# Patient Record
Sex: FEMALE | Race: WHITE | Employment: UNEMPLOYED | URBAN - METROPOLITAN AREA
[De-identification: names, ages, dates, MRNs, and addresses within clinical notes are randomized per-mention and may not be internally consistent; named-entity substitution may affect disease eponyms.]

---

## 2017-06-16 ENCOUNTER — LAB REQUISITION (OUTPATIENT)
Dept: LAB | Facility: HOSPITAL | Age: 22
End: 2017-06-16
Payer: COMMERCIAL

## 2017-06-16 DIAGNOSIS — N92.6 IRREGULAR MENSTRUATION: ICD-10-CM

## 2017-06-16 PROCEDURE — 84702 CHORIONIC GONADOTROPIN TEST: CPT | Performed by: OBSTETRICS & GYNECOLOGY

## 2017-07-22 ENCOUNTER — HOSPITAL ENCOUNTER (OUTPATIENT)
Age: 22
Discharge: HOME | End: 2017-07-22
Attending: FAMILY MEDICINE
Payer: COMMERCIAL

## 2017-07-22 VITALS
RESPIRATION RATE: 20 BRPM | HEART RATE: 95 BPM | SYSTOLIC BLOOD PRESSURE: 113 MMHG | TEMPERATURE: 98 F | OXYGEN SATURATION: 99 % | DIASTOLIC BLOOD PRESSURE: 67 MMHG

## 2017-07-22 DIAGNOSIS — H10.12 ALLERGIC CONJUNCTIVITIS, LEFT: ICD-10-CM

## 2017-07-22 DIAGNOSIS — N39.0 UTI (URINARY TRACT INFECTION), UNCOMPLICATED: Primary | ICD-10-CM

## 2017-07-22 LAB
POCT BILIRUBIN URINE: NEGATIVE
POCT GLUCOSE URINE: NEGATIVE MG/DL
POCT KETONE URINE: 15 MG/DL
POCT NITRITE URINE: NEGATIVE
POCT PH URINE: 6.5 (ref 5–8)
POCT PROTEIN URINE: 30 MG/DL
POCT SPECIFIC GRAVITY URINE: 1.02
POCT URINE COLOR: YELLOW
POCT URINE PREGNANCY: NEGATIVE
POCT UROBILINOGEN URINE: 0.2 MG/DL

## 2017-07-22 PROCEDURE — 87086 URINE CULTURE/COLONY COUNT: CPT | Performed by: FAMILY MEDICINE

## 2017-07-22 PROCEDURE — 81002 URINALYSIS NONAUTO W/O SCOPE: CPT | Performed by: FAMILY MEDICINE

## 2017-07-22 PROCEDURE — 99214 OFFICE O/P EST MOD 30 MIN: CPT

## 2017-07-22 PROCEDURE — 81025 URINE PREGNANCY TEST: CPT | Performed by: FAMILY MEDICINE

## 2017-07-22 RX ORDER — NITROFURANTOIN 25; 75 MG/1; MG/1
100 CAPSULE ORAL 2 TIMES DAILY
Qty: 10 CAPSULE | Refills: 0 | Status: SHIPPED | OUTPATIENT
Start: 2017-07-22 | End: 2017-07-27

## 2017-07-22 RX ORDER — OLOPATADINE HYDROCHLORIDE 2 MG/ML
SOLUTION/ DROPS OPHTHALMIC
Qty: 1 BOTTLE | Refills: 0 | Status: SHIPPED | OUTPATIENT
Start: 2017-07-22 | End: 2017-08-29

## 2017-07-22 NOTE — ED PROVIDER NOTES
Patient Seen in: [facility]    History   Patient presents with:  Conjunctivitis  Urinary Symptoms (urologic)    Stated Complaint: urinary issue 1 wk    HPI    This 53-year-old female presents the office with urinary frequency fo today and agreed except as otherwise stated in HPI.     Physical Exam   ED Triage Vitals [07/22/17 1015]  BP: 113/67  Pulse: 95  Resp: 20  Temp: 98.4 °F (36.9 °C)  Temp src: Temporal  SpO2: 99 %  O2 Device: None (Room air)    Current:/67   Pulse 95 allergic conjunctivitis. I do not see evidence for infectious pinkeye. Prescription for Pataday eyedrops are given. She is to restart her Claritin 10 mg once daily for allergy symptoms.   She is to follow-up with her primary doctor in 2-3 days if not imp

## 2017-07-31 ENCOUNTER — HOSPITAL ENCOUNTER (OUTPATIENT)
Age: 22
Discharge: HOME | End: 2017-07-31
Attending: FAMILY MEDICINE
Payer: COMMERCIAL

## 2017-07-31 VITALS
SYSTOLIC BLOOD PRESSURE: 130 MMHG | RESPIRATION RATE: 18 BRPM | HEIGHT: 61 IN | TEMPERATURE: 99 F | DIASTOLIC BLOOD PRESSURE: 68 MMHG | WEIGHT: 125 LBS | BODY MASS INDEX: 23.6 KG/M2 | OXYGEN SATURATION: 99 % | HEART RATE: 100 BPM

## 2017-07-31 DIAGNOSIS — N30.01 ACUTE CYSTITIS WITH HEMATURIA: Primary | ICD-10-CM

## 2017-07-31 LAB
POCT BILIRUBIN URINE: NEGATIVE
POCT GLUCOSE URINE: 250 MG/DL
POCT KETONE URINE: NEGATIVE MG/DL
POCT LEUKOCYTE ESTERASE URINE: NEGATIVE
POCT NITRITE URINE: NEGATIVE
POCT PH URINE: 7 (ref 5–8)
POCT SPECIFIC GRAVITY URINE: 1.02
POCT URINE PREGNANCY: NEGATIVE
POCT UROBILINOGEN URINE: 0.2 MG/DL

## 2017-07-31 PROCEDURE — 99214 OFFICE O/P EST MOD 30 MIN: CPT

## 2017-07-31 PROCEDURE — 99213 OFFICE O/P EST LOW 20 MIN: CPT

## 2017-07-31 PROCEDURE — 81025 URINE PREGNANCY TEST: CPT | Performed by: FAMILY MEDICINE

## 2017-07-31 PROCEDURE — 87086 URINE CULTURE/COLONY COUNT: CPT | Performed by: FAMILY MEDICINE

## 2017-07-31 PROCEDURE — 81002 URINALYSIS NONAUTO W/O SCOPE: CPT | Performed by: FAMILY MEDICINE

## 2017-07-31 RX ORDER — NITROFURANTOIN 25; 75 MG/1; MG/1
100 CAPSULE ORAL 2 TIMES DAILY
Qty: 14 CAPSULE | Refills: 0 | Status: SHIPPED | OUTPATIENT
Start: 2017-07-31 | End: 2017-08-07

## 2017-08-01 NOTE — ED INITIAL ASSESSMENT (HPI)
Pain with urination- x 10 days  Pain at the end of urine, with frequency, denies fever or blood in urine. Pt was diagnosed with UTI  10 days ago  Ws prescribed with antibiotic x 5 days. Completed Wednesday.

## 2017-08-01 NOTE — ED PROVIDER NOTES
Patient Seen in: [facility]    History   Patient presents with:  Urinary Symptoms (urologic)    Stated Complaint: frequency,burning urinating x10 days    HPI  22-year-old female coming in with complaints of painful urination ass air)    Current:/68   Pulse 100   Temp 98.6 °F (37 °C) (Temporal)   Resp 18   Ht 154.9 cm (5' 1\")   Wt 56.7 kg   LMP 07/14/2017   SpO2 99%   BMI 23.62 kg/m²         Physical Exam    GEN: Not in any acute distress, making good conversation, answering in antibiotics. Otherwise, take all your medications until completed. See your doctor in 2-3 days if not better.   If symptoms of pyelonephritis occur - nausea, vomiting, flank pain, fever and chills please return immediately / go to the ER    Avoid holdi

## 2017-08-29 ENCOUNTER — OFFICE VISIT (OUTPATIENT)
Dept: OBGYN CLINIC | Facility: CLINIC | Age: 22
End: 2017-08-29

## 2017-08-29 VITALS
HEART RATE: 81 BPM | DIASTOLIC BLOOD PRESSURE: 72 MMHG | WEIGHT: 118 LBS | SYSTOLIC BLOOD PRESSURE: 116 MMHG | BODY MASS INDEX: 22 KG/M2

## 2017-08-29 DIAGNOSIS — Z30.011 BCP (BIRTH CONTROL PILLS) INITIATION: ICD-10-CM

## 2017-08-29 DIAGNOSIS — R39.89 SENSATION OF PRESSURE IN BLADDER AREA: ICD-10-CM

## 2017-08-29 DIAGNOSIS — N89.8 VAGINAL DISCHARGE: Primary | ICD-10-CM

## 2017-08-29 DIAGNOSIS — Z11.3 ROUTINE SCREENING FOR STI (SEXUALLY TRANSMITTED INFECTION): ICD-10-CM

## 2017-08-29 LAB
MULTISTIX LOT#: NORMAL NUMERIC
PH, URINE: 7 (ref 4.5–8)
PROTEIN (URINE DIPSTICK): 3 MG/DL
SPECIFIC GRAVITY: 1.01 (ref 1–1.03)
UROBILINOGEN,SEMI-QN: 0.2 MG/DL (ref 0–1.9)

## 2017-08-29 PROCEDURE — 99385 PREV VISIT NEW AGE 18-39: CPT | Performed by: ADVANCED PRACTICE MIDWIFE

## 2017-08-29 PROCEDURE — 81002 URINALYSIS NONAUTO W/O SCOPE: CPT | Performed by: ADVANCED PRACTICE MIDWIFE

## 2017-08-29 NOTE — PROGRESS NOTES
HPI:    Patient ID: Missy Rodriguez is a 25year old female. Was treated twice for UTI. Culture showed 10,000 mixed bacteria. Has pressure after completing urination. No burning. Denies vaginal discharge. Is sexually active.   Uses condoms for prot She exhibits no distension and no mass. There is no tenderness. There is no rebound and no guarding. Genitourinary: Rectum normal, vagina normal and uterus normal. There is no rash, tenderness or lesion on the right labia.  There is no rash, tenderness or once a week.            Imaging & Referrals:  None       VV#[id number]

## 2017-08-30 DIAGNOSIS — N76.0 BV (BACTERIAL VAGINOSIS): Primary | ICD-10-CM

## 2017-08-30 DIAGNOSIS — B96.89 BV (BACTERIAL VAGINOSIS): Primary | ICD-10-CM

## 2017-08-30 RX ORDER — METRONIDAZOLE 500 MG/1
500 TABLET ORAL 2 TIMES DAILY
Qty: 14 TABLET | Refills: 0 | Status: SHIPPED | OUTPATIENT
Start: 2017-08-30 | End: 2019-11-05

## 2017-08-31 ENCOUNTER — TELEPHONE (OUTPATIENT)
Dept: OBGYN CLINIC | Facility: CLINIC | Age: 22
End: 2017-08-31

## 2017-08-31 DIAGNOSIS — Z11.3 SCREENING EXAMINATION FOR STD (SEXUALLY TRANSMITTED DISEASE): Primary | ICD-10-CM

## 2017-08-31 LAB
C TRACH DNA SPEC QL NAA+PROBE: POSITIVE
GENITAL VAGINOSIS SCREEN: POSITIVE
N GONORRHOEA DNA SPEC QL NAA+PROBE: NEGATIVE
TRICHOMONAS SCREEN: NEGATIVE

## 2017-08-31 RX ORDER — AZITHROMYCIN 500 MG/1
TABLET, FILM COATED ORAL
Qty: 2 TABLET | Refills: 0 | Status: SHIPPED | OUTPATIENT
Start: 2017-08-31 | End: 2019-11-05

## 2017-08-31 NOTE — TELEPHONE ENCOUNTER
Please inform patient of positive chlamydia test.  Recommend Azithromycin, 1000mg PO once. Also recommend partner be treated. Prescription sent to pharmacy. To have test of cure culture done in approx 4 weeks.

## 2017-08-31 NOTE — TELEPHONE ENCOUNTER
08/31/2017 Dayna Rivera from the lab called for a positive chlamydia result for pt. Pls advise. Routed to Kristi MAY as well as caridad.

## 2017-09-08 ENCOUNTER — TELEPHONE (OUTPATIENT)
Dept: OBGYN CLINIC | Facility: CLINIC | Age: 22
End: 2017-09-08

## 2017-09-20 ENCOUNTER — TELEPHONE (OUTPATIENT)
Dept: PEDIATRICS CLINIC | Facility: CLINIC | Age: 22
End: 2017-09-20

## 2017-09-20 NOTE — TELEPHONE ENCOUNTER
Pt states she was prescribed medication and it is still not helping. Pt is not in town pls adv     Current Outpatient Prescriptions:  azithromycin 500 MG Oral Tab Take two tablets by mouth at once.  Disp: 2 tablet Rfl: 0   metRONIDAZOLE (FLAGYL) 500 MG Oral

## 2017-09-20 NOTE — TELEPHONE ENCOUNTER
Patient seen 8/29 with [facility] positive for chlamydia was tx at that time with azitrthromycin but still having burning symptoms. Just moved to [facility] can not return to be seen anytime soon.  Per SJM patient needs to be retested again to make sure clear of in

## 2017-10-11 ENCOUNTER — TELEPHONE (OUTPATIENT)
Dept: OBGYN CLINIC | Facility: CLINIC | Age: 22
End: 2017-10-11

## 2017-10-11 NOTE — TELEPHONE ENCOUNTER
Corbin Trotter from [address] [place] [address] calling to see what treatment was given to patient for chlamydia.

## 2017-10-11 NOTE — TELEPHONE ENCOUNTER
Felipe Bullock from [facility] department informed pt was treated with 1g of azithromycin. Verbalized understanding. No further question.

## 2018-04-02 RX ORDER — AZITHROMYCIN 500 MG/1
TABLET, FILM COATED ORAL
Qty: 2 TABLET | Refills: 0 | OUTPATIENT
Start: 2018-04-02

## 2019-11-05 ENCOUNTER — HOSPITAL ENCOUNTER (OUTPATIENT)
Age: 24
Discharge: HOME | End: 2019-11-05
Payer: COMMERCIAL

## 2019-11-05 VITALS
SYSTOLIC BLOOD PRESSURE: 124 MMHG | RESPIRATION RATE: 18 BRPM | DIASTOLIC BLOOD PRESSURE: 69 MMHG | HEART RATE: 95 BPM | BODY MASS INDEX: 23.79 KG/M2 | TEMPERATURE: 99 F | HEIGHT: 61 IN | WEIGHT: 126 LBS | OXYGEN SATURATION: 99 %

## 2019-11-05 DIAGNOSIS — J01.00 ACUTE NON-RECURRENT MAXILLARY SINUSITIS: Primary | ICD-10-CM

## 2019-11-05 PROCEDURE — 81025 URINE PREGNANCY TEST: CPT | Performed by: NURSE PRACTITIONER

## 2019-11-05 PROCEDURE — 99213 OFFICE O/P EST LOW 20 MIN: CPT

## 2019-11-05 PROCEDURE — 99214 OFFICE O/P EST MOD 30 MIN: CPT

## 2019-11-05 RX ORDER — DOXYCYCLINE HYCLATE 100 MG/1
100 CAPSULE ORAL 2 TIMES DAILY
Qty: 20 CAPSULE | Refills: 0 | Status: SHIPPED | OUTPATIENT
Start: 2019-11-05 | End: 2019-11-15

## 2019-11-06 NOTE — ED PROVIDER NOTES
Patient Seen in: [facility]      History   Patient presents with:  Cough/URI    Stated Complaint: throat pain itching sinus press sinus congestion x 2 weeks    HPI  27year old female presents with nasal congestion, sore throat, present. Right Sinus: Maxillary sinus tenderness present. Left Sinus: Maxillary sinus tenderness present. Eyes:      General:         Right eye: No discharge. Left eye: No discharge.       Conjunctiva/sclera: Conjunctivae normal.      Pu capsule (100 mg total) by mouth 2 (two) times daily for 10 days. , Normal, Disp-20 capsule, R-0

## 2019-11-06 NOTE — ED INITIAL ASSESSMENT (HPI)
Pt here w/ sinus congestion and sore throat for 2 weeks. +cough, dry, hacking cough.  Post nasal drip

## 2019-11-08 ENCOUNTER — HOSPITAL ENCOUNTER (OUTPATIENT)
Age: 24
Discharge: HOME | End: 2019-11-08
Attending: EMERGENCY MEDICINE
Payer: COMMERCIAL

## 2019-11-08 VITALS
SYSTOLIC BLOOD PRESSURE: 105 MMHG | RESPIRATION RATE: 16 BRPM | HEART RATE: 89 BPM | OXYGEN SATURATION: 100 % | DIASTOLIC BLOOD PRESSURE: 65 MMHG | TEMPERATURE: 98 F

## 2019-11-08 DIAGNOSIS — R30.0 DYSURIA: Primary | ICD-10-CM

## 2019-11-08 PROCEDURE — 87086 URINE CULTURE/COLONY COUNT: CPT | Performed by: EMERGENCY MEDICINE

## 2019-11-08 PROCEDURE — 81025 URINE PREGNANCY TEST: CPT

## 2019-11-08 PROCEDURE — 99214 OFFICE O/P EST MOD 30 MIN: CPT

## 2019-11-08 PROCEDURE — 81002 URINALYSIS NONAUTO W/O SCOPE: CPT | Performed by: EMERGENCY MEDICINE

## 2019-11-08 PROCEDURE — 81025 URINE PREGNANCY TEST: CPT | Performed by: EMERGENCY MEDICINE

## 2019-11-08 PROCEDURE — 81002 URINALYSIS NONAUTO W/O SCOPE: CPT

## 2019-11-08 RX ORDER — PHENAZOPYRIDINE HYDROCHLORIDE 100 MG/1
100 TABLET, FILM COATED ORAL 3 TIMES DAILY PRN
Qty: 6 TABLET | Refills: 0 | Status: SHIPPED | OUTPATIENT
Start: 2019-11-08 | End: 2019-11-11

## 2019-11-08 RX ORDER — SULFAMETHOXAZOLE AND TRIMETHOPRIM 800; 160 MG/1; MG/1
1 TABLET ORAL 2 TIMES DAILY
Qty: 6 TABLET | Refills: 0 | Status: SHIPPED | OUTPATIENT
Start: 2019-11-08 | End: 2019-11-11

## 2019-11-09 NOTE — ED PROVIDER NOTES
Patient Seen in: [facility]      History   Patient presents with:  Urinary Symptoms (urologic)    Stated Complaint: DYSURIA/URINARY FREQUENCY     HPI    Patient is a 26-year-old female presents to urgent care for evaluation of u Blood, Urine Moderate (*)     All other components within normal limits   POCT PREGNANCY, URINE - Normal   URINE CULTURE, ROUTINE                  MDM     Patient is a 32-year-old female comes to ED for evaluation of dysuria.   Patient urinalysis appears ne

## 2019-11-10 NOTE — ED NOTES
Patient called and states pyridium not helping with dysuria. Told final urine no growth and to f/u with her pcp,

## 2020-11-01 ENCOUNTER — WALK IN (OUTPATIENT)
Dept: URGENT CARE | Age: 25
End: 2020-11-01
Attending: EMERGENCY MEDICINE

## 2020-11-01 DIAGNOSIS — Z20.822 SUSPECTED COVID-19 VIRUS INFECTION: Primary | ICD-10-CM

## 2020-11-01 PROCEDURE — 99202 OFFICE O/P NEW SF 15 MIN: CPT

## 2020-11-01 PROCEDURE — C9803 HOPD COVID-19 SPEC COLLECT: HCPCS

## 2020-11-01 PROCEDURE — U0003 INFECTIOUS AGENT DETECTION BY NUCLEIC ACID (DNA OR RNA); SEVERE ACUTE RESPIRATORY SYNDROME CORONAVIRUS 2 (SARS-COV-2) (CORONAVIRUS DISEASE [COVID-19]), AMPLIFIED PROBE TECHNIQUE, MAKING USE OF HIGH THROUGHPUT TECHNOLOGIES AS DESCRIBED BY CMS-2020-01-R: HCPCS

## 2020-11-01 ASSESSMENT — ENCOUNTER SYMPTOMS
BRUISES/BLEEDS EASILY: 0
NAUSEA: 0
ABDOMINAL PAIN: 0
EYE DISCHARGE: 0
DIZZINESS: 0
POLYPHAGIA: 0
FEVER: 0
CONFUSION: 0
EYE PAIN: 0
CHILLS: 0
NUMBNESS: 0
POLYDIPSIA: 0
COLOR CHANGE: 0
HEADACHES: 0
SHORTNESS OF BREATH: 0
DIARRHEA: 0
ACTIVITY CHANGE: 0
BACK PAIN: 0
SORE THROAT: 1
WOUND: 0
COUGH: 1
WHEEZING: 0
VOMITING: 0
EYE REDNESS: 0
FACIAL SWELLING: 0

## 2020-11-01 ASSESSMENT — PAIN SCALES - GENERAL: PAINLEVEL: 1-2

## 2020-11-04 ENCOUNTER — TELEPHONE (OUTPATIENT)
Dept: SCHEDULING | Age: 25
End: 2020-11-04

## 2020-11-04 LAB
SARS-COV-2 RNA RESP QL NAA+PROBE: NOT DETECTED
SPECIMEN SOURCE: NORMAL

## 2020-11-06 ENCOUNTER — TELEPHONE (OUTPATIENT)
Dept: SCHEDULING | Age: 25
End: 2020-11-06

## 2020-11-07 ENCOUNTER — TELEPHONE (OUTPATIENT)
Dept: SCHEDULING | Age: 25
End: 2020-11-07

## 2021-05-17 ENCOUNTER — OFFICE VISIT (OUTPATIENT)
Dept: OBGYN CLINIC | Facility: CLINIC | Age: 26
End: 2021-05-17
Payer: MEDICAID

## 2021-05-17 VITALS
HEIGHT: 61 IN | SYSTOLIC BLOOD PRESSURE: 100 MMHG | WEIGHT: 135 LBS | BODY MASS INDEX: 25.49 KG/M2 | DIASTOLIC BLOOD PRESSURE: 60 MMHG

## 2021-05-17 DIAGNOSIS — N92.6 MISSED MENSES: Primary | ICD-10-CM

## 2021-05-17 PROCEDURE — 99203 OFFICE O/P NEW LOW 30 MIN: CPT | Performed by: ADVANCED PRACTICE MIDWIFE

## 2021-05-17 PROCEDURE — 3008F BODY MASS INDEX DOCD: CPT | Performed by: ADVANCED PRACTICE MIDWIFE

## 2021-05-17 PROCEDURE — 3074F SYST BP LT 130 MM HG: CPT | Performed by: ADVANCED PRACTICE MIDWIFE

## 2021-05-17 PROCEDURE — 81025 URINE PREGNANCY TEST: CPT | Performed by: ADVANCED PRACTICE MIDWIFE

## 2021-05-17 PROCEDURE — 3078F DIAST BP <80 MM HG: CPT | Performed by: ADVANCED PRACTICE MIDWIFE

## 2021-05-17 RX ORDER — PRENATAL VIT/IRON FUM/FOLIC AC 27MG-0.8MG
1 TABLET ORAL DAILY
COMMUNITY

## 2021-05-17 NOTE — PROGRESS NOTES
HPI/Subjective:   Patient ID: Dianne Lauren is a 23year old female. LMP 3/27/2021 ALONSO 1/1/2022 7 w 2 d. Unplanned pregnancy. Has supportive mother and FOB. Has had some nausea, no bleeding.   Has not treated nausea with anything but it suddenly st of Onset   • Other (Other[other]) Father         overdose   • Hypertension Mother    • Other (Other[other]) Brother         asthma      Social History:   Social History    Tobacco Use      Smoking status: Never Smoker      Smokeless tobacco: Never Used

## 2021-05-19 ENCOUNTER — TELEPHONE (OUTPATIENT)
Dept: OBGYN CLINIC | Facility: CLINIC | Age: 26
End: 2021-05-19

## 2021-05-19 NOTE — TELEPHONE ENCOUNTER
Offered pt meet and greet appt and pt accepted. Appt scheduled tomorrow with BRIE. Pt agreed and voiced understanding.

## 2021-05-19 NOTE — TELEPHONE ENCOUNTER
Primary Diagnosis Code: N92.6 Description: Irregular menstruation, unspecified  Secondary Diagnosis Code:  Description:   Date of Service: Not provided    CPT Code: 34753 Description: OB US < 14 WKS SINGLE FETUS  Case Number: [id number]  Review Date: 5/19/

## 2021-05-20 ENCOUNTER — HOSPITAL ENCOUNTER (OUTPATIENT)
Dept: ULTRASOUND IMAGING | Facility: HOSPITAL | Age: 26
Discharge: HOME | End: 2021-05-20
Attending: ADVANCED PRACTICE MIDWIFE
Payer: MEDICAID

## 2021-05-20 ENCOUNTER — OFFICE VISIT (OUTPATIENT)
Dept: OBGYN CLINIC | Facility: CLINIC | Age: 26
End: 2021-05-20
Payer: MEDICAID

## 2021-05-20 DIAGNOSIS — N92.6 MISSED MENSES: ICD-10-CM

## 2021-05-20 DIAGNOSIS — Z34.91 PREGNANT AND NOT YET DELIVERED IN FIRST TRIMESTER: Primary | ICD-10-CM

## 2021-05-20 PROCEDURE — 76801 OB US < 14 WKS SINGLE FETUS: CPT | Performed by: ADVANCED PRACTICE MIDWIFE

## 2021-05-20 NOTE — TELEPHONE ENCOUNTER
Request ID: [id number]  Physician: DR. Sara Du  Facility/Provider: [facility]  Treatment Setting: Outpatient  Service Procedure Code/Description:  Procedure Description Units  Requested  Units  Approved  [id number] Ultrasound, a special kin

## 2021-05-21 NOTE — PROGRESS NOTES
Melvin Webster presents for meet and greet with mother participating virtually. She is 7.5wks by LMP. Pt. denies any medical conditions. Desires CNM care. Midwifery care & philosophy discussed. Practice guidelines discussed.   Patient had missed menses visit on

## 2021-05-24 ENCOUNTER — LAB ENCOUNTER (OUTPATIENT)
Dept: LAB | Facility: HOSPITAL | Age: 26
End: 2021-05-24
Attending: ADVANCED PRACTICE MIDWIFE
Payer: MEDICAID

## 2021-05-24 ENCOUNTER — NURSE ONLY (OUTPATIENT)
Dept: OBGYN CLINIC | Facility: CLINIC | Age: 26
End: 2021-05-24
Payer: MEDICAID

## 2021-05-24 ENCOUNTER — TELEPHONE (OUTPATIENT)
Dept: OBGYN CLINIC | Facility: CLINIC | Age: 26
End: 2021-05-24

## 2021-05-24 VITALS — WEIGHT: 132.25 LBS | BODY MASS INDEX: 24.34 KG/M2 | HEIGHT: 62 IN

## 2021-05-24 DIAGNOSIS — Z34.01 ENCOUNTER FOR SUPERVISION OF NORMAL FIRST PREGNANCY IN FIRST TRIMESTER: Primary | ICD-10-CM

## 2021-05-24 DIAGNOSIS — Z34.01 ENCOUNTER FOR SUPERVISION OF NORMAL FIRST PREGNANCY IN FIRST TRIMESTER: ICD-10-CM

## 2021-05-24 PROCEDURE — 86762 RUBELLA ANTIBODY: CPT

## 2021-05-24 PROCEDURE — 83021 HEMOGLOBIN CHROMOTOGRAPHY: CPT

## 2021-05-24 PROCEDURE — 0502F SUBSEQUENT PRENATAL CARE: CPT | Performed by: ADVANCED PRACTICE MIDWIFE

## 2021-05-24 PROCEDURE — 85025 COMPLETE CBC W/AUTO DIFF WBC: CPT

## 2021-05-24 PROCEDURE — 86901 BLOOD TYPING SEROLOGIC RH(D): CPT

## 2021-05-24 PROCEDURE — 83020 HEMOGLOBIN ELECTROPHORESIS: CPT

## 2021-05-24 PROCEDURE — 3008F BODY MASS INDEX DOCD: CPT | Performed by: ADVANCED PRACTICE MIDWIFE

## 2021-05-24 PROCEDURE — 87086 URINE CULTURE/COLONY COUNT: CPT

## 2021-05-24 PROCEDURE — 87389 HIV-1 AG W/HIV-1&-2 AB AG IA: CPT

## 2021-05-24 PROCEDURE — 86850 RBC ANTIBODY SCREEN: CPT

## 2021-05-24 PROCEDURE — 87340 HEPATITIS B SURFACE AG IA: CPT

## 2021-05-24 PROCEDURE — 86780 TREPONEMA PALLIDUM: CPT

## 2021-05-24 PROCEDURE — 36415 COLL VENOUS BLD VENIPUNCTURE: CPT

## 2021-05-24 PROCEDURE — 86900 BLOOD TYPING SEROLOGIC ABO: CPT

## 2021-05-24 PROCEDURE — 86803 HEPATITIS C AB TEST: CPT

## 2021-05-24 NOTE — TELEPHONE ENCOUNTER
----- Message from Aura Holden CNM sent at 5/24/2021  4:22 PM CDT -----  Can you please call this patient referred by [facility].   She needs an RN ed visit   Early single viable appearing intrauterine gestation with age of 7 weeks 3 days +/-5 days and EDC o

## 2021-05-24 NOTE — PROGRESS NOTES
NOB education complete and NOB Packet given to pt. NOB Labs ordered. Pt declined FTS but she desires Innatal and Preparent with [facility]. Pt is aware they will call pt to schedule appt. Pt states she has an abnormal pap at age 26.   Repeat pap was Norm

## 2021-05-24 NOTE — TELEPHONE ENCOUNTER
Pt advised of ultrasound results per MES. Pt already completed nurse ed visit. Pt agreed and voiced understanding.

## 2021-05-25 VITALS
DIASTOLIC BLOOD PRESSURE: 70 MMHG | TEMPERATURE: 98.1 F | OXYGEN SATURATION: 100 % | RESPIRATION RATE: 16 BRPM | HEART RATE: 84 BPM | WEIGHT: 131 LBS | SYSTOLIC BLOOD PRESSURE: 102 MMHG

## 2021-05-27 ENCOUNTER — TELEPHONE (OUTPATIENT)
Dept: OBGYN CLINIC | Facility: CLINIC | Age: 26
End: 2021-05-27

## 2021-05-27 NOTE — TELEPHONE ENCOUNTER
Pt advised of normal NOB labs and HIV negative per Justino Garber. Pt agreed and voiced understanding.

## 2021-05-27 NOTE — TELEPHONE ENCOUNTER
----- Message from Edda Lawrence CNM sent at 5/27/2021 10:30 AM CDT -----  Please call patient. NOB labs normal. HIV negative. Thanks!

## 2021-06-02 ENCOUNTER — INITIAL PRENATAL (OUTPATIENT)
Dept: OBGYN CLINIC | Facility: CLINIC | Age: 26
End: 2021-06-02
Payer: MEDICAID

## 2021-06-02 ENCOUNTER — TELEPHONE (OUTPATIENT)
Dept: OBGYN CLINIC | Facility: CLINIC | Age: 26
End: 2021-06-02

## 2021-06-02 VITALS
WEIGHT: 128.63 LBS | BODY MASS INDEX: 24 KG/M2 | SYSTOLIC BLOOD PRESSURE: 108 MMHG | DIASTOLIC BLOOD PRESSURE: 76 MMHG | HEART RATE: 118 BPM

## 2021-06-02 DIAGNOSIS — Z34.01 ENCOUNTER FOR SUPERVISION OF NORMAL FIRST PREGNANCY IN FIRST TRIMESTER: Primary | ICD-10-CM

## 2021-06-02 DIAGNOSIS — O21.9 NAUSEA/VOMITING IN PREGNANCY: ICD-10-CM

## 2021-06-02 PROCEDURE — 3078F DIAST BP <80 MM HG: CPT | Performed by: ADVANCED PRACTICE MIDWIFE

## 2021-06-02 PROCEDURE — 81002 URINALYSIS NONAUTO W/O SCOPE: CPT | Performed by: ADVANCED PRACTICE MIDWIFE

## 2021-06-02 PROCEDURE — 3074F SYST BP LT 130 MM HG: CPT | Performed by: ADVANCED PRACTICE MIDWIFE

## 2021-06-02 PROCEDURE — 0502F SUBSEQUENT PRENATAL CARE: CPT | Performed by: ADVANCED PRACTICE MIDWIFE

## 2021-06-02 RX ORDER — FAMOTIDINE 10 MG
10 TABLET ORAL 2 TIMES DAILY
Qty: 60 TABLET | Refills: 1 | Status: SHIPPED | OUTPATIENT
Start: 2021-06-02 | End: 2021-07-30

## 2021-06-02 RX ORDER — PYRIDOXINE HCL (VITAMIN B6) 50 MG
25 TABLET ORAL 3 TIMES DAILY
Qty: 90 TABLET | Refills: 1 | Status: SHIPPED | OUTPATIENT
Start: 2021-06-02 | End: 2021-07-30

## 2021-06-02 NOTE — TELEPHONE ENCOUNTER
Pt states she has been unable to keep anything down for a week due to nausea & vomiting & has been losing weight. Not on any medications. Has been trying crackers, candy & tea w/o relief. appt offered & scheduled today.  Pt verbalized an understanding & agr

## 2021-06-02 NOTE — PATIENT INSTRUCTIONS
Healthy Eating Habits During Pregnancy    It’s important to develop healthy eating habits while you are pregnant, for you as well as for your baby. Here are some ways to stay healthy.   Aim for a healthy weight  A slow, steady rate of weight gain is often tilefish, and albacore tuna  Things to limit  Ask your healthcare provider whether it’s safe to eat or drink:  · Caffeine  · Artificial sweeteners  · Organ meats  · Certain types of fish  · Fish and shellfish that contain mercury in lower amounts, like shr beans  1 tablespoon peanut butter  1/2 ounce nuts Fluids  8 or more 8-ounce glasses  Examples:  Water  Diluted juices: Apple, orange, cranberry  Mineral water  Clear soups, broth     *Note: Choose whole grains whenever possible.   ** Note: Try to choose low Planning Your Exercise Routine    While you’re pregnant, an exercise routine helps both your mind and your body feel good. It tones your muscles and makes them stronger. It also gives you and your baby more oxygen.   The right exercise for you  Overall cond Park the car farther from a store and walk. · If you can, do errands on foot instead of driving. · Walk across the office to talk to someone in person instead of calling. · While waiting for appointments, go up and down stairs or around the block.    Tip a time and place to exercise each day. · Wear loose-fitting clothes and comfortable athletic shoes. · Stretch before and after you exercise.  (Be sure to stretch slowly and to hold stretches for 30 to 40 seconds.)  Be active  Unless your healthcare provid Call your healthcare provider right away if you have:  · Shortness of breath before starting exercise  · Vaginal bleeding  · Dizziness or feeling faint  · Chest pain  · Headache  · Decreased fetal movement  ·  contractions   · Muscle weakness  · Dalton get enough calcium, protein, and carbohydrates. · Don’t skip meals. · Eat healthy snacks. · Pick nutrient-dense, high-calorie healthy food like trail mix or protein shakes. · See a dietitian for help.   · Talk to your healthcare provider if you have had dentist or healthcare provider if you have concerns. Keeping a healthy mouth  · Brush twice daily with fluoride toothpaste. Floss at least once a day.   · If you have morning sickness, rinse your mouth with a teaspoon of baking soda mixed with water after baby has formed all of its major body organs and weighs just over an ounce. Actual size of baby is 1/4\"    Month 1 (weeks 1 to 4)  The placenta (the organ that nourishes your baby) begins to form.  The brain, spinal cord, heart, gastrointestinal tract, talk with your healthcare provider:  · Smoking increases the risk of stillbirth or having a low-birth-weight baby. If you smoke, quit now. · Alcohol and drugs have been linked with miscarriage, birth defects, intellectual disability, and low birth weight. nausea and fatigue. In the second trimester, sex may be very enjoyable. The third trimester can be a challenge comfort-wise. Try different positions and see what’s best for you both.   Alex last reviewed this educational content on 10/1/2017  © 2000-202 [employer] soles. Third trimester tips  Reducing heartburn  · Eat small, light meals throughout the day rather than 3 large ones. · Sleep with your upper body raised 6 inches. Don’t lie down until 2 hours after you eat.   · Don't eat greasy, fried, or spicy Here are some other causes:  · Implantation of the embryo on the uterine wall  · Subchorionic hemorrhage (bleeding between the sac membrane and the uterus)  · Miscarriage  · Ectopic (tubal) pregnancy  If you notice spotting  Spotting (very light bleeding) · Is the blood bright red or brownish? · Have you had sexual intercourse recently? · Have you had pain or cramping? · Have you felt dizzy or faint? Monitoring your pregnancy  Bleeding will often stop as quickly as it began.  Your pregnancy may go on a n bleeding or your healthcare provider tells you to stop. Even minor falls won’t cause a miscarriage. Miscarriages happen because things were not developing as they were supposed to. No medicine can prevent a miscarriage.    Ectopic pregnancy  In a normal pre bleeding.  If this happens, you may have:   · Sudden severe pain in your lower abdomen  · Vaginal bleeding  · Weakness, dizziness, and sometimes fainting  If any of these symptoms occur:  · Call 911or return right away to the hospital.  · Don't drive yourse medical care. Always follow your healthcare professional's instructions. Pregnancy: Body Changes  From conception (fertilization) until after the birth of your child, you and your baby will change every day.  To help you understand what is happening, camila Alex last reviewed this educational content on 1/1/2018  © 9106-1095 [employer] [address]. [address] [facility], [facility] [place]. All rights reserved. This information is not intended as a substitute for professional medical care.  Always follo workout on how you feel, not your heart rate. Heart rates aren’t a good way to measure effort during pregnancy. Can I lift and carry safely? Yes, if your healthcare provider doesn’t tell you otherwise.  Learn to lift and carry safely to avoid injury and r enough of the following:  · Protein. Eat eggs and milk if you’re an ovo-lacto vegetarian. Eat vegetable proteins, like tofu and beans, if you’re a vegan. · Calcium.  If you don’t eat dairy, try soy milk, soy cheese fortified with calcium, and orange juice information is not intended as a substitute for professional medical care. Always follow your healthcare professional's instructions.

## 2021-06-02 NOTE — PROGRESS NOTES
Khanhkris Matalakesha is here for her NOB visit. Actually, she paged the nurse to report that, for the last 5 days, she has been unable to keep any food down. Is taking sips of water successfully. No problems with urination, vaginal bleeding, or cramping.     Vital signs

## 2021-06-17 ENCOUNTER — TELEPHONE (OUTPATIENT)
Dept: OBGYN CLINIC | Facility: CLINIC | Age: 26
End: 2021-06-17

## 2021-06-17 NOTE — TELEPHONE ENCOUNTER
Spoke with pt and advised results of [facility] testing have not been received. Pt states she had lab drawn on 6/15. Pt advised it takes around 2 weeks to receive results. Pt agreed and voiced understanding.

## 2021-06-17 NOTE — TELEPHONE ENCOUNTER
Pt had gender testing thru [facility] and would like gender not to be put on profile.   Pt does not want to know the gender    Please call Mom with gender:  [phone]

## 2021-06-19 ENCOUNTER — TELEPHONE (OUTPATIENT)
Dept: OBGYN CLINIC | Facility: CLINIC | Age: 26
End: 2021-06-19

## 2021-06-19 ENCOUNTER — HOSPITAL ENCOUNTER (EMERGENCY)
Facility: HOSPITAL | Age: 26
Discharge: HOME | End: 2021-06-20
Payer: MEDICAID

## 2021-06-19 DIAGNOSIS — N39.0 URINARY TRACT INFECTION WITHOUT HEMATURIA, SITE UNSPECIFIED: ICD-10-CM

## 2021-06-19 DIAGNOSIS — O21.9 NAUSEA AND VOMITING IN PREGNANCY: Primary | ICD-10-CM

## 2021-06-19 PROCEDURE — 96361 HYDRATE IV INFUSION ADD-ON: CPT

## 2021-06-19 PROCEDURE — 99284 EMERGENCY DEPT VISIT MOD MDM: CPT

## 2021-06-19 PROCEDURE — 96374 THER/PROPH/DIAG INJ IV PUSH: CPT

## 2021-06-19 PROCEDURE — 96375 TX/PRO/DX INJ NEW DRUG ADDON: CPT

## 2021-06-19 PROCEDURE — 87086 URINE CULTURE/COLONY COUNT: CPT

## 2021-06-19 PROCEDURE — 81025 URINE PREGNANCY TEST: CPT

## 2021-06-19 PROCEDURE — 81001 URINALYSIS AUTO W/SCOPE: CPT

## 2021-06-19 PROCEDURE — 80048 BASIC METABOLIC PNL TOTAL CA: CPT

## 2021-06-19 PROCEDURE — 85025 COMPLETE CBC W/AUTO DIFF WBC: CPT

## 2021-06-19 PROCEDURE — 83735 ASSAY OF MAGNESIUM: CPT | Performed by: NURSE PRACTITIONER

## 2021-06-19 RX ORDER — ONDANSETRON 2 MG/ML
4 INJECTION INTRAMUSCULAR; INTRAVENOUS ONCE
Status: DISCONTINUED | OUTPATIENT
Start: 2021-06-19 | End: 2021-06-19

## 2021-06-19 RX ORDER — METOCLOPRAMIDE HYDROCHLORIDE 5 MG/ML
10 INJECTION INTRAMUSCULAR; INTRAVENOUS ONCE
Status: COMPLETED | OUTPATIENT
Start: 2021-06-19 | End: 2021-06-19

## 2021-06-19 RX ORDER — DIPHENHYDRAMINE HYDROCHLORIDE 50 MG/ML
25 INJECTION INTRAMUSCULAR; INTRAVENOUS ONCE
Status: COMPLETED | OUTPATIENT
Start: 2021-06-19 | End: 2021-06-19

## 2021-06-20 VITALS
DIASTOLIC BLOOD PRESSURE: 64 MMHG | WEIGHT: 119 LBS | BODY MASS INDEX: 22 KG/M2 | HEART RATE: 90 BPM | SYSTOLIC BLOOD PRESSURE: 97 MMHG | RESPIRATION RATE: 18 BRPM | OXYGEN SATURATION: 99 % | TEMPERATURE: 99 F

## 2021-06-20 RX ORDER — CEPHALEXIN 500 MG/1
500 CAPSULE ORAL EVERY 12 HOURS
Qty: 14 CAPSULE | Refills: 0 | Status: SHIPPED | OUTPATIENT
Start: 2021-06-20 | End: 2021-06-27

## 2021-06-20 NOTE — ED PROVIDER NOTES
Patient Seen in: [facility] Emergency Department      History   Patient presents with:  Nausea/Vomiting/Diarrhea    Stated Complaint: NV +12 weeks Preg called in by Midwife in [facility] L&D    HPI/Subjective:   HPI    32-year-old female presents the nya Current:/62   Pulse 92   Temp 99 °F (37.2 °C) (Oral)   Resp 17   Wt 54 kg   LMP 03/27/2021 (Exact Date)   SpO2 99%   BMI 21.77 kg/m²         Physical Exam  Vitals reviewed. Constitutional:       Appearance: Normal appearance.  She is well-deve limits   POCT PREGNANCY URINE - Abnormal; Notable for the following components:    POCT Urine Pregnancy Positive (*)     All other components within normal limits   BASIC METABOLIC PANEL (8) - Normal   MAGNESIUM - Normal   CBC WITH DIFFERENTIAL WITH PLATEL prescribed. Will not change the patient's antiemetic at this time as she is not taking the one that was provided for her by her mid wife as prescribed at this time. Patient has an appointment with her midwife on Monday that she was encouraged to keep.  Retu

## 2021-06-20 NOTE — TELEPHONE ENCOUNTER
Phone call from patient. She reports ongoing nausea and vomiting in pregnancy. She was seen in the office on 6/2/21 and was instructed to take B6 and unisom which seemed to have helped. This last week she symptoms returned.  She states she feels weak and is

## 2021-06-20 NOTE — ED INITIAL ASSESSMENT (HPI)
Patient is 12 weeks pregnant  Vomiting and nausea. Denies abdominal pain/cramping   Patient has had intermittent nausea/vomiting since 6 weeks pregnant.

## 2021-06-21 ENCOUNTER — TELEPHONE (OUTPATIENT)
Dept: OBGYN CLINIC | Facility: CLINIC | Age: 26
End: 2021-06-21

## 2021-06-21 ENCOUNTER — INITIAL PRENATAL (OUTPATIENT)
Dept: OBGYN CLINIC | Facility: CLINIC | Age: 26
End: 2021-06-21
Payer: MEDICAID

## 2021-06-21 VITALS
DIASTOLIC BLOOD PRESSURE: 80 MMHG | BODY MASS INDEX: 24 KG/M2 | SYSTOLIC BLOOD PRESSURE: 117 MMHG | WEIGHT: 128.81 LBS | HEART RATE: 139 BPM

## 2021-06-21 DIAGNOSIS — Z34.01 ENCOUNTER FOR SUPERVISION OF NORMAL FIRST PREGNANCY IN FIRST TRIMESTER: Primary | ICD-10-CM

## 2021-06-21 PROCEDURE — 0500F INITIAL PRENATAL CARE VISIT: CPT | Performed by: ADVANCED PRACTICE MIDWIFE

## 2021-06-21 PROCEDURE — 81002 URINALYSIS NONAUTO W/O SCOPE: CPT | Performed by: ADVANCED PRACTICE MIDWIFE

## 2021-06-21 PROCEDURE — 3079F DIAST BP 80-89 MM HG: CPT | Performed by: ADVANCED PRACTICE MIDWIFE

## 2021-06-21 PROCEDURE — 3074F SYST BP LT 130 MM HG: CPT | Performed by: ADVANCED PRACTICE MIDWIFE

## 2021-06-21 RX ORDER — ONDANSETRON 4 MG/1
4 TABLET, FILM COATED ORAL EVERY 8 HOURS PRN
Qty: 30 TABLET | Refills: 1 | Status: SHIPPED | OUTPATIENT
Start: 2021-06-21 | End: 2021-07-30

## 2021-06-21 NOTE — PROGRESS NOTES
Bennett Crowley is here with her mom. She went to the ED Saturday night due to continued nausea and vomiting. States that unisom/B6 seemed to help for two weeks but then stopped working.  Was given Reglan in the ED and felt better when discharged but then vomited ag

## 2021-06-23 ENCOUNTER — TELEPHONE (OUTPATIENT)
Dept: OBGYN CLINIC | Facility: CLINIC | Age: 26
End: 2021-06-23

## 2021-06-23 NOTE — TELEPHONE ENCOUNTER
Spoke to patient, stated she started to take the zofran every 8 hrs as of Monday. Patient stated she was fine on Monday but did have some nausea yesterday afternoon with 2 episodes of vomiting. Patient stated she is feeling fine today.  Denied any lighthead

## 2021-06-24 ENCOUNTER — MOBILE ENCOUNTER (OUTPATIENT)
Dept: OBGYN CLINIC | Facility: CLINIC | Age: 26
End: 2021-06-24

## 2021-06-24 NOTE — PROGRESS NOTES
Pt paged to report she has still been throwing up today. Tried ensure but just threw that up. Pt instructed to stick to clear liquids at this time and consider pedialite or Gatorade for electrolyte replenishment.  To page back if clear liquids not staying d

## 2021-06-28 ENCOUNTER — TELEPHONE (OUTPATIENT)
Dept: OBGYN CLINIC | Facility: CLINIC | Age: 26
End: 2021-06-28

## 2021-06-29 ENCOUNTER — TELEPHONE (OUTPATIENT)
Dept: OBGYN CLINIC | Facility: CLINIC | Age: 26
End: 2021-06-29

## 2021-06-29 ENCOUNTER — HOSPITAL ENCOUNTER (EMERGENCY)
Facility: HOSPITAL | Age: 26
Discharge: HOME | End: 2021-06-29
Admitting: NURSE PRACTITIONER
Payer: MEDICAID

## 2021-06-29 VITALS
WEIGHT: 128 LBS | HEART RATE: 105 BPM | TEMPERATURE: 98 F | RESPIRATION RATE: 18 BRPM | HEIGHT: 61 IN | OXYGEN SATURATION: 99 % | DIASTOLIC BLOOD PRESSURE: 79 MMHG | BODY MASS INDEX: 24.17 KG/M2 | SYSTOLIC BLOOD PRESSURE: 100 MMHG

## 2021-06-29 DIAGNOSIS — O21.0 HYPEREMESIS GRAVIDARUM: Primary | ICD-10-CM

## 2021-06-29 LAB
ALBUMIN SERPL-MCNC: 3.6 G/DL (ref 3.4–5)
ALBUMIN/GLOB SERPL: 0.8 {RATIO} (ref 1–2)
ALP LIVER SERPL-CCNC: 45 U/L
ALT SERPL-CCNC: 19 U/L
ANION GAP SERPL CALC-SCNC: 16 MMOL/L (ref 0–18)
AST SERPL-CCNC: 23 U/L (ref 15–37)
BASOPHILS # BLD AUTO: 0.03 X10(3) UL (ref 0–0.2)
BASOPHILS NFR BLD AUTO: 0.3 %
BILIRUB SERPL-MCNC: 0.5 MG/DL (ref 0.1–2)
BILIRUB UR QL: NEGATIVE
BUN BLD-MCNC: 7 MG/DL (ref 7–18)
BUN/CREAT SERPL: 10.9 (ref 10–20)
CALCIUM BLD-MCNC: 9.5 MG/DL (ref 8.5–10.1)
CHLORIDE SERPL-SCNC: 103 MMOL/L (ref 98–112)
CO2 SERPL-SCNC: 15 MMOL/L (ref 21–32)
COLOR UR: YELLOW
CREAT BLD-MCNC: 0.64 MG/DL
DEPRECATED RDW RBC AUTO: 41.7 FL (ref 35.1–46.3)
EOSINOPHIL # BLD AUTO: 0.06 X10(3) UL (ref 0–0.7)
EOSINOPHIL NFR BLD AUTO: 0.6 %
ERYTHROCYTE [DISTWIDTH] IN BLOOD BY AUTOMATED COUNT: 12.8 % (ref 11–15)
GLOBULIN PLAS-MCNC: 4.3 G/DL (ref 2.8–4.4)
GLUCOSE BLD-MCNC: 70 MG/DL (ref 70–99)
GLUCOSE UR-MCNC: 50 MG/DL
HCT VFR BLD AUTO: 38.3 %
HGB BLD-MCNC: 13.2 G/DL
HGB UR QL STRIP.AUTO: NEGATIVE
HYALINE CASTS #/AREA URNS AUTO: PRESENT /LPF
IMM GRANULOCYTES # BLD AUTO: 0.05 X10(3) UL (ref 0–1)
IMM GRANULOCYTES NFR BLD: 0.5 %
KETONES UR-MCNC: 80 MG/DL
LEUKOCYTE ESTERASE UR QL STRIP.AUTO: NEGATIVE
LIPASE SERPL-CCNC: 109 U/L (ref 73–393)
LYMPHOCYTES # BLD AUTO: 0.95 X10(3) UL (ref 1–4)
LYMPHOCYTES NFR BLD AUTO: 9.7 %
M PROTEIN MFR SERPL ELPH: 7.9 G/DL (ref 6.4–8.2)
MCH RBC QN AUTO: 30.6 PG (ref 26–34)
MCHC RBC AUTO-ENTMCNC: 34.5 G/DL (ref 31–37)
MCV RBC AUTO: 88.9 FL
MONOCYTES # BLD AUTO: 0.53 X10(3) UL (ref 0.1–1)
MONOCYTES NFR BLD AUTO: 5.4 %
NEUTROPHILS # BLD AUTO: 8.21 X10 (3) UL (ref 1.5–7.7)
NEUTROPHILS # BLD AUTO: 8.21 X10(3) UL (ref 1.5–7.7)
NEUTROPHILS NFR BLD AUTO: 83.5 %
NITRITE UR QL STRIP.AUTO: NEGATIVE
OSMOLALITY SERPL CALC.SUM OF ELEC: 274 MOSM/KG (ref 275–295)
PH UR: 5 [PH] (ref 5–8)
PLATELET # BLD AUTO: 290 10(3)UL (ref 150–450)
POTASSIUM SERPL-SCNC: 3.4 MMOL/L (ref 3.5–5.1)
PROT UR-MCNC: 100 MG/DL
RBC # BLD AUTO: 4.31 X10(6)UL
SODIUM SERPL-SCNC: 134 MMOL/L (ref 136–145)
SP GR UR STRIP: >1.03 (ref 1–1.03)
UROBILINOGEN UR STRIP-ACNC: <2
WBC # BLD AUTO: 9.8 X10(3) UL (ref 4–11)

## 2021-06-29 PROCEDURE — 96374 THER/PROPH/DIAG INJ IV PUSH: CPT

## 2021-06-29 PROCEDURE — 83690 ASSAY OF LIPASE: CPT | Performed by: NURSE PRACTITIONER

## 2021-06-29 PROCEDURE — 80053 COMPREHEN METABOLIC PANEL: CPT | Performed by: NURSE PRACTITIONER

## 2021-06-29 PROCEDURE — 85025 COMPLETE CBC W/AUTO DIFF WBC: CPT | Performed by: NURSE PRACTITIONER

## 2021-06-29 PROCEDURE — 81001 URINALYSIS AUTO W/SCOPE: CPT | Performed by: NURSE PRACTITIONER

## 2021-06-29 PROCEDURE — 99284 EMERGENCY DEPT VISIT MOD MDM: CPT

## 2021-06-29 PROCEDURE — 96361 HYDRATE IV INFUSION ADD-ON: CPT

## 2021-06-29 PROCEDURE — 87086 URINE CULTURE/COLONY COUNT: CPT | Performed by: NURSE PRACTITIONER

## 2021-06-29 RX ORDER — METOCLOPRAMIDE HYDROCHLORIDE 5 MG/ML
10 INJECTION INTRAMUSCULAR; INTRAVENOUS ONCE
Status: COMPLETED | OUTPATIENT
Start: 2021-06-29 | End: 2021-06-29

## 2021-06-29 RX ORDER — METOCLOPRAMIDE 10 MG/1
10 TABLET ORAL 3 TIMES DAILY PRN
Qty: 20 TABLET | Refills: 0 | Status: SHIPPED | OUTPATIENT
Start: 2021-06-29 | End: 2021-07-06

## 2021-06-29 NOTE — TELEPHONE ENCOUNTER
Pt states she spoke w/ cnm on call on Weds & last noc. C/o \"throwing everything up. \" was instructed by TM to eat a CLQ diet & go to ER for IVF. Pt states she went to the ER last time & vomited again later that day.  Pt does not want a quick fix but things Spouse

## 2021-06-29 NOTE — ED QUICK NOTES
Pt A&Ox4. Pt denies dizziness/lightheadedness, cp, sob, n/v. Discharge paperwork, prescription, and follow-up discussed with pt, pt verbally understands them. Pt discharged ambulatory with steady gait - no distress noted.

## 2021-06-29 NOTE — TELEPHONE ENCOUNTER
Pt paged to report that she has been trying the clear liquid diet since last Thursday when it was recommended. She is also taking her antiemetics. Nothing is working and she is experiencing emesis immediately after each oral intake.  Pt was advised to to to

## 2021-06-29 NOTE — ED PROVIDER NOTES
Patient Seen in: [facility] Emergency Department      History   Patient presents with:  Hyperemesis Gravidarum    Stated Complaint: 13 weeks preg/ vomiting     HPI/Subjective:   24yo/f 15 weeks pg by US reports with nausea, vomiting x 1 week.  Repo well-developed. HENT:      Head: Normocephalic and atraumatic. Eyes:      Conjunctiva/sclera: Conjunctivae normal.      Pupils: Pupils are equal, round, and reactive to light. Cardiovascular:      Rate and Rhythm: Normal rate and regular rhythm. Narrative: The following orders were created for panel order CBC With Differential With Platelet.   Procedure                               Abnormality         Status                     ---------                               -----------         ------

## 2021-06-29 NOTE — ED INITIAL ASSESSMENT (HPI)
Pt who is 13 weeks pregnant G1 came in for for vomiting and can't keep anything down. Per pt she saw hr OB Monday given Zofran but per pt not helping.

## 2021-06-29 NOTE — ED QUICK NOTES
Pt resting comfortably, no nausea/vomiting noted. Pt reports nausea has improved since medication administration.

## 2021-07-06 ENCOUNTER — TELEPHONE (OUTPATIENT)
Dept: OBGYN CLINIC | Facility: CLINIC | Age: 26
End: 2021-07-06

## 2021-07-06 RX ORDER — METOCLOPRAMIDE 10 MG/1
10 TABLET ORAL 3 TIMES DAILY PRN
Qty: 20 TABLET | Refills: 0 | Status: SHIPPED | OUTPATIENT
Start: 2021-07-06 | End: 2021-07-30

## 2021-07-06 NOTE — TELEPHONE ENCOUNTER
Pt reports she was seen in ER on 6/29 for vomiting and was given Rx for Reglan. Pt states Reglan has been helping with vomiting and would like refill. Pt is able to keep food and liquid down. MBW notified and per MBW refill can be sent to pharmacy.  Pt agre

## 2021-07-07 ENCOUNTER — MOBILE ENCOUNTER (OUTPATIENT)
Dept: OBGYN CLINIC | Facility: CLINIC | Age: 26
End: 2021-07-07

## 2021-07-08 ENCOUNTER — HOSPITAL ENCOUNTER (EMERGENCY)
Facility: HOSPITAL | Age: 26
Discharge: HOME | End: 2021-07-08
Attending: EMERGENCY MEDICINE
Payer: MEDICAID

## 2021-07-08 ENCOUNTER — TELEPHONE (OUTPATIENT)
Dept: OBGYN CLINIC | Facility: CLINIC | Age: 26
End: 2021-07-08

## 2021-07-08 VITALS
RESPIRATION RATE: 13 BRPM | DIASTOLIC BLOOD PRESSURE: 67 MMHG | SYSTOLIC BLOOD PRESSURE: 100 MMHG | HEART RATE: 93 BPM | TEMPERATURE: 99 F | OXYGEN SATURATION: 100 %

## 2021-07-08 DIAGNOSIS — O21.0 HYPEREMESIS GRAVIDARUM: Primary | ICD-10-CM

## 2021-07-08 DIAGNOSIS — K59.00 CONSTIPATION, UNSPECIFIED CONSTIPATION TYPE: ICD-10-CM

## 2021-07-08 DIAGNOSIS — E87.6 HYPOKALEMIA: ICD-10-CM

## 2021-07-08 DIAGNOSIS — N30.00 ACUTE CYSTITIS WITHOUT HEMATURIA: ICD-10-CM

## 2021-07-08 LAB
ALBUMIN SERPL-MCNC: 3.6 G/DL (ref 3.4–5)
ALBUMIN/GLOB SERPL: 0.9 {RATIO} (ref 1–2)
ALP LIVER SERPL-CCNC: 42 U/L
ALT SERPL-CCNC: 42 U/L
ANION GAP SERPL CALC-SCNC: 16 MMOL/L (ref 0–18)
AST SERPL-CCNC: 29 U/L (ref 15–37)
BASOPHILS # BLD AUTO: 0.05 X10(3) UL (ref 0–0.2)
BASOPHILS NFR BLD AUTO: 0.6 %
BILIRUB SERPL-MCNC: 0.6 MG/DL (ref 0.1–2)
BILIRUB UR QL: NEGATIVE
BUN BLD-MCNC: 7 MG/DL (ref 7–18)
BUN/CREAT SERPL: 12.3 (ref 10–20)
CALCIUM BLD-MCNC: 9.6 MG/DL (ref 8.5–10.1)
CHLORIDE SERPL-SCNC: 104 MMOL/L (ref 98–112)
CO2 SERPL-SCNC: 17 MMOL/L (ref 21–32)
COLOR UR: YELLOW
CREAT BLD-MCNC: 0.57 MG/DL
DEPRECATED RDW RBC AUTO: 45.3 FL (ref 35.1–46.3)
EOSINOPHIL # BLD AUTO: 0.13 X10(3) UL (ref 0–0.7)
EOSINOPHIL NFR BLD AUTO: 1.5 %
ERYTHROCYTE [DISTWIDTH] IN BLOOD BY AUTOMATED COUNT: 13.6 % (ref 11–15)
GLOBULIN PLAS-MCNC: 4.1 G/DL (ref 2.8–4.4)
GLUCOSE BLD-MCNC: 83 MG/DL (ref 70–99)
GLUCOSE UR-MCNC: 50 MG/DL
HAV IGM SER QL: 2.2 MG/DL (ref 1.6–2.6)
HCT VFR BLD AUTO: 37.3 %
HGB BLD-MCNC: 12.6 G/DL
HGB UR QL STRIP.AUTO: NEGATIVE
IMM GRANULOCYTES # BLD AUTO: 0.07 X10(3) UL (ref 0–1)
IMM GRANULOCYTES NFR BLD: 0.8 %
KETONES UR-MCNC: 80 MG/DL
LEUKOCYTE ESTERASE UR QL STRIP.AUTO: NEGATIVE
LIPASE SERPL-CCNC: 133 U/L (ref 73–393)
LYMPHOCYTES # BLD AUTO: 1.03 X10(3) UL (ref 1–4)
LYMPHOCYTES NFR BLD AUTO: 12.2 %
M PROTEIN MFR SERPL ELPH: 7.7 G/DL (ref 6.4–8.2)
MCH RBC QN AUTO: 30.8 PG (ref 26–34)
MCHC RBC AUTO-ENTMCNC: 33.8 G/DL (ref 31–37)
MCV RBC AUTO: 91.2 FL
MONOCYTES # BLD AUTO: 0.6 X10(3) UL (ref 0.1–1)
MONOCYTES NFR BLD AUTO: 7.1 %
NEUTROPHILS # BLD AUTO: 6.55 X10 (3) UL (ref 1.5–7.7)
NEUTROPHILS # BLD AUTO: 6.55 X10(3) UL (ref 1.5–7.7)
NEUTROPHILS NFR BLD AUTO: 77.8 %
NITRITE UR QL STRIP.AUTO: NEGATIVE
OSMOLALITY SERPL CALC.SUM OF ELEC: 281 MOSM/KG (ref 275–295)
PH UR: 5 [PH] (ref 5–8)
PLATELET # BLD AUTO: 264 10(3)UL (ref 150–450)
POTASSIUM SERPL-SCNC: 3 MMOL/L (ref 3.5–5.1)
PROT UR-MCNC: 100 MG/DL
RBC # BLD AUTO: 4.09 X10(6)UL
SODIUM SERPL-SCNC: 137 MMOL/L (ref 136–145)
SP GR UR STRIP: 1.03 (ref 1–1.03)
UROBILINOGEN UR STRIP-ACNC: 2
WBC # BLD AUTO: 8.4 X10(3) UL (ref 4–11)

## 2021-07-08 PROCEDURE — 96375 TX/PRO/DX INJ NEW DRUG ADDON: CPT

## 2021-07-08 PROCEDURE — 83690 ASSAY OF LIPASE: CPT | Performed by: EMERGENCY MEDICINE

## 2021-07-08 PROCEDURE — 81001 URINALYSIS AUTO W/SCOPE: CPT | Performed by: EMERGENCY MEDICINE

## 2021-07-08 PROCEDURE — 80053 COMPREHEN METABOLIC PANEL: CPT | Performed by: EMERGENCY MEDICINE

## 2021-07-08 PROCEDURE — 96361 HYDRATE IV INFUSION ADD-ON: CPT

## 2021-07-08 PROCEDURE — 99284 EMERGENCY DEPT VISIT MOD MDM: CPT

## 2021-07-08 PROCEDURE — 96374 THER/PROPH/DIAG INJ IV PUSH: CPT

## 2021-07-08 PROCEDURE — 83735 ASSAY OF MAGNESIUM: CPT | Performed by: EMERGENCY MEDICINE

## 2021-07-08 PROCEDURE — 87086 URINE CULTURE/COLONY COUNT: CPT | Performed by: EMERGENCY MEDICINE

## 2021-07-08 PROCEDURE — 85025 COMPLETE CBC W/AUTO DIFF WBC: CPT | Performed by: EMERGENCY MEDICINE

## 2021-07-08 PROCEDURE — S0028 INJECTION, FAMOTIDINE, 20 MG: HCPCS | Performed by: EMERGENCY MEDICINE

## 2021-07-08 RX ORDER — FAMOTIDINE 10 MG/ML
20 INJECTION, SOLUTION INTRAVENOUS ONCE
Status: COMPLETED | OUTPATIENT
Start: 2021-07-08 | End: 2021-07-08

## 2021-07-08 RX ORDER — POTASSIUM CHLORIDE 1.5 G/1.77G
40 POWDER, FOR SOLUTION ORAL ONCE
Status: DISCONTINUED | OUTPATIENT
Start: 2021-07-08 | End: 2021-07-08

## 2021-07-08 RX ORDER — DOXYLAMINE SUCCINATE AND PYRIDOXINE HYDROCHLORIDE, DELAYED RELEASE TABLETS 10 MG/10 MG 10; 10 MG/1; MG/1
2 TABLET, DELAYED RELEASE ORAL NIGHTLY
Qty: 20 TABLET | Refills: 0 | Status: SHIPPED | OUTPATIENT
Start: 2021-07-08 | End: 2021-07-12

## 2021-07-08 RX ORDER — POTASSIUM CHLORIDE 20 MEQ/1
40 TABLET, EXTENDED RELEASE ORAL ONCE
Status: COMPLETED | OUTPATIENT
Start: 2021-07-08 | End: 2021-07-08

## 2021-07-08 RX ORDER — NITROFURANTOIN 25; 75 MG/1; MG/1
100 CAPSULE ORAL 2 TIMES DAILY
Qty: 14 CAPSULE | Refills: 0 | Status: SHIPPED | OUTPATIENT
Start: 2021-07-08 | End: 2021-07-15

## 2021-07-08 RX ORDER — ONDANSETRON 2 MG/ML
4 INJECTION INTRAMUSCULAR; INTRAVENOUS ONCE
Status: COMPLETED | OUTPATIENT
Start: 2021-07-08 | End: 2021-07-08

## 2021-07-08 RX ORDER — MAGNESIUM CARB/ALUMINUM HYDROX 105-160MG
148 TABLET,CHEWABLE ORAL ONCE
Qty: 148 ML | Refills: 0 | Status: SHIPPED | OUTPATIENT
Start: 2021-07-08 | End: 2021-07-08

## 2021-07-08 NOTE — ED INITIAL ASSESSMENT (HPI)
N/v ongoing for several weeks with increased vomiting over the past 4 days. Denies pain. 14 weeks preg seen at [facility] end of June for hyperemesis.

## 2021-07-08 NOTE — PROGRESS NOTES
Pc from pt to answering service reports constipation has not had BM in 3 weeks. Taking reglan for N & v in pregnancy. Is 14 weeks. But for last 3 days has been vomiting and unable to keep food or fluids down for 3 days.  Has taken stool softener, prune juic

## 2021-07-08 NOTE — ED PROVIDER NOTES
Patient Seen in: [facility] Emergency Department      History   Patient presents with:  Nausea/vomiting  Pregnancy Issues    Stated Complaint: vomitting; 14 wks preg.     HPI/Subjective:   HPI    28-year-old here about 10 days ago with hyperemesis normal. No respiratory distress. Abdominal: Soft. Suspected early gravid uterus in the lower abdomen. Mild epigastric pain. No signs of guarding or peritoneal signs at this time. Musculoskeletal: Normal range of motion. No edema or tenderness.    Dominique Scheuermann addition to her other antiemetics. She knows to follow-up with her midwife, she was given a OB referral will return with any worsening or change.   Urine culture will be sent but given the patient is in her second trimester we will treat this bacteriuria a

## 2021-07-08 NOTE — TELEPHONE ENCOUNTER
Patient states she is getting ready to head over the emergency room, but wants to know when can she get into the maternity fields. Patient states she feels the emergency room is not doing enough.

## 2021-07-09 ENCOUNTER — TELEPHONE (OUTPATIENT)
Dept: OBGYN CLINIC | Facility: CLINIC | Age: 26
End: 2021-07-09

## 2021-07-09 NOTE — TELEPHONE ENCOUNTER
Called pt to f/u after ER visit yesterday per Prakash Rios. Pt states she was given Rx for Diclegis last night in ER. Pt states she just woke up so she has not had anything to eat yet and is not sure if medication is helping.  Pt states she was also told to take magn

## 2021-07-09 NOTE — TELEPHONE ENCOUNTER
14w6d states seeking transfer of care due to “not feeling comfortable with midwives“. Advised of rotating male and female practice. Advised first appt is OBN-PC is with nurse. Pt confirms taking PNV with DHA, FA and iron.  Pt confirms regular cycle every 28

## 2021-07-10 ENCOUNTER — TELEPHONE (OUTPATIENT)
Dept: OBGYN CLINIC | Facility: CLINIC | Age: 26
End: 2021-07-10

## 2021-07-11 NOTE — TELEPHONE ENCOUNTER
PC to patient to f/u from Er visit. She received IV fluids and PO potassium. They gave her magnesium citrate and she was able to keep down and have a BM. They gave her diclegis which has been helping.  Was told side effect is fast heart rate and does feel h

## 2021-07-12 ENCOUNTER — TELEPHONE (OUTPATIENT)
Dept: OBGYN CLINIC | Facility: CLINIC | Age: 26
End: 2021-07-12

## 2021-07-12 ENCOUNTER — ROUTINE PRENATAL (OUTPATIENT)
Dept: OBGYN CLINIC | Facility: CLINIC | Age: 26
End: 2021-07-12
Payer: MEDICAID

## 2021-07-12 VITALS — BODY MASS INDEX: 23 KG/M2 | WEIGHT: 120.81 LBS | SYSTOLIC BLOOD PRESSURE: 118 MMHG | DIASTOLIC BLOOD PRESSURE: 76 MMHG

## 2021-07-12 DIAGNOSIS — O21.9 NAUSEA/VOMITING IN PREGNANCY: Primary | ICD-10-CM

## 2021-07-12 PROCEDURE — 0502F SUBSEQUENT PRENATAL CARE: CPT | Performed by: ADVANCED PRACTICE MIDWIFE

## 2021-07-12 PROCEDURE — 3074F SYST BP LT 130 MM HG: CPT | Performed by: ADVANCED PRACTICE MIDWIFE

## 2021-07-12 PROCEDURE — 3078F DIAST BP <80 MM HG: CPT | Performed by: ADVANCED PRACTICE MIDWIFE

## 2021-07-12 RX ORDER — MAGNESIUM CARB/ALUMINUM HYDROX 105-160MG
TABLET,CHEWABLE ORAL
COMMUNITY
Start: 2021-07-08 | End: 2021-12-03

## 2021-07-12 RX ORDER — DOXYLAMINE SUCCINATE AND PYRIDOXINE HYDROCHLORIDE, DELAYED RELEASE TABLETS 10 MG/10 MG 10; 10 MG/1; MG/1
2 TABLET, DELAYED RELEASE ORAL NIGHTLY
Qty: 60 TABLET | Refills: 0 | Status: SHIPPED | OUTPATIENT
Start: 2021-07-12 | End: 2021-07-30

## 2021-07-12 NOTE — TELEPHONE ENCOUNTER
Tried calling pt and phone rings and then goes to busy signal. Pt was scheduled for OBN PC at 10am this morning but due to mistake , pt was not called. There are openings for OBN PC this Saturday that pt can be rescheduled to.

## 2021-07-12 NOTE — TELEPHONE ENCOUNTER
----- Message from Yadi Farrar CNM sent at 7/12/2021 12:19 PM CDT -----  Regarding: Prior Auth  This patient needs a prior auth completed for diclegis. She has lost 17 lbs this pregnancy so far and it is the only thing working for her.  Thanks Lissett[employer]

## 2021-07-12 NOTE — PROGRESS NOTES
Able to eat well now - had mcdonalds, meatloaf water apple juice in the last 24 hours. Taking Diclegis 2 tabs at nighttime. Refill sent to pharmacy.

## 2021-07-17 ENCOUNTER — NURSE ONLY (OUTPATIENT)
Dept: OBGYN CLINIC | Facility: CLINIC | Age: 26
End: 2021-07-17
Payer: MEDICAID

## 2021-07-17 DIAGNOSIS — Z34.90 ENCOUNTER FOR SUPERVISION OF NORMAL PREGNANCY, ANTEPARTUM, UNSPECIFIED GRAVIDITY: Primary | ICD-10-CM

## 2021-07-29 ENCOUNTER — TELEPHONE (OUTPATIENT)
Dept: OBGYN CLINIC | Facility: CLINIC | Age: 26
End: 2021-07-29

## 2021-07-29 NOTE — TELEPHONE ENCOUNTER
17w5d, pt is transfer from [facility]. She was prescribed Diclegis 10 mg 2 tablets PO at Night for sever N/V. Pt had lost almost 20lbs during pregnancy due to vomiting.  Pt states Diclegis had helped for some time, but starting 2 days ago she again started vo

## 2021-07-29 NOTE — TELEPHONE ENCOUNTER
Called and spoke to pt about Diclegis protocol. Instructed to take addtl pill in the morning along with her 2 pills at [facility]. If by day 3 she is still vomiting, she is to add an addtl pill in the afternoon.  She will then take 1 pill in the morning, 1 in the

## 2021-07-30 ENCOUNTER — INITIAL PRENATAL (OUTPATIENT)
Dept: OBGYN CLINIC | Facility: CLINIC | Age: 26
End: 2021-07-30
Payer: MEDICAID

## 2021-07-30 VITALS
HEART RATE: 116 BPM | WEIGHT: 125 LBS | BODY MASS INDEX: 24 KG/M2 | DIASTOLIC BLOOD PRESSURE: 61 MMHG | SYSTOLIC BLOOD PRESSURE: 93 MMHG

## 2021-07-30 DIAGNOSIS — O21.9 NAUSEA/VOMITING IN PREGNANCY: ICD-10-CM

## 2021-07-30 DIAGNOSIS — Z34.02 ENCOUNTER FOR SUPERVISION OF NORMAL FIRST PREGNANCY IN SECOND TRIMESTER: Primary | ICD-10-CM

## 2021-07-30 PROBLEM — Z33.1 PREGNANT STATE, INCIDENTAL (HCC): Status: ACTIVE | Noted: 2021-07-30

## 2021-07-30 PROBLEM — Z33.1 PREGNANT STATE, INCIDENTAL: Status: ACTIVE | Noted: 2021-07-30

## 2021-07-30 LAB
APPEARANCE: CLEAR
GLUCOSE (URINE DIPSTICK): NEGATIVE MG/DL
KETONES (URINE DIPSTICK): >=160 MG/DL
MULTISTIX LOT#: ABNORMAL NUMERIC
NITRITE, URINE: NEGATIVE
PROTEIN (URINE DIPSTICK): 30 MG/DL
URINE-COLOR: YELLOW

## 2021-07-30 PROCEDURE — 81002 URINALYSIS NONAUTO W/O SCOPE: CPT | Performed by: OBSTETRICS & GYNECOLOGY

## 2021-07-30 PROCEDURE — 3078F DIAST BP <80 MM HG: CPT | Performed by: OBSTETRICS & GYNECOLOGY

## 2021-07-30 PROCEDURE — 0502F SUBSEQUENT PRENATAL CARE: CPT | Performed by: OBSTETRICS & GYNECOLOGY

## 2021-07-30 PROCEDURE — 3074F SYST BP LT 130 MM HG: CPT | Performed by: OBSTETRICS & GYNECOLOGY

## 2021-07-30 RX ORDER — DOXYLAMINE SUCCINATE AND PYRIDOXINE HYDROCHLORIDE, DELAYED RELEASE TABLETS 10 MG/10 MG 10; 10 MG/1; MG/1
2 TABLET, DELAYED RELEASE ORAL AS DIRECTED
Qty: 120 TABLET | Refills: 2 | Status: SHIPPED | OUTPATIENT
Start: 2021-07-30 | End: 2021-08-29

## 2021-07-30 NOTE — PROGRESS NOTES
Transfer of care from Midwives. Vomitting recurred recently. No fever / chills. No sick contacts. Was only using diclegis at night. Tried adding am dose just recently. Reviewed methods for hyperemesis. Wishes for breast pump.

## 2021-08-05 ENCOUNTER — TELEPHONE (OUTPATIENT)
Dept: OBGYN CLINIC | Facility: CLINIC | Age: 26
End: 2021-08-05

## 2021-08-05 RX ORDER — BREAST PUMP
EACH MISCELLANEOUS
Qty: 1 EACH | Refills: 0 | Status: SHIPPED | OUTPATIENT
Start: 2021-08-05

## 2021-08-05 NOTE — TELEPHONE ENCOUNTER
Received order request from [employer] for double electric breast pump. Order placed in the computer, [employer] order filled out and faxed back. Originals sent to scanning.

## 2021-08-13 ENCOUNTER — HOSPITAL ENCOUNTER (OUTPATIENT)
Facility: HOSPITAL | Age: 26
Setting detail: OBSERVATION
Discharge: HOME | End: 2021-08-14
Attending: OBSTETRICS & GYNECOLOGY | Admitting: OBSTETRICS & GYNECOLOGY
Payer: MEDICAID

## 2021-08-13 VITALS
HEART RATE: 112 BPM | DIASTOLIC BLOOD PRESSURE: 64 MMHG | TEMPERATURE: 98 F | SYSTOLIC BLOOD PRESSURE: 103 MMHG | RESPIRATION RATE: 16 BRPM

## 2021-08-13 PROBLEM — Z34.90 PREGNANCY: Status: ACTIVE | Noted: 2021-08-13

## 2021-08-13 PROBLEM — Z34.90 PREGNANCY (HCC): Status: ACTIVE | Noted: 2021-08-13

## 2021-08-13 LAB
BILIRUB UR QL: NEGATIVE
CLARITY UR: CLEAR
COLOR UR: YELLOW
GLUCOSE UR-MCNC: >=500 MG/DL
HGB UR QL STRIP.AUTO: NEGATIVE
KETONES UR-MCNC: NEGATIVE MG/DL
LEUKOCYTE ESTERASE UR QL STRIP.AUTO: NEGATIVE
NITRITE UR QL STRIP.AUTO: NEGATIVE
PH UR: 8 [PH] (ref 5–8)
PROT UR-MCNC: NEGATIVE MG/DL
SP GR UR STRIP: 1.01 (ref 1–1.03)
UROBILINOGEN UR STRIP-ACNC: 2

## 2021-08-13 RX ORDER — ACETAMINOPHEN 500 MG
1000 TABLET ORAL ONCE
Status: COMPLETED | OUTPATIENT
Start: 2021-08-13 | End: 2021-08-13

## 2021-08-13 RX ORDER — ACETAMINOPHEN 500 MG
TABLET ORAL
Status: COMPLETED
Start: 2021-08-13 | End: 2021-08-13

## 2021-08-14 PROCEDURE — 59025 FETAL NON-STRESS TEST: CPT | Performed by: OBSTETRICS & GYNECOLOGY

## 2021-08-14 NOTE — PROGRESS NOTES
Pt is a 23year old female admitted to TR1/TR1-A.    Patient presents with:  Abdominal Pain: pt c/o sharp pain on low right abdomen that increases w/ urination and some lower abdominal pressure; denies VB, LOF and ctx     Pt is  19w6d intra-uterine p

## 2021-08-14 NOTE — TRIAGE
[facility]      Triage Note    Teresita Godoy Patient Status:  Observation    10/4/1995 MRN [id number]   Location [address] [facility] Attending Alexander Maier MD   Hosp Day # 0 PCP MARY Souza Additional Comments       Patient presents with:  Abdominal Pain: pt c/o sharp pain on low right abdomen that increase

## 2021-08-18 ENCOUNTER — TELEPHONE (OUTPATIENT)
Dept: OBGYN CLINIC | Facility: CLINIC | Age: 26
End: 2021-08-18

## 2021-08-18 ENCOUNTER — HOSPITAL ENCOUNTER (OUTPATIENT)
Dept: ULTRASOUND IMAGING | Age: 26
Discharge: HOME | End: 2021-08-18
Attending: OBSTETRICS & GYNECOLOGY
Payer: MEDICAID

## 2021-08-18 DIAGNOSIS — Z34.02 ENCOUNTER FOR SUPERVISION OF NORMAL FIRST PREGNANCY IN SECOND TRIMESTER: ICD-10-CM

## 2021-08-18 PROCEDURE — 76805 OB US >/= 14 WKS SNGL FETUS: CPT | Performed by: OBSTETRICS & GYNECOLOGY

## 2021-08-18 NOTE — TELEPHONE ENCOUNTER
Dillon from insurance verification is requesting prior authorization for today's outpatient ultrasound. Dillon states the authorization needs to be done through [employer].    Procedure code: 71398

## 2021-08-26 ENCOUNTER — ROUTINE PRENATAL (OUTPATIENT)
Dept: OBGYN CLINIC | Facility: CLINIC | Age: 26
End: 2021-08-26
Payer: MEDICAID

## 2021-08-26 VITALS
BODY MASS INDEX: 25 KG/M2 | HEART RATE: 118 BPM | DIASTOLIC BLOOD PRESSURE: 67 MMHG | WEIGHT: 134 LBS | SYSTOLIC BLOOD PRESSURE: 102 MMHG

## 2021-08-26 DIAGNOSIS — Z34.92 ENCOUNTER FOR SUPERVISION OF NORMAL PREGNANCY IN SECOND TRIMESTER, UNSPECIFIED GRAVIDITY: Primary | ICD-10-CM

## 2021-08-26 LAB
APPEARANCE: CLEAR
BILIRUBIN: NEGATIVE
GLUCOSE (URINE DIPSTICK): 500 MG/DL
KETONES (URINE DIPSTICK): NEGATIVE MG/DL
LEUKOCYTES: NEGATIVE
MULTISTIX LOT#: ABNORMAL NUMERIC
NITRITE, URINE: NEGATIVE
OCCULT BLOOD: NEGATIVE
PH, URINE: 6.5 (ref 4.5–8)
SPECIFIC GRAVITY: 1.02 (ref 1–1.03)
URINE-COLOR: YELLOW
UROBILINOGEN,SEMI-QN: 1 MG/DL (ref 0–1.9)

## 2021-08-26 PROCEDURE — 81002 URINALYSIS NONAUTO W/O SCOPE: CPT | Performed by: CLINICAL NURSE SPECIALIST

## 2021-08-26 PROCEDURE — 3074F SYST BP LT 130 MM HG: CPT | Performed by: CLINICAL NURSE SPECIALIST

## 2021-08-26 PROCEDURE — 0502F SUBSEQUENT PRENATAL CARE: CPT | Performed by: CLINICAL NURSE SPECIALIST

## 2021-08-26 PROCEDURE — 3078F DIAST BP <80 MM HG: CPT | Performed by: CLINICAL NURSE SPECIALIST

## 2021-08-27 NOTE — PROGRESS NOTES
Nausea and vomiting has resolved. Still taking Diclegis in the evening. Ate waffles with syrup and OJ before appt (reviewed glucose in urine and trying to make good dietary decisions). 20 wk US normal-having a boy. Denies any questions or concerns.  Aware 2

## 2021-09-15 ENCOUNTER — TELEPHONE (OUTPATIENT)
Dept: OBGYN CLINIC | Facility: CLINIC | Age: 26
End: 2021-09-15

## 2021-09-15 DIAGNOSIS — Z34.92 ENCOUNTER FOR SUPERVISION OF NORMAL PREGNANCY IN SECOND TRIMESTER, UNSPECIFIED GRAVIDITY: Primary | ICD-10-CM

## 2021-09-15 NOTE — TELEPHONE ENCOUNTER
Patient states she needs to take a glucose test, but does not know when is the recommended time to take it.

## 2021-09-15 NOTE — TELEPHONE ENCOUNTER
24w4d. Pt calling stating that she was told by [facility] that she would need to have her glucose check. Pt informed that [facility] notes state pt should will have 2T labs at next appointment.  Pt states that glucose has been noted in her urine on several occasions and

## 2021-09-16 NOTE — TELEPHONE ENCOUNTER
Called and informed pt that order was placed for 1 hr gtt and Cbc. Pt informed she does not have to fast for these labs, but it does take one hour for labs to be done. Pt states understanding.

## 2021-09-17 ENCOUNTER — LAB ENCOUNTER (OUTPATIENT)
Dept: LAB | Facility: HOSPITAL | Age: 26
End: 2021-09-17
Attending: OBSTETRICS & GYNECOLOGY
Payer: MEDICAID

## 2021-09-17 DIAGNOSIS — Z34.92 ENCOUNTER FOR SUPERVISION OF NORMAL PREGNANCY IN SECOND TRIMESTER, UNSPECIFIED GRAVIDITY: ICD-10-CM

## 2021-09-17 LAB
DEPRECATED RDW RBC AUTO: 47.4 FL (ref 35.1–46.3)
ERYTHROCYTE [DISTWIDTH] IN BLOOD BY AUTOMATED COUNT: 13.3 % (ref 11–15)
GLUCOSE 1H P GLC SERPL-MCNC: 120 MG/DL
HCT VFR BLD AUTO: 33.7 %
HGB BLD-MCNC: 10.8 G/DL
MCH RBC QN AUTO: 30.9 PG (ref 26–34)
MCHC RBC AUTO-ENTMCNC: 32 G/DL (ref 31–37)
MCV RBC AUTO: 96.6 FL
PLATELET # BLD AUTO: 241 10(3)UL (ref 150–450)
RBC # BLD AUTO: 3.49 X10(6)UL
WBC # BLD AUTO: 12.7 X10(3) UL (ref 4–11)

## 2021-09-17 PROCEDURE — 36415 COLL VENOUS BLD VENIPUNCTURE: CPT

## 2021-09-17 PROCEDURE — 82950 GLUCOSE TEST: CPT

## 2021-09-17 PROCEDURE — 85027 COMPLETE CBC AUTOMATED: CPT

## 2021-09-20 ENCOUNTER — ROUTINE PRENATAL (OUTPATIENT)
Dept: OBGYN CLINIC | Facility: CLINIC | Age: 26
End: 2021-09-20
Payer: MEDICAID

## 2021-09-20 VITALS
WEIGHT: 141.63 LBS | HEART RATE: 121 BPM | BODY MASS INDEX: 27 KG/M2 | SYSTOLIC BLOOD PRESSURE: 110 MMHG | DIASTOLIC BLOOD PRESSURE: 73 MMHG

## 2021-09-20 DIAGNOSIS — Z34.82 ENCOUNTER FOR SUPERVISION OF OTHER NORMAL PREGNANCY IN SECOND TRIMESTER: Primary | ICD-10-CM

## 2021-09-20 LAB
APPEARANCE: CLEAR
BILIRUBIN: NEGATIVE
GLUCOSE (URINE DIPSTICK): >=1000 MG/DL
LEUKOCYTES: NEGATIVE
MULTISTIX LOT#: ABNORMAL NUMERIC
NITRITE, URINE: NEGATIVE
OCCULT BLOOD: NEGATIVE
PH, URINE: 6.5 (ref 4.5–8)
PROTEIN (URINE DIPSTICK): NEGATIVE MG/DL
SPECIFIC GRAVITY: 1.02 (ref 1–1.03)
URINE-COLOR: YELLOW
UROBILINOGEN,SEMI-QN: 0.2 MG/DL (ref 0–1.9)

## 2021-09-20 PROCEDURE — 0502F SUBSEQUENT PRENATAL CARE: CPT | Performed by: OBSTETRICS & GYNECOLOGY

## 2021-09-20 PROCEDURE — 3074F SYST BP LT 130 MM HG: CPT | Performed by: OBSTETRICS & GYNECOLOGY

## 2021-09-20 PROCEDURE — 81002 URINALYSIS NONAUTO W/O SCOPE: CPT | Performed by: OBSTETRICS & GYNECOLOGY

## 2021-09-20 PROCEDURE — 3078F DIAST BP <80 MM HG: CPT | Performed by: OBSTETRICS & GYNECOLOGY

## 2021-09-23 NOTE — PROGRESS NOTES
Boy. Mom at visit. No S/S of PTL. Significant glucosuria but passed GCT well below range. She is eating sugary breakfast foods.

## 2021-09-28 ENCOUNTER — TELEPHONE (OUTPATIENT)
Dept: OBGYN CLINIC | Facility: CLINIC | Age: 26
End: 2021-09-28

## 2021-10-05 ENCOUNTER — ROUTINE PRENATAL (OUTPATIENT)
Dept: OBGYN CLINIC | Facility: CLINIC | Age: 26
End: 2021-10-05
Payer: MEDICAID

## 2021-10-05 VITALS
WEIGHT: 143 LBS | SYSTOLIC BLOOD PRESSURE: 104 MMHG | HEART RATE: 109 BPM | BODY MASS INDEX: 27 KG/M2 | DIASTOLIC BLOOD PRESSURE: 68 MMHG

## 2021-10-05 DIAGNOSIS — Z34.92 ENCOUNTER FOR SUPERVISION OF NORMAL PREGNANCY IN SECOND TRIMESTER, UNSPECIFIED GRAVIDITY: Primary | ICD-10-CM

## 2021-10-05 PROCEDURE — 3074F SYST BP LT 130 MM HG: CPT | Performed by: OBSTETRICS & GYNECOLOGY

## 2021-10-05 PROCEDURE — 0502F SUBSEQUENT PRENATAL CARE: CPT | Performed by: OBSTETRICS & GYNECOLOGY

## 2021-10-05 PROCEDURE — 81002 URINALYSIS NONAUTO W/O SCOPE: CPT | Performed by: OBSTETRICS & GYNECOLOGY

## 2021-10-05 PROCEDURE — 3078F DIAST BP <80 MM HG: CPT | Performed by: OBSTETRICS & GYNECOLOGY

## 2021-10-05 RX ORDER — BREAST PUMP
EACH MISCELLANEOUS
Qty: 1 EACH | Refills: 0 | Status: SHIPPED | OUTPATIENT
Start: 2021-10-05

## 2021-10-20 ENCOUNTER — TELEPHONE (OUTPATIENT)
Dept: OBGYN CLINIC | Facility: CLINIC | Age: 26
End: 2021-10-20

## 2021-10-20 NOTE — TELEPHONE ENCOUNTER
Pt is 29w4d, reports external vaginal itching. Denies abnormal discharge and odor. States she has had yeast infections before and normally starts with itching. Advised pt she can use Monistat 7 day tx. Pt agrees.

## 2021-10-23 ENCOUNTER — ROUTINE PRENATAL (OUTPATIENT)
Dept: OBGYN CLINIC | Facility: CLINIC | Age: 26
End: 2021-10-23
Payer: MEDICAID

## 2021-10-23 VITALS
SYSTOLIC BLOOD PRESSURE: 100 MMHG | DIASTOLIC BLOOD PRESSURE: 65 MMHG | HEART RATE: 114 BPM | WEIGHT: 145.19 LBS | BODY MASS INDEX: 27 KG/M2

## 2021-10-23 DIAGNOSIS — Z34.83 ENCOUNTER FOR SUPERVISION OF OTHER NORMAL PREGNANCY IN THIRD TRIMESTER: Primary | ICD-10-CM

## 2021-10-23 PROCEDURE — 3074F SYST BP LT 130 MM HG: CPT | Performed by: OBSTETRICS & GYNECOLOGY

## 2021-10-23 PROCEDURE — 81002 URINALYSIS NONAUTO W/O SCOPE: CPT | Performed by: OBSTETRICS & GYNECOLOGY

## 2021-10-23 PROCEDURE — 3078F DIAST BP <80 MM HG: CPT | Performed by: OBSTETRICS & GYNECOLOGY

## 2021-10-23 PROCEDURE — 0502F SUBSEQUENT PRENATAL CARE: CPT | Performed by: OBSTETRICS & GYNECOLOGY

## 2021-10-23 NOTE — PROGRESS NOTES
Declines flu and tdap. Labial bump noticed. C/w folliculitis and decompressed in office today. Taking monistat for vulvovaginal itching. Requesting culture. Culture collected but only medicine in vagina seen. RTC 2 wks.

## 2021-11-01 ENCOUNTER — ROUTINE PRENATAL (OUTPATIENT)
Dept: OBGYN CLINIC | Facility: CLINIC | Age: 26
End: 2021-11-01
Payer: MEDICAID

## 2021-11-01 VITALS
WEIGHT: 148.38 LBS | HEART RATE: 114 BPM | DIASTOLIC BLOOD PRESSURE: 66 MMHG | SYSTOLIC BLOOD PRESSURE: 99 MMHG | BODY MASS INDEX: 28 KG/M2

## 2021-11-01 DIAGNOSIS — Z34.90 ENCOUNTER FOR SUPERVISION OF NORMAL PREGNANCY, ANTEPARTUM, UNSPECIFIED GRAVIDITY: Primary | ICD-10-CM

## 2021-11-01 PROCEDURE — 82947 ASSAY GLUCOSE BLOOD QUANT: CPT | Performed by: OBSTETRICS & GYNECOLOGY

## 2021-11-01 PROCEDURE — 3078F DIAST BP <80 MM HG: CPT | Performed by: OBSTETRICS & GYNECOLOGY

## 2021-11-01 PROCEDURE — 0502F SUBSEQUENT PRENATAL CARE: CPT | Performed by: OBSTETRICS & GYNECOLOGY

## 2021-11-01 PROCEDURE — 3074F SYST BP LT 130 MM HG: CPT | Performed by: OBSTETRICS & GYNECOLOGY

## 2021-11-01 PROCEDURE — 81002 URINALYSIS NONAUTO W/O SCOPE: CPT | Performed by: OBSTETRICS & GYNECOLOGY

## 2021-11-08 ENCOUNTER — HOSPITAL ENCOUNTER (OUTPATIENT)
Facility: HOSPITAL | Age: 26
Setting detail: OBSERVATION
Discharge: HOME | End: 2021-11-09
Attending: OBSTETRICS & GYNECOLOGY | Admitting: OBSTETRICS & GYNECOLOGY
Payer: MEDICAID

## 2021-11-08 PROBLEM — Z34.90 PREGNANCY (HCC): Status: ACTIVE | Noted: 2021-11-08

## 2021-11-08 PROBLEM — Z34.90 PREGNANCY: Status: ACTIVE | Noted: 2021-11-08

## 2021-11-08 RX ORDER — SODIUM CHLORIDE, SODIUM LACTATE, POTASSIUM CHLORIDE, CALCIUM CHLORIDE 600; 310; 30; 20 MG/100ML; MG/100ML; MG/100ML; MG/100ML
INJECTION, SOLUTION INTRAVENOUS ONCE
Status: COMPLETED | OUTPATIENT
Start: 2021-11-08 | End: 2021-11-09

## 2021-11-08 RX ORDER — SODIUM CHLORIDE 0.9 % (FLUSH) 0.9 %
2 SYRINGE (ML) INJECTION EVERY 8 HOURS
Status: DISCONTINUED | OUTPATIENT
Start: 2021-11-08 | End: 2021-11-09

## 2021-11-08 RX ORDER — SODIUM CHLORIDE, SODIUM LACTATE, POTASSIUM CHLORIDE, CALCIUM CHLORIDE 600; 310; 30; 20 MG/100ML; MG/100ML; MG/100ML; MG/100ML
INJECTION, SOLUTION INTRAVENOUS CONTINUOUS
Status: DISCONTINUED | OUTPATIENT
Start: 2021-11-08 | End: 2021-11-08

## 2021-11-08 RX ORDER — SODIUM CHLORIDE 0.9 % (FLUSH) 0.9 %
2 SYRINGE (ML) INJECTION AS NEEDED
Status: DISCONTINUED | OUTPATIENT
Start: 2021-11-08 | End: 2021-11-09

## 2021-11-09 ENCOUNTER — TELEPHONE (OUTPATIENT)
Dept: OBGYN CLINIC | Facility: CLINIC | Age: 26
End: 2021-11-09

## 2021-11-09 ENCOUNTER — HOSPITAL ENCOUNTER (OUTPATIENT)
Facility: HOSPITAL | Age: 26
Setting detail: OBSERVATION
End: 2021-11-09
Attending: OBSTETRICS & GYNECOLOGY | Admitting: OBSTETRICS & GYNECOLOGY
Payer: MEDICAID

## 2021-11-09 VITALS
HEART RATE: 114 BPM | TEMPERATURE: 98 F | OXYGEN SATURATION: 100 % | DIASTOLIC BLOOD PRESSURE: 63 MMHG | SYSTOLIC BLOOD PRESSURE: 109 MMHG

## 2021-11-09 PROCEDURE — 59025 FETAL NON-STRESS TEST: CPT | Performed by: OBSTETRICS & GYNECOLOGY

## 2021-11-09 RX ORDER — TERBUTALINE SULFATE 1 MG/ML
INJECTION, SOLUTION SUBCUTANEOUS
Status: COMPLETED
Start: 2021-11-09 | End: 2021-11-09

## 2021-11-09 RX ORDER — TERBUTALINE SULFATE 1 MG/ML
0.25 INJECTION, SOLUTION SUBCUTANEOUS
Status: ACTIVE | OUTPATIENT
Start: 2021-11-09 | End: 2021-11-09

## 2021-11-09 NOTE — TELEPHONE ENCOUNTER
Spoke with NJG who agrees pt should go back to triage for eval. Pt informed and verbalized understanding. Pt will go to triage now. Sejal Harrell from [facility] notified.

## 2021-11-09 NOTE — TRIAGE
[facility]      Triage Note    Eriberto Moment Patient Status:  Observation    10/4/1995 MRN [id number]   Location [address] [facility] Attending Marck Wells MD   Hosp Day # 0 PCP MARY Olguin: Rolette Poag Temp: 98.4 °F (36.9 °C)     TempSrc: Oral     SpO2: 100% 100% 100%       NST  Variability: Moderate           Accelerations: Yes           Decelerations: None            Baseline: 155 BPM           Uterine Irritability: No           Contractions: Irregul

## 2021-11-09 NOTE — TELEPHONE ENCOUNTER
Pt 32w3d calling to report that she started having contractions again. Pt stated that she went to triage last night with spotting and cramping. Pt stated she was placed on monitor and was contractions every 2-5 minutes.  Pt received terbutaline x 1 dose in

## 2021-11-09 NOTE — PROGRESS NOTES
Pt is a 27year old female admitted to TR2/TR2-A. Patient presents with:  R/o  Labor     Pt is  32w3d intra-uterine pregnancy. History obtained, consents signed. Oriented to room, staff, and plan of care.

## 2021-11-09 NOTE — TELEPHONE ENCOUNTER
Pt is 32w3d and states she's been having contractions 3-4 min apart for the last hr.  Please advise no

## 2021-11-11 NOTE — TELEPHONE ENCOUNTER
Called patient to follow-up. She states she did not go to [facility] on 11/9- she laid down and contractions stopped that day. At this time, she is not having any painful contractions.  She has episodes of mild abdominal tightening and pelvic pressure coming k[place]

## 2021-11-14 ENCOUNTER — TELEPHONE (OUTPATIENT)
Dept: OBGYN CLINIC | Facility: CLINIC | Age: 26
End: 2021-11-14

## 2021-11-15 NOTE — TELEPHONE ENCOUNTER
On call-- 32 weeks c/o low back cramping and pressure. Hasn't been timing it. Feels like a constant cramp. No vb or LOF.  +FM. Moved bowels last yesterday. Urinating w/o issues.   Encouraged to drink 32 oz of water now and if 4 or more contractions in

## 2021-11-19 ENCOUNTER — ROUTINE PRENATAL (OUTPATIENT)
Dept: OBGYN CLINIC | Facility: CLINIC | Age: 26
End: 2021-11-19
Payer: MEDICAID

## 2021-11-19 ENCOUNTER — LAB ENCOUNTER (OUTPATIENT)
Dept: LAB | Facility: HOSPITAL | Age: 26
End: 2021-11-19
Attending: OBSTETRICS & GYNECOLOGY
Payer: MEDICAID

## 2021-11-19 VITALS
HEART RATE: 121 BPM | SYSTOLIC BLOOD PRESSURE: 105 MMHG | WEIGHT: 151.19 LBS | BODY MASS INDEX: 29 KG/M2 | DIASTOLIC BLOOD PRESSURE: 71 MMHG

## 2021-11-19 DIAGNOSIS — Z34.83 ENCOUNTER FOR SUPERVISION OF OTHER NORMAL PREGNANCY IN THIRD TRIMESTER: Primary | ICD-10-CM

## 2021-11-19 PROCEDURE — 3078F DIAST BP <80 MM HG: CPT | Performed by: OBSTETRICS & GYNECOLOGY

## 2021-11-19 PROCEDURE — 3074F SYST BP LT 130 MM HG: CPT | Performed by: OBSTETRICS & GYNECOLOGY

## 2021-11-19 PROCEDURE — 36415 COLL VENOUS BLD VENIPUNCTURE: CPT | Performed by: OBSTETRICS & GYNECOLOGY

## 2021-11-19 PROCEDURE — 85027 COMPLETE CBC AUTOMATED: CPT | Performed by: OBSTETRICS & GYNECOLOGY

## 2021-11-19 PROCEDURE — 0502F SUBSEQUENT PRENATAL CARE: CPT | Performed by: OBSTETRICS & GYNECOLOGY

## 2021-11-19 PROCEDURE — 86780 TREPONEMA PALLIDUM: CPT | Performed by: OBSTETRICS & GYNECOLOGY

## 2021-11-19 PROCEDURE — 87389 HIV-1 AG W/HIV-1&-2 AB AG IA: CPT | Performed by: OBSTETRICS & GYNECOLOGY

## 2021-11-19 PROCEDURE — 81002 URINALYSIS NONAUTO W/O SCOPE: CPT | Performed by: OBSTETRICS & GYNECOLOGY

## 2021-11-19 NOTE — PROGRESS NOTES
Seen [facility] 11-08 with UC's and exam 1 cm per Don. Occasional UC's. Discussed glucosuria and she passed GCT well. She usually has a sweet breakfast. I asked her to collect fasting urine at home for next visit.

## 2021-11-29 ENCOUNTER — TELEPHONE (OUTPATIENT)
Dept: OBGYN CLINIC | Facility: CLINIC | Age: 26
End: 2021-11-29

## 2021-11-29 NOTE — TELEPHONE ENCOUNTER
Pt 35 weekks  Pt concerned when she sits down her feet are turning purple, but they are not swelling. They do feel tight. Please advise.

## 2021-11-29 NOTE — TELEPHONE ENCOUNTER
35wd2. Pt states when she is sitting down she notes feet become purplish hue to them. Skin becomes tight. States no swelling of the lower extremities, feet do not go numb and color returns once pt is up and about walking. Pt states no issue with gate once up and walking. Pt informed that with pregnancy and extra blood volume it is not uncommon for color change to be noted when sitting for long periods of time. Pt instructed to elevate feet when sitting, and consider compression stockings. Pt also encouraged to ambulate every 2 hours to keep circulation flowing. Pt instructed to reach out is numbness, weakness or color does not return as before. Pt states understanding. To Neville Hood [phone] on-call for sign-off.  Thank you

## 2021-12-02 ENCOUNTER — HOSPITAL ENCOUNTER (OUTPATIENT)
Facility: HOSPITAL | Age: 26
Setting detail: OBSERVATION
Discharge: HOME | End: 2021-12-03
Attending: OBSTETRICS & GYNECOLOGY | Admitting: OBSTETRICS & GYNECOLOGY
Payer: MEDICAID

## 2021-12-02 ENCOUNTER — TELEPHONE (OUTPATIENT)
Dept: OBGYN CLINIC | Facility: CLINIC | Age: 26
End: 2021-12-02

## 2021-12-02 RX ORDER — NALBUPHINE HCL 10 MG/ML
10 AMPUL (ML) INJECTION ONCE AS NEEDED
Status: COMPLETED | OUTPATIENT
Start: 2021-12-02 | End: 2021-12-02

## 2021-12-02 RX ORDER — BETAMETHASONE SODIUM PHOSPHATE AND BETAMETHASONE ACETATE 3; 3 MG/ML; MG/ML
INJECTION, SUSPENSION INTRA-ARTICULAR; INTRALESIONAL; INTRAMUSCULAR; SOFT TISSUE
Status: COMPLETED
Start: 2021-12-02 | End: 2021-12-02

## 2021-12-02 RX ORDER — DEXTROSE, SODIUM CHLORIDE, SODIUM LACTATE, POTASSIUM CHLORIDE, AND CALCIUM CHLORIDE 5; .6; .31; .03; .02 G/100ML; G/100ML; G/100ML; G/100ML; G/100ML
INJECTION, SOLUTION INTRAVENOUS CONTINUOUS
Status: DISCONTINUED | OUTPATIENT
Start: 2021-12-02 | End: 2021-12-04

## 2021-12-02 RX ORDER — NIFEDIPINE 10 MG/1
20 CAPSULE ORAL EVERY 6 HOURS
Status: DISCONTINUED | OUTPATIENT
Start: 2021-12-03 | End: 2021-12-03

## 2021-12-02 RX ORDER — ACETAMINOPHEN 500 MG
1000 TABLET ORAL EVERY 6 HOURS PRN
Status: DISCONTINUED | OUTPATIENT
Start: 2021-12-02 | End: 2021-12-04

## 2021-12-02 RX ORDER — CLINDAMYCIN PHOSPHATE 900 MG/50ML
900 INJECTION INTRAVENOUS EVERY 8 HOURS
Status: DISCONTINUED | OUTPATIENT
Start: 2021-12-02 | End: 2021-12-04

## 2021-12-02 RX ORDER — DOCUSATE SODIUM 100 MG/1
100 CAPSULE, LIQUID FILLED ORAL 2 TIMES DAILY
Status: DISCONTINUED | OUTPATIENT
Start: 2021-12-02 | End: 2021-12-04

## 2021-12-02 RX ORDER — SODIUM CHLORIDE, SODIUM LACTATE, POTASSIUM CHLORIDE, CALCIUM CHLORIDE 600; 310; 30; 20 MG/100ML; MG/100ML; MG/100ML; MG/100ML
INJECTION, SOLUTION INTRAVENOUS CONTINUOUS
Status: DISCONTINUED | OUTPATIENT
Start: 2021-12-02 | End: 2021-12-04

## 2021-12-02 RX ORDER — ACETAMINOPHEN 500 MG
500 TABLET ORAL EVERY 6 HOURS PRN
Status: DISCONTINUED | OUTPATIENT
Start: 2021-12-02 | End: 2021-12-04

## 2021-12-02 RX ORDER — NIFEDIPINE 10 MG/1
10 CAPSULE ORAL
Status: COMPLETED | OUTPATIENT
Start: 2021-12-02 | End: 2021-12-02

## 2021-12-02 RX ORDER — HYDROXYZINE HYDROCHLORIDE 50 MG/ML
50 INJECTION, SOLUTION INTRAMUSCULAR ONCE AS NEEDED
Status: COMPLETED | OUTPATIENT
Start: 2021-12-02 | End: 2021-12-02

## 2021-12-02 NOTE — TELEPHONE ENCOUNTER
pt. states that she thinks that she is in labor. Pt. States that she is having contractions 2-4 minutes apart, and cramping and pt is having back pain going down to buttock.  I transferred the call to RN

## 2021-12-02 NOTE — TELEPHONE ENCOUNTER
Pt is 35w5d, c/o UCs q 2-4 min lasting 60-70 seconds for one hour. Pt states it is difficult to rate the pain but when the UCs occur she has to close her eyes and they take her breath away. Good FM, denies VB and LOF. Pt is drinking water all day. Advised she drink 2 large glasses of water and head to triage because she is . Pt agrees. Mom is there with her and will drive her to [address] [place] [address] now. JENNIFERK on call and triage aware.       To VENU On call as ANGELITO

## 2021-12-03 VITALS
HEART RATE: 118 BPM | OXYGEN SATURATION: 97 % | RESPIRATION RATE: 20 BRPM | HEIGHT: 61 IN | TEMPERATURE: 99 F | SYSTOLIC BLOOD PRESSURE: 110 MMHG | DIASTOLIC BLOOD PRESSURE: 60 MMHG | WEIGHT: 151 LBS | BODY MASS INDEX: 28.51 KG/M2

## 2021-12-03 PROBLEM — O60.03 PRETERM LABOR IN THIRD TRIMESTER: Status: ACTIVE | Noted: 2021-12-03

## 2021-12-03 PROBLEM — O60.03 PRETERM LABOR IN THIRD TRIMESTER (HCC): Status: ACTIVE | Noted: 2021-12-03

## 2021-12-03 PROCEDURE — 99217 OBSERVATION CARE DISCHARGE: CPT | Performed by: OBSTETRICS & GYNECOLOGY

## 2021-12-03 RX ORDER — BETAMETHASONE SODIUM PHOSPHATE AND BETAMETHASONE ACETATE 3; 3 MG/ML; MG/ML
12 INJECTION, SUSPENSION INTRA-ARTICULAR; INTRALESIONAL; INTRAMUSCULAR; SOFT TISSUE ONCE
Status: COMPLETED | OUTPATIENT
Start: 2021-12-03 | End: 2021-12-03

## 2021-12-03 NOTE — PAYOR COMM NOTE
--------------  ADMISSION REVIEW     Payor: Janes Knott #:  [id number]  Authorization Number: [id number]    Admit date: 12/2/21  Admit time:  9:21 PM       REVIEW DOCUMENTATION:  ED Provider Notes    No notes of th Obstetric Comments   Sexually active since age 15     Past Medical History:   Past Medical History:   Diagnosis Date   • Anemia    • Decorative tattoo    • Dysmenorrhea     1st 2 days of menses   •         •         • Migraine headache     since age 11 Range    WBC 13.2 (H) 4.0 - 11.0 x10(3) uL    RBC 4.21 3.80 - 5.30 x10(6)uL    HGB 12.7 12.0 - 16.0 g/dL    HCT 40.0 35.0 - 48.0 %    MCV 95.0 80.0 - 100.0 fL    MCH 30.2 26.0 - 34.0 pg    MCHC 31.8 31.0 - 37.0 g/dL    RDW-SD 49.9 (H) 35.1 - 46.3 fL    RDW 1,000 mL Intravenous Kye Kaur RN      Nalbuphine HCl (NUBAIN) injection 10 mg     Date Action Dose Route User    12/2/2021 2156 Given 10 mg Intramuscular (Left Leg) Jenn Ferguson RN      NIFEdipine (PROCARDIA) cap 10 mg     Date Action Dose Route U

## 2021-12-03 NOTE — H&P
[address] Patient Status:  Inpatient    10/4/1995 MRN [id number]   [address]  Attending Vinh Stacy MD   Hosp Day # 0 PCP Rudy Ruiz, APRN     Date of Admi Great-Grandmother         aneurysm     Social History: Social History    Tobacco Use      Smoking status: Never Smoker      Smokeless tobacco: Never Used    Alcohol use: Not Currently      Alcohol/week: 0.0 standard drinks      Comment: socially prior to p 0.00 - 0.70 x10(3) uL    Basophil Absolute 0.05 0.00 - 0.20 x10(3) uL    Immature Granulocyte Absolute 0.24 0.00 - 1.00 x10(3) uL    Neutrophil % 79.9 %    Lymphocyte % 9.9 %    Monocyte % 6.7 %    Eosinophil % 1.3 %    Basophil % 0.4 %    Immature Granulo

## 2021-12-03 NOTE — PLAN OF CARE
Problem: BIRTH - VAGINAL/ SECTION  Goal: Fetal and maternal status remain reassuring during the birth process  Description: INTERVENTIONS:  - Monitor vital signs  - Monitor fetal heart rate  - Monitor uterine activity  - Monitor labor progression choices  Outcome: Progressing     Problem: Patient/Family Goals  Goal: Patient/Family Long Term Goal  Description: Patient's Long Term Goal: Maintain a safe and healthy pregnancy. Interventions:  Provide safe environment and orient to surroundings.   Pro

## 2021-12-03 NOTE — PROGRESS NOTES
12/3/2021, 11:57 AM    Subjective:    Contractions spaced out.   Pt wants to be delivered    Objective:   12/03/21  0000 12/03/21  0215 12/03/21  0600 12/03/21  0850   BP:  106/65 110/69 100/64   BP Location:  Right arm Right arm    Pulse:  96 108 120   Res

## 2021-12-04 NOTE — DISCHARGE PLANNING
Patient discharged home per provider order. Patient received discharge paper work and instructions with follow up appointment. IV removed and patient discharged home with family and with all belongings.

## 2021-12-04 NOTE — PLAN OF CARE
Problem: BIRTH - VAGINAL/ SECTION  Goal: Fetal and maternal status remain reassuring during the birth process  Description: INTERVENTIONS:  - Monitor vital signs  - Monitor fetal heart rate  - Monitor uterine activity  - Monitor labor progression timely, complete, and accurate information to patient/family  - Incorporate patient and family knowledge, values, beliefs, and cultural backgrounds into the planning and delivery of care  - Encourage patient/family to participate in care and decision-nato

## 2021-12-07 ENCOUNTER — ROUTINE PRENATAL (OUTPATIENT)
Dept: OBGYN CLINIC | Facility: CLINIC | Age: 26
End: 2021-12-07
Payer: MEDICAID

## 2021-12-07 VITALS
SYSTOLIC BLOOD PRESSURE: 107 MMHG | DIASTOLIC BLOOD PRESSURE: 73 MMHG | WEIGHT: 161.38 LBS | BODY MASS INDEX: 31 KG/M2 | HEART RATE: 109 BPM

## 2021-12-07 DIAGNOSIS — Z34.02 ENCOUNTER FOR SUPERVISION OF NORMAL FIRST PREGNANCY IN SECOND TRIMESTER: Primary | ICD-10-CM

## 2021-12-07 PROCEDURE — 0502F SUBSEQUENT PRENATAL CARE: CPT | Performed by: OBSTETRICS & GYNECOLOGY

## 2021-12-07 PROCEDURE — 3074F SYST BP LT 130 MM HG: CPT | Performed by: OBSTETRICS & GYNECOLOGY

## 2021-12-07 PROCEDURE — 81002 URINALYSIS NONAUTO W/O SCOPE: CPT | Performed by: OBSTETRICS & GYNECOLOGY

## 2021-12-07 PROCEDURE — 3078F DIAST BP <80 MM HG: CPT | Performed by: OBSTETRICS & GYNECOLOGY

## 2021-12-08 ENCOUNTER — APPOINTMENT (OUTPATIENT)
Dept: ULTRASOUND IMAGING | Facility: HOSPITAL | Age: 26
End: 2021-12-08
Attending: OBSTETRICS & GYNECOLOGY
Payer: MEDICAID

## 2021-12-08 ENCOUNTER — HOSPITAL ENCOUNTER (OUTPATIENT)
Facility: HOSPITAL | Age: 26
Setting detail: OBSERVATION
Discharge: HOME | End: 2021-12-09
Attending: OBSTETRICS & GYNECOLOGY | Admitting: OBSTETRICS & GYNECOLOGY
Payer: MEDICAID

## 2021-12-08 PROCEDURE — 76819 FETAL BIOPHYS PROFIL W/O NST: CPT | Performed by: OBSTETRICS & GYNECOLOGY

## 2021-12-08 PROCEDURE — 81003 URINALYSIS AUTO W/O SCOPE: CPT | Performed by: OBSTETRICS & GYNECOLOGY

## 2021-12-08 PROCEDURE — 99214 OFFICE O/P EST MOD 30 MIN: CPT

## 2021-12-08 RX ORDER — TRISODIUM CITRATE DIHYDRATE AND CITRIC ACID MONOHYDRATE 500; 334 MG/5ML; MG/5ML
30 SOLUTION ORAL AS NEEDED
Status: DISCONTINUED | OUTPATIENT
Start: 2021-12-08 | End: 2021-12-09

## 2021-12-08 RX ORDER — DEXTROSE, SODIUM CHLORIDE, SODIUM LACTATE, POTASSIUM CHLORIDE, AND CALCIUM CHLORIDE 5; .6; .31; .03; .02 G/100ML; G/100ML; G/100ML; G/100ML; G/100ML
INJECTION, SOLUTION INTRAVENOUS CONTINUOUS
Status: DISCONTINUED | OUTPATIENT
Start: 2021-12-09 | End: 2021-12-09

## 2021-12-08 RX ORDER — AMMONIA INHALANTS 0.04 G/.3ML
0.3 INHALANT RESPIRATORY (INHALATION) AS NEEDED
Status: DISCONTINUED | OUTPATIENT
Start: 2021-12-08 | End: 2021-12-09

## 2021-12-08 RX ORDER — ACETAMINOPHEN 500 MG
500 TABLET ORAL EVERY 6 HOURS PRN
Status: DISCONTINUED | OUTPATIENT
Start: 2021-12-08 | End: 2021-12-09

## 2021-12-08 RX ORDER — TERBUTALINE SULFATE 1 MG/ML
0.25 INJECTION, SOLUTION SUBCUTANEOUS AS NEEDED
Status: DISCONTINUED | OUTPATIENT
Start: 2021-12-08 | End: 2021-12-09

## 2021-12-08 RX ORDER — ONDANSETRON 2 MG/ML
4 INJECTION INTRAMUSCULAR; INTRAVENOUS EVERY 6 HOURS PRN
Status: DISCONTINUED | OUTPATIENT
Start: 2021-12-08 | End: 2021-12-09

## 2021-12-08 RX ORDER — SODIUM CHLORIDE, SODIUM LACTATE, POTASSIUM CHLORIDE, CALCIUM CHLORIDE 600; 310; 30; 20 MG/100ML; MG/100ML; MG/100ML; MG/100ML
INJECTION, SOLUTION INTRAVENOUS CONTINUOUS
Status: DISCONTINUED | OUTPATIENT
Start: 2021-12-09 | End: 2021-12-09

## 2021-12-09 ENCOUNTER — HOSPITAL ENCOUNTER (INPATIENT)
Dept: PERINATAL CARE | Facility: HOSPITAL | Age: 26
Discharge: HOME | End: 2021-12-09
Attending: OBSTETRICS & GYNECOLOGY
Payer: MEDICAID

## 2021-12-09 VITALS
SYSTOLIC BLOOD PRESSURE: 106 MMHG | TEMPERATURE: 99 F | RESPIRATION RATE: 16 BRPM | DIASTOLIC BLOOD PRESSURE: 68 MMHG | HEART RATE: 96 BPM

## 2021-12-09 PROCEDURE — 85025 COMPLETE CBC W/AUTO DIFF WBC: CPT | Performed by: OBSTETRICS & GYNECOLOGY

## 2021-12-09 PROCEDURE — 59025 FETAL NON-STRESS TEST: CPT

## 2021-12-09 PROCEDURE — 86900 BLOOD TYPING SEROLOGIC ABO: CPT | Performed by: OBSTETRICS & GYNECOLOGY

## 2021-12-09 PROCEDURE — 86901 BLOOD TYPING SEROLOGIC RH(D): CPT | Performed by: OBSTETRICS & GYNECOLOGY

## 2021-12-09 PROCEDURE — 96365 THER/PROPH/DIAG IV INF INIT: CPT

## 2021-12-09 PROCEDURE — 86850 RBC ANTIBODY SCREEN: CPT | Performed by: OBSTETRICS & GYNECOLOGY

## 2021-12-09 PROCEDURE — 96361 HYDRATE IV INFUSION ADD-ON: CPT

## 2021-12-09 PROCEDURE — 36415 COLL VENOUS BLD VENIPUNCTURE: CPT

## 2021-12-09 PROCEDURE — 76819 FETAL BIOPHYS PROFIL W/O NST: CPT | Performed by: OBSTETRICS & GYNECOLOGY

## 2021-12-09 NOTE — PROGRESS NOTES
Mom at visit. Was in [facility] on the 2nd with PTL and significant cervical change to 3 cm. Had steroids x 2. GBS not done and just placed on antibiotics in case of full labor. GBS today. Cervix without change but she continues to contract.  Labor instructions re

## 2021-12-09 NOTE — CONSULTS
[facility]  Maternal-Fetal Medicine Inpatient Consultation    Date of Admission:  12/8/2021  Date of Consult:  12/9/2021    Reason for Consult:   Suspected oligohydramnios    History of Present Illness:  Nuvia Ricardo is a a(n) 27year old breast ca   • Other (Other) Maternal Great-Grandmother         aneurysm      reports that she has never smoked. She has never used smokeless tobacco. She reports previous alcohol use. She reports that she does not use drugs.     Allergies:    Amoxici noted.     [place] -irregular contractions      Laboratory Data:  Lab Results   Component Value Date    WBC 12.7 12/09/2021    HGB 11.2 12/09/2021    HCT 34.9 12/09/2021    .0 12/09/2021       Fetal Ultrasound:  Limited and BPP    Ultrasound Findings:

## 2021-12-09 NOTE — PROGRESS NOTES
Called by Dr Kate Byrne. BPP 8/8 with normal JOEY. Okay for DC to home. Orders placed. F/u at routine appt. GBS in process.

## 2021-12-09 NOTE — DISCHARGE SUMMARY
[facility]    Discharge Summary    Daylene Amis Patient Status:  Inpatient    10/4/1995 MRN [id number]   Location [address] Attending Martín Anderson MD   Hosp Day # 0         [facility]: Estimated Date of Del

## 2021-12-09 NOTE — PROGRESS NOTES
Pt is a 27year old female admitted to TR4/TR4-A. Patient presents with:   Labor: every 3-4 minutes since this morning     Pt is  36w4d intra-uterine pregnancy. History obtained, consents signed. Oriented to room, staff, and plan of care.

## 2021-12-09 NOTE — H&P
[address] Patient Status:  Inpatient    10/4/1995 MRN [id number]   Location [address] [facility] Attending Keyon Kaur MD   Hosp Day # 0 PCP Arun Patiño, APRN     Date of drinks      Comment: socially prior to pregnancy     Family History:  No pertinent family history  Allergies/Medications:    Allergies:     Amoxicillin             RASH, HIVES  Medications:  Ferrous Sulfate (IRON SLOW RELEASE OR), Take by mouth., Disp: , Rf Colette Nicolas MD on 12/09/2021 at 6:17 AM     Finalized by (CST): Antoine Lucero MD on 12/09/2021 at 6:21 AM                 Assessment/Plan:     Pregnancy  IUP@ 36 5/7 weeks    oligohydramnios     Not in labor.     Treatment Plan:  Intervention: MFM consult

## 2021-12-09 NOTE — PAYOR COMM NOTE
--------------  ADMISSION REVIEW     Payor: Janes Knott #:  [id number]  Authorization Number: [id number]    Admit date: 12/9/21  Admit time: 12:37 AM            H&P - H&P Note      H&P signed by Aury Irizarry MD • Anemia    • Decorative tattoo    • Dysmenorrhea     1st 2 days of menses   • History of chlamydia     10-19yo   • History of gonorrhea     [age]yo   • Migraine headache     since age 11   • UTI (urinary tract infection)      Past Surgerical History: His 30.2 29.9   MCHC 31.8 32.1   RDW 14.4 14.7   NEPRELIM 10.54* 9.38*   WBC 13.2* 12.7*   .0 188.0          US PREG BIOPHYSICAL WO STRESS TEST (HBI=[id number])    Result Date: 12/9/2021  CONCLUSION:   Single live intrauterine gestation in cephalic presentat yesterday morning. She weeded most of the day and even tried a warm shower to see if she can get the contractions to dru. She came in late last night and was having some contractions that were 5 to 8 minutes apart.   The fetal heart rate tracing was bas HIVES     Medications:     Current Facility-Administered Medications:   •  acetaminophen (TYLENOL EXTRA STRENGTH) tab 500 mg, 500 mg, Oral, Q6H PRN  •  Terbutaline Sulfate (BRETHINE) 1 MG/ML injection 0.25 mg, 0.25 mg, Subcutaneous, PRN  •  sodium citrate- in cephalic presentation. Placenta is posterior. A 3 vessel cord is noted. Cardiac activity is present at 162 bpm  MVP is 4.7 cm . JOEY 14.8 cm  BPP is 8/8.      Impression:  Single IUP in cephalic presentation.   Posterior placenta with a 3 vessel cor

## 2021-12-09 NOTE — PAYOR COMM NOTE
--------------  DISCHARGE REVIEW    Payor: Janes Knott #:  [id number]  Authorization Number: [id number]    Admit date: N/A  Admit time:  N/A  Discharge Date: 12/3/2021 10:50 PM    Observation stay     Admitting Yoel tolerated    Discharge Activity: As tolerated    Follow up:           Estrellita Burgos.  MD Livier  12/3/2021    Electronically signed by Isidra Cole MD on 12/3/2021 10:34 PM         REVIEWER COMMENTS    Observation stay

## 2021-12-10 NOTE — PAYOR COMM NOTE
--------------  DISCHARGE REVIEW    Payor: Janes Knott #:  [id number]  Authorization Number: [id number]    Admit date: 12/9/21  Admit time:  12:37 AM  Discharge Date: 12/9/2021  1:46 PM     Admitting Physician: Lexii Man 12/9/2021 12:59 PM         REVIEWER COMMENTS

## 2021-12-15 ENCOUNTER — ROUTINE PRENATAL (OUTPATIENT)
Dept: OBGYN CLINIC | Facility: CLINIC | Age: 26
End: 2021-12-15
Payer: MEDICAID

## 2021-12-15 ENCOUNTER — TELEPHONE (OUTPATIENT)
Dept: OBGYN CLINIC | Facility: CLINIC | Age: 26
End: 2021-12-15

## 2021-12-15 VITALS
SYSTOLIC BLOOD PRESSURE: 110 MMHG | BODY MASS INDEX: 30 KG/M2 | HEART RATE: 116 BPM | DIASTOLIC BLOOD PRESSURE: 77 MMHG | WEIGHT: 156.19 LBS

## 2021-12-15 DIAGNOSIS — Z11.52 ENCOUNTER FOR SCREENING FOR COVID-19: Primary | ICD-10-CM

## 2021-12-15 DIAGNOSIS — Z34.03 ENCOUNTER FOR SUPERVISION OF NORMAL FIRST PREGNANCY IN THIRD TRIMESTER: Primary | ICD-10-CM

## 2021-12-15 PROCEDURE — 3074F SYST BP LT 130 MM HG: CPT | Performed by: OBSTETRICS & GYNECOLOGY

## 2021-12-15 PROCEDURE — 3078F DIAST BP <80 MM HG: CPT | Performed by: OBSTETRICS & GYNECOLOGY

## 2021-12-15 PROCEDURE — 0502F SUBSEQUENT PRENATAL CARE: CPT | Performed by: OBSTETRICS & GYNECOLOGY

## 2021-12-15 PROCEDURE — 81002 URINALYSIS NONAUTO W/O SCOPE: CPT | Performed by: OBSTETRICS & GYNECOLOGY

## 2021-12-15 NOTE — TELEPHONE ENCOUNTER
Pt calling in to get infor on induction. Met with Kelly today (12/15) stating someone should be reaching out to pt about this.     Please advise

## 2021-12-15 NOTE — TELEPHONE ENCOUNTER
IOL scheduled at [place] on 12/29. Covid test ordered. Pt advised of induction time and advised to get covid test done 3 days before.   Pt instructed to quarantine after taking covid test.

## 2021-12-18 ENCOUNTER — HOSPITAL ENCOUNTER (OUTPATIENT)
Facility: HOSPITAL | Age: 26
Setting detail: OBSERVATION
Discharge: HOME | End: 2021-12-18
Attending: OBSTETRICS & GYNECOLOGY | Admitting: OBSTETRICS & GYNECOLOGY
Payer: MEDICAID

## 2021-12-18 ENCOUNTER — MOBILE ENCOUNTER (OUTPATIENT)
Dept: OBGYN CLINIC | Facility: CLINIC | Age: 26
End: 2021-12-18

## 2021-12-18 VITALS
TEMPERATURE: 98 F | RESPIRATION RATE: 16 BRPM | HEART RATE: 108 BPM | DIASTOLIC BLOOD PRESSURE: 70 MMHG | SYSTOLIC BLOOD PRESSURE: 111 MMHG

## 2021-12-18 PROCEDURE — 59025 FETAL NON-STRESS TEST: CPT | Performed by: OBSTETRICS & GYNECOLOGY

## 2021-12-19 ENCOUNTER — ANESTHESIA (OUTPATIENT)
Dept: OBGYN UNIT | Facility: HOSPITAL | Age: 26
End: 2021-12-19
Payer: MEDICAID

## 2021-12-19 ENCOUNTER — ANESTHESIA EVENT (OUTPATIENT)
Dept: OBGYN UNIT | Facility: HOSPITAL | Age: 26
End: 2021-12-19
Payer: MEDICAID

## 2021-12-19 ENCOUNTER — TELEPHONE (OUTPATIENT)
Dept: OBGYN CLINIC | Facility: CLINIC | Age: 26
End: 2021-12-19

## 2021-12-19 ENCOUNTER — HOSPITAL ENCOUNTER (INPATIENT)
Facility: HOSPITAL | Age: 26
LOS: 2 days | Discharge: HOME | End: 2021-12-21
Attending: OBSTETRICS & GYNECOLOGY | Admitting: OBSTETRICS & GYNECOLOGY
Payer: MEDICAID

## 2021-12-19 PROBLEM — O47.9 IRREGULAR CONTRACTIONS (HCC): Status: ACTIVE | Noted: 2021-12-19

## 2021-12-19 PROBLEM — Z37.9 NORMAL LABOR: Status: ACTIVE | Noted: 2021-12-19

## 2021-12-19 PROBLEM — Z37.9 NORMAL LABOR (HCC): Status: ACTIVE | Noted: 2021-12-19

## 2021-12-19 PROBLEM — O47.9 IRREGULAR CONTRACTIONS: Status: ACTIVE | Noted: 2021-12-19

## 2021-12-19 PROCEDURE — 59409 OBSTETRICAL CARE: CPT | Performed by: OBSTETRICS & GYNECOLOGY

## 2021-12-19 PROCEDURE — 10907ZC DRAINAGE OF AMNIOTIC FLUID, THERAPEUTIC FROM PRODUCTS OF CONCEPTION, VIA NATURAL OR ARTIFICIAL OPENING: ICD-10-PCS | Performed by: OBSTETRICS & GYNECOLOGY

## 2021-12-19 RX ORDER — BUPIVACAINE HYDROCHLORIDE 2.5 MG/ML
20 INJECTION, SOLUTION EPIDURAL; INFILTRATION; INTRACAUDAL ONCE
Status: DISCONTINUED | OUTPATIENT
Start: 2021-12-19 | End: 2021-12-19 | Stop reason: HOSPADM

## 2021-12-19 RX ORDER — TRISODIUM CITRATE DIHYDRATE AND CITRIC ACID MONOHYDRATE 500; 334 MG/5ML; MG/5ML
30 SOLUTION ORAL AS NEEDED
Status: DISCONTINUED | OUTPATIENT
Start: 2021-12-19 | End: 2021-12-19 | Stop reason: HOSPADM

## 2021-12-19 RX ORDER — NALBUPHINE HCL 10 MG/ML
2.5 AMPUL (ML) INJECTION
Status: DISCONTINUED | OUTPATIENT
Start: 2021-12-19 | End: 2021-12-19

## 2021-12-19 RX ORDER — IBUPROFEN 600 MG/1
600 TABLET ORAL EVERY 6 HOURS PRN
Status: DISCONTINUED | OUTPATIENT
Start: 2021-12-19 | End: 2021-12-19 | Stop reason: HOSPADM

## 2021-12-19 RX ORDER — ACETAMINOPHEN 500 MG
500 TABLET ORAL EVERY 6 HOURS PRN
Status: DISCONTINUED | OUTPATIENT
Start: 2021-12-19 | End: 2021-12-19 | Stop reason: HOSPADM

## 2021-12-19 RX ORDER — ACETAMINOPHEN 325 MG/1
650 TABLET ORAL EVERY 6 HOURS PRN
Status: DISCONTINUED | OUTPATIENT
Start: 2021-12-19 | End: 2021-12-21

## 2021-12-19 RX ORDER — AMMONIA INHALANTS 0.04 G/.3ML
0.3 INHALANT RESPIRATORY (INHALATION) AS NEEDED
Status: DISCONTINUED | OUTPATIENT
Start: 2021-12-19 | End: 2021-12-21

## 2021-12-19 RX ORDER — DIAPER,BRIEF,INFANT-TODD,DISP
1 EACH MISCELLANEOUS EVERY 6 HOURS PRN
Status: DISCONTINUED | OUTPATIENT
Start: 2021-12-19 | End: 2021-12-21

## 2021-12-19 RX ORDER — TERBUTALINE SULFATE 1 MG/ML
0.25 INJECTION, SOLUTION SUBCUTANEOUS AS NEEDED
Status: DISCONTINUED | OUTPATIENT
Start: 2021-12-19 | End: 2021-12-19 | Stop reason: HOSPADM

## 2021-12-19 RX ORDER — IBUPROFEN 600 MG/1
600 TABLET ORAL EVERY 6 HOURS
Status: DISCONTINUED | OUTPATIENT
Start: 2021-12-19 | End: 2021-12-21

## 2021-12-19 RX ORDER — LIDOCAINE HYDROCHLORIDE 10 MG/ML
30 INJECTION, SOLUTION EPIDURAL; INFILTRATION; INTRACAUDAL; PERINEURAL ONCE
Status: DISCONTINUED | OUTPATIENT
Start: 2021-12-19 | End: 2021-12-19 | Stop reason: HOSPADM

## 2021-12-19 RX ORDER — BUPIVACAINE HCL/0.9 % NACL/PF 0.25 %
5 PLASTIC BAG, INJECTION (ML) EPIDURAL AS NEEDED
Status: DISCONTINUED | OUTPATIENT
Start: 2021-12-19 | End: 2021-12-19

## 2021-12-19 RX ORDER — CHOLECALCIFEROL (VITAMIN D3) 25 MCG
1 TABLET,CHEWABLE ORAL DAILY
Status: DISCONTINUED | OUTPATIENT
Start: 2021-12-19 | End: 2021-12-21

## 2021-12-19 RX ORDER — DEXTROSE, SODIUM CHLORIDE, SODIUM LACTATE, POTASSIUM CHLORIDE, AND CALCIUM CHLORIDE 5; .6; .31; .03; .02 G/100ML; G/100ML; G/100ML; G/100ML; G/100ML
INJECTION, SOLUTION INTRAVENOUS CONTINUOUS
Status: DISCONTINUED | OUTPATIENT
Start: 2021-12-19 | End: 2021-12-19 | Stop reason: HOSPADM

## 2021-12-19 RX ORDER — BUPIVACAINE HYDROCHLORIDE 2.5 MG/ML
INJECTION, SOLUTION EPIDURAL; INFILTRATION; INTRACAUDAL
Status: COMPLETED | OUTPATIENT
Start: 2021-12-19 | End: 2021-12-19

## 2021-12-19 RX ORDER — SODIUM CHLORIDE, SODIUM LACTATE, POTASSIUM CHLORIDE, CALCIUM CHLORIDE 600; 310; 30; 20 MG/100ML; MG/100ML; MG/100ML; MG/100ML
INJECTION, SOLUTION INTRAVENOUS AS NEEDED
Status: DISCONTINUED | OUTPATIENT
Start: 2021-12-19 | End: 2021-12-19 | Stop reason: HOSPADM

## 2021-12-19 RX ORDER — DOCUSATE SODIUM 100 MG/1
100 CAPSULE, LIQUID FILLED ORAL
Status: DISCONTINUED | OUTPATIENT
Start: 2021-12-19 | End: 2021-12-21

## 2021-12-19 RX ORDER — ONDANSETRON 2 MG/ML
4 INJECTION INTRAMUSCULAR; INTRAVENOUS EVERY 6 HOURS PRN
Status: DISCONTINUED | OUTPATIENT
Start: 2021-12-19 | End: 2021-12-21

## 2021-12-19 RX ORDER — ONDANSETRON 2 MG/ML
4 INJECTION INTRAMUSCULAR; INTRAVENOUS EVERY 6 HOURS PRN
Status: DISCONTINUED | OUTPATIENT
Start: 2021-12-19 | End: 2021-12-19 | Stop reason: HOSPADM

## 2021-12-19 RX ORDER — BISACODYL 10 MG
10 SUPPOSITORY, RECTAL RECTAL ONCE AS NEEDED
Status: DISCONTINUED | OUTPATIENT
Start: 2021-12-19 | End: 2021-12-21

## 2021-12-19 RX ORDER — LIDOCAINE HYDROCHLORIDE 10 MG/ML
INJECTION, SOLUTION INFILTRATION; PERINEURAL
Status: COMPLETED | OUTPATIENT
Start: 2021-12-19 | End: 2021-12-19

## 2021-12-19 RX ORDER — LIDOCAINE HYDROCHLORIDE AND EPINEPHRINE 15; 5 MG/ML; UG/ML
INJECTION, SOLUTION EPIDURAL
Status: COMPLETED | OUTPATIENT
Start: 2021-12-19 | End: 2021-12-19

## 2021-12-19 RX ORDER — AMMONIA INHALANTS 0.04 G/.3ML
0.3 INHALANT RESPIRATORY (INHALATION) AS NEEDED
Status: DISCONTINUED | OUTPATIENT
Start: 2021-12-19 | End: 2021-12-19 | Stop reason: HOSPADM

## 2021-12-19 RX ORDER — SIMETHICONE 80 MG
80 TABLET,CHEWABLE ORAL 3 TIMES DAILY PRN
Status: DISCONTINUED | OUTPATIENT
Start: 2021-12-19 | End: 2021-12-21

## 2021-12-19 RX ADMIN — BUPIVACAINE HYDROCHLORIDE 5 ML: 2.5 INJECTION, SOLUTION EPIDURAL; INFILTRATION; INTRACAUDAL at 09:20:00

## 2021-12-19 RX ADMIN — LIDOCAINE HYDROCHLORIDE AND EPINEPHRINE 5 ML: 15; 5 INJECTION, SOLUTION EPIDURAL at 09:20:00

## 2021-12-19 RX ADMIN — LIDOCAINE HYDROCHLORIDE 5 ML: 10 INJECTION, SOLUTION INFILTRATION; PERINEURAL at 09:20:00

## 2021-12-19 NOTE — L&D DELIVERY NOTE
[facility]    Vaginal Delivery Note    Eriberto Moment Patient Status:  Inpatient    10/4/1995 MRN [id number]   Location [address] Attending Gina Muniz, [address] [facility] Day # 0 PCP MARY Olguin

## 2021-12-19 NOTE — PROGRESS NOTES
Pt is a 27year old female admitted to TR1/TR1-A. No chief complaint on file. Pt is  38w0d intra-uterine pregnancy. History obtained, consents signed. Oriented to room, staff, and plan of care.

## 2021-12-19 NOTE — ANESTHESIA PREPROCEDURE EVALUATION
Anesthesia PreOp Note    HPI:     Natalie Lawton is a 27year old female who presents for preoperative consultation requested by: * No surgeons listed *    Date of Surgery: 12/19/2021    * No procedures listed *  Indication: * No pre-op diagnosis entered citrate-citric acid (BICITRA) solution 30 mL, 30 mL, Oral, PRN, Lexine Hamper, DO  ondansetron [facility]) injection 4 mg, 4 mg, Intravenous, Q6H PRN, Lexine Hamper, DO  ammonia aromatic (ammonia) nasal solution 0.3 mL, 0.3 mL, Nasal, PRN, Cassie Pate use: Not Currently        Alcohol/week: 0.0 standard drinks        Comment: socially prior to pregnancy      Drug use: Never      Sexual activity: Yes        Partners: Male        Birth control/protection: Condom        Comment: lifetime partners 15    Elena Graham FB  Neck ROM: full  Dental      Pulmonary - negative ROS and normal exam   Cardiovascular - negative ROS and normal exam    Neuro/Psych - negative ROS     GI/Hepatic/Renal - negative ROS     Endo/Other - negative ROS   Abdominal  - normal exam

## 2021-12-19 NOTE — PROGRESS NOTES
Pt is a 27year old female admitted to TR1/TR1-A. Patient presents with:  R/o Labor: contractions every 2 minutes     Pt is  38w1d intra-uterine pregnancy. History obtained, consents signed. Oriented to room, staff, and plan of care.

## 2021-12-19 NOTE — PROGRESS NOTES
Late entry    This RN assisted with admission of this patient. Pt admitted from triage for labor to room LD 4. Pt is a 27 yr old  and is 38+1 weeks gestation today. Pt is 8.5/100/-2. T.O. received from Dr. Tadeo Rivera by SALLY Figueroa, triage RN.  Dr. Sacha Arredondo

## 2021-12-19 NOTE — DISCHARGE SUMMARY
[facility]    Discharge Summary    Earl Block Patient Status:  Inpatient    10/4/1995 MRN [id number]   Location [address] Attending Dayana Hollingsworth, [address] [facility] Day # 2       Delivering OB Clinician:

## 2021-12-19 NOTE — ANESTHESIA POSTPROCEDURE EVALUATION
Patient: Teresita Godoy    Procedure Summary     Date: 12/19/21 Room / Location:     Anesthesia Start: 0915 Anesthesia Stop: 1699    Procedure: LABOR ANALGESIA Diagnosis:     Scheduled Providers:  Anesthesiologist: Hermila Nino MD    Anesthesia Type:

## 2021-12-19 NOTE — PROGRESS NOTES
On-call: 38-week primip paged the service complaining of uterine contractions. She states it started at 3 PM.  She states for the last hour they are every 3-1/2 minutes and lasting for 90 seconds. Patient states that she is getting uncomfortable. No vaginal bleeding or leakage of fluid. Good fetal movement . Directed to the birth center for evaluation.

## 2021-12-19 NOTE — PROGRESS NOTES
Patient up to bathroom with assist x 2. Voided Unable to void at this time. Bladder scanner read 484+. Patient was straight cath and RN got 1000mL of urine return.  Patient transferred to mother/baby room 357 per wheelchair in stable condition with baby an

## 2021-12-19 NOTE — H&P
[address] Patient Status:  Inpatient    10/4/1995 MRN [id number]   Location [address] [facility] Attending Manuel Aleman, [address] [facility] [address] Day # 0 PCP Maranda Horner, APRN     Date • UTI (urinary tract infection)      Past Social History: History reviewed. No pertinent surgical history.   Family History:   Family History   Problem Relation Age of Onset   • Other (Other) Father         overdose   • Other (Other) Brother         asthm 12.3 12.0 - 16.0 g/dL    HCT 38.6 35.0 - 48.0 %    MCV 94.8 80.0 - 100.0 fL    MCH 30.2 26.0 - 34.0 pg    MCHC 31.9 31.0 - 37.0 g/dL    RDW-SD 51.0 (H) 35.1 - 46.3 fL    RDW 14.8 11.0 - 15.0 %    .0 150.0 - 450.0 10(3)uL    Neutrophil Absolute Preli

## 2021-12-19 NOTE — PROGRESS NOTES
Feeling pressure  10/100/+2  Fht: cat 1  [place]: q4m    Start 2nd stage  Titrate pitocin to UCs q2-3m  Anticipate

## 2021-12-19 NOTE — ANESTHESIA PROCEDURE NOTES
Labor Analgesia    Date/Time: 12/19/2021 9:20 AM  Performed by: Andreia Corrales MD  Authorized by: Andreia Corrales MD       General Information and Staff    Start Time:  12/19/2021 9:15 AM  End Time:  12/19/2021 9:25 AM  Anesthesiologist:  Andreia Corrales

## 2021-12-19 NOTE — TRIAGE
[facility]      Triage Note    Issac Arango Patient Status:  Observation    10/4/1995 MRN [id number]   Location [address] Attending Nasir Boland, [address] [facility] Day # 0 MARY Mccray TempSrc: Oral       NST  Variability: Moderate           Accelerations: No           Decelerations: None            Baseline: 150 BPM           Uterine Irritability: No           Contractions: Irregular

## 2021-12-20 PROBLEM — O99.891 BACK PAIN AFFECTING PREGNANCY IN THIRD TRIMESTER: Status: ACTIVE | Noted: 2021-12-20

## 2021-12-20 PROBLEM — M54.9 BACK PAIN AFFECTING PREGNANCY IN THIRD TRIMESTER (HCC): Status: ACTIVE | Noted: 2021-12-20

## 2021-12-20 PROBLEM — M54.9 BACK PAIN AFFECTING PREGNANCY IN THIRD TRIMESTER: Status: ACTIVE | Noted: 2021-12-20

## 2021-12-20 PROBLEM — O99.891 BACK PAIN AFFECTING PREGNANCY IN THIRD TRIMESTER (HCC): Status: ACTIVE | Noted: 2021-12-20

## 2021-12-20 NOTE — LACTATION NOTE
LACTATION NOTE - MOTHER      Evaluation Type: Inpatient    Problems identified  Problems identified: Knowledge deficit;Milk supply not WNL  Milk supply not WNL: Reduced (potential)              Maternal Assessment  Prior breastfeeding experience (comment b

## 2021-12-20 NOTE — PROGRESS NOTES
[facility]    OB/Gyne Post  Progress Note      Adelita Galan Patient Status:  Inpatient    10/4/1995 MRN [id number]   Location [facility] 3SE Attending Zack Goldberg, [address] [facility] Day # 1 PCP MARY Trejo       Sub uL    Lymphocyte Absolute 1.63 1.00 - 4.00 x10(3) uL    Monocyte Absolute 1.11 (H) 0.10 - 1.00 x10(3) uL    Eosinophil Absolute 0.07 0.00 - 0.70 x10(3) uL    Basophil Absolute 0.05 0.00 - 0.20 x10(3) uL    Immature Granulocyte Absolute 0.17 0.00 - 1.00 x10 patient expressed understanding, denied questions, and wishes to proceed with the procedure for her son. Awaiting clearance from PEDS.        Ana Swain DO  12/20/2021  7:31 AM

## 2021-12-20 NOTE — PROGRESS NOTES
Mom at visit. Don with markedly worsening back pain and she is quite dysfunctional. She continues to contract daily. I would offer IOL after 39 weeks for the pain but warned she could get bumped by a medical IOL. We'll try for 12-29.

## 2021-12-20 NOTE — LACTATION NOTE
This note was copied from a baby's chart.   LACTATION NOTE - INFANT    Evaluation Type  Evaluation Type: Inpatient    Problems & Assessment  Problems: comment/detail: mom choosing to exclusively pump  Infant Assessment: Minimal hunger cues present;Skin colo

## 2021-12-20 NOTE — PLAN OF CARE
Problem: Patient Centered Care  Goal: Patient preferences are identified and integrated in the patient's plan of care  Description: Interventions:  - What would you like us to know as we care for you?   - Provide timely, complete, and accurate informatio ambulation and provide assistance as needed. - Assess and monitor emotional status and provide social service/psych resources as needed. - Utilize standard precautions and use personal protective equipment as indicated.  Ensure aseptic care of all intrave supply with medication/procedure interruptions  Description: INTERVENTIONS:  - Review techniques for milk expression (breast pumping). - Provide pumping equipment/supplies, instructions, and assistance until it is safe to breastfeed infant.   Outcome: Pro

## 2021-12-21 VITALS
TEMPERATURE: 98 F | HEIGHT: 61 IN | DIASTOLIC BLOOD PRESSURE: 65 MMHG | BODY MASS INDEX: 29.45 KG/M2 | SYSTOLIC BLOOD PRESSURE: 111 MMHG | OXYGEN SATURATION: 100 % | RESPIRATION RATE: 16 BRPM | WEIGHT: 156 LBS | HEART RATE: 93 BPM

## 2021-12-21 RX ORDER — IBUPROFEN 600 MG/1
600 TABLET ORAL EVERY 6 HOURS
Qty: 15 TABLET | Refills: 0 | Status: SHIPPED | OUTPATIENT
Start: 2021-12-21

## 2021-12-28 ENCOUNTER — MED REC SCAN ONLY (OUTPATIENT)
Dept: OBGYN CLINIC | Facility: CLINIC | Age: 26
End: 2021-12-28

## 2021-12-28 ENCOUNTER — TELEPHONE (OUTPATIENT)
Dept: OBGYN CLINIC | Facility: CLINIC | Age: 26
End: 2021-12-28

## 2021-12-28 DIAGNOSIS — R30.9 PAINFUL URINATION: Primary | ICD-10-CM

## 2021-12-28 NOTE — TELEPHONE ENCOUNTER
Where is the pain? Her stitches, her urethra? I need more info. Is she staying well hydrated? Is her stool firm/hard? Is she taking stool softners.   You need to investigate this more

## 2021-12-28 NOTE — TELEPHONE ENCOUNTER
Postpartum pt has pain when she urinates and rectal pain. She had a  with TIM 12-19-21. She states she has light bleeding that is red and pink. Changes a \"disposable diaper\" 4 times a day. Denies cramping.     Denies cramping, urgency, frequency,

## 2021-12-29 ENCOUNTER — LAB ENCOUNTER (OUTPATIENT)
Dept: LAB | Age: 26
End: 2021-12-29
Attending: OBSTETRICS & GYNECOLOGY
Payer: MEDICAID

## 2021-12-29 PROCEDURE — 81001 URINALYSIS AUTO W/SCOPE: CPT | Performed by: OBSTETRICS & GYNECOLOGY

## 2021-12-29 PROCEDURE — 87086 URINE CULTURE/COLONY COUNT: CPT | Performed by: OBSTETRICS & GYNECOLOGY

## 2021-12-29 NOTE — TELEPHONE ENCOUNTER
Pt states that she has pain when she urinates. She states the discomfort is above the pubic area and a stabbing pain. She states it stops when she done urinating. Painful urination is a 7 out of 10. Pt is well hydrated and drinking 64 oz of water.     P

## 2021-12-30 RX ORDER — CEPHALEXIN 500 MG/1
500 CAPSULE ORAL 4 TIMES DAILY
Qty: 28 CAPSULE | Refills: 0 | Status: SHIPPED | OUTPATIENT
Start: 2021-12-30 | End: 2022-01-06

## 2021-12-30 NOTE — TELEPHONE ENCOUNTER
----- Message from Virgil Dec, DO sent at 12/30/2021  4:36 AM CST -----  Concern for UTI.  Have lab run culture and start keflex 500mg qid x 7d

## 2021-12-30 NOTE — TELEPHONE ENCOUNTER
Patient notified of results and recs to start Keflex. If change needed following culture results, we will let her know. Patient verbalized understanding.

## 2022-01-13 ENCOUNTER — TELEPHONE (OUTPATIENT)
Dept: OBGYN UNIT | Facility: HOSPITAL | Age: 27
End: 2022-01-13

## 2022-01-15 ENCOUNTER — HOSPITAL ENCOUNTER (OUTPATIENT)
Age: 27
Discharge: HOME | End: 2022-01-15
Attending: EMERGENCY MEDICINE
Payer: MEDICAID

## 2022-01-15 VITALS
RESPIRATION RATE: 20 BRPM | WEIGHT: 140 LBS | HEIGHT: 61 IN | DIASTOLIC BLOOD PRESSURE: 67 MMHG | SYSTOLIC BLOOD PRESSURE: 109 MMHG | HEART RATE: 110 BPM | OXYGEN SATURATION: 100 % | TEMPERATURE: 98 F | BODY MASS INDEX: 26.43 KG/M2

## 2022-01-15 DIAGNOSIS — N61.0 MASTITIS: Primary | ICD-10-CM

## 2022-01-15 PROCEDURE — 99213 OFFICE O/P EST LOW 20 MIN: CPT

## 2022-01-15 RX ORDER — CEPHALEXIN 500 MG/1
500 CAPSULE ORAL 3 TIMES DAILY
Qty: 21 CAPSULE | Refills: 0 | Status: SHIPPED | OUTPATIENT
Start: 2022-01-15 | End: 2022-01-22

## 2022-01-15 NOTE — ED PROVIDER NOTES
Patient Seen in: [facility]      History   Patient presents with:  Breast Problem    Stated Complaint: Left breast pain uses pump for newwborn    Subjective:   HPI    29-year-old female complaining of left breast swelling.   The patient is tender. ED Course   Labs Reviewed - No data to display                MDM      Patient has a mastitis advised to continue the pump she was given Keflex vies return any problems.                               Disposition and Plan     Clinical Impression:

## 2022-01-31 ENCOUNTER — POSTPARTUM (OUTPATIENT)
Dept: OBGYN CLINIC | Facility: CLINIC | Age: 27
End: 2022-01-31
Payer: MEDICAID

## 2022-01-31 VITALS
SYSTOLIC BLOOD PRESSURE: 108 MMHG | HEART RATE: 88 BPM | WEIGHT: 139 LBS | DIASTOLIC BLOOD PRESSURE: 70 MMHG | BODY MASS INDEX: 26 KG/M2

## 2022-01-31 PROCEDURE — 0502F SUBSEQUENT PRENATAL CARE: CPT | Performed by: OBSTETRICS & GYNECOLOGY

## 2022-01-31 PROCEDURE — 3078F DIAST BP <80 MM HG: CPT | Performed by: OBSTETRICS & GYNECOLOGY

## 2022-01-31 PROCEDURE — 3074F SYST BP LT 130 MM HG: CPT | Performed by: OBSTETRICS & GYNECOLOGY

## 2022-01-31 NOTE — H&P
HPI    [place] is a 27year old female  here for 6 week post-partum visit. Patient delivered a  male infant on 21 via . Patient desires orthoevra patch for contraception. Patient is formula feeding.   Had mastitis and stopped BF reported), Disp: , Rfl:   •  Misc. Devices (BREAST PUMP) Does not apply Misc, Double electric breast pump    (Patient not taking: No sig reported), Disp: 1 each, Rfl: 0  •  Misc.  Devices (BREAST PUMP) Does not apply Misc, Double Electric Breast Pump (

## 2022-06-20 ENCOUNTER — HOSPITAL ENCOUNTER (OUTPATIENT)
Age: 27
Discharge: HOME | End: 2022-06-20
Payer: MEDICAID

## 2022-06-20 VITALS
HEART RATE: 90 BPM | RESPIRATION RATE: 16 BRPM | WEIGHT: 140 LBS | BODY MASS INDEX: 26.43 KG/M2 | DIASTOLIC BLOOD PRESSURE: 70 MMHG | SYSTOLIC BLOOD PRESSURE: 119 MMHG | TEMPERATURE: 99 F | HEIGHT: 61 IN | OXYGEN SATURATION: 100 %

## 2022-06-20 DIAGNOSIS — N89.8 VAGINAL DISCHARGE: Primary | ICD-10-CM

## 2022-06-20 LAB
B-HCG UR QL: NEGATIVE
POCT BILIRUBIN URINE: NEGATIVE
POCT BLOOD URINE: NEGATIVE
POCT GLUCOSE URINE: NEGATIVE MG/DL
POCT LEUKOCYTE ESTERASE URINE: NEGATIVE
POCT NITRITE URINE: NEGATIVE
POCT PH URINE: 6.5 (ref 5–8)
POCT SPECIFIC GRAVITY URINE: 1.03
POCT URINE CLARITY: CLEAR
POCT URINE COLOR: YELLOW
POCT UROBILINOGEN URINE: 1 MG/DL

## 2022-06-20 PROCEDURE — 81025 URINE PREGNANCY TEST: CPT

## 2022-06-20 PROCEDURE — 87510 GARDNER VAG DNA DIR PROBE: CPT | Performed by: NURSE PRACTITIONER

## 2022-06-20 PROCEDURE — 81002 URINALYSIS NONAUTO W/O SCOPE: CPT | Performed by: NURSE PRACTITIONER

## 2022-06-20 PROCEDURE — 99214 OFFICE O/P EST MOD 30 MIN: CPT

## 2022-06-20 PROCEDURE — 87591 N.GONORRHOEAE DNA AMP PROB: CPT | Performed by: NURSE PRACTITIONER

## 2022-06-20 PROCEDURE — 87491 CHLMYD TRACH DNA AMP PROBE: CPT | Performed by: NURSE PRACTITIONER

## 2022-06-20 PROCEDURE — 87480 CANDIDA DNA DIR PROBE: CPT | Performed by: NURSE PRACTITIONER

## 2022-06-20 PROCEDURE — 87660 TRICHOMONAS VAGIN DIR PROBE: CPT | Performed by: NURSE PRACTITIONER

## 2022-06-20 NOTE — ED INITIAL ASSESSMENT (HPI)
Urinary urgency / frequency x 1 week; vaginal itchiness since last thurs    No vag discharge/bleeding, no hematuria/vom/fever/back pain/abd pain

## 2022-06-21 LAB
C TRACH DNA SPEC QL NAA+PROBE: NEGATIVE
N GONORRHOEA DNA SPEC QL NAA+PROBE: NEGATIVE

## 2022-06-21 RX ORDER — METRONIDAZOLE 500 MG/1
500 TABLET ORAL 3 TIMES DAILY
Qty: 30 TABLET | Refills: 0 | Status: SHIPPED | OUTPATIENT
Start: 2022-06-21 | End: 2022-07-01

## 2022-07-08 ENCOUNTER — HOSPITAL ENCOUNTER (OUTPATIENT)
Age: 27
Discharge: HOME | End: 2022-07-08
Payer: MEDICAID

## 2022-07-08 VITALS
WEIGHT: 140 LBS | HEIGHT: 61 IN | DIASTOLIC BLOOD PRESSURE: 72 MMHG | TEMPERATURE: 98 F | SYSTOLIC BLOOD PRESSURE: 114 MMHG | HEART RATE: 98 BPM | RESPIRATION RATE: 16 BRPM | OXYGEN SATURATION: 100 % | BODY MASS INDEX: 26.43 KG/M2

## 2022-07-08 DIAGNOSIS — J01.40 ACUTE PANSINUSITIS, RECURRENCE NOT SPECIFIED: Primary | ICD-10-CM

## 2022-07-08 DIAGNOSIS — R09.82 PND (POST-NASAL DRIP): ICD-10-CM

## 2022-07-08 LAB
B-HCG UR QL: NEGATIVE
SARS-COV-2 RNA RESP QL NAA+PROBE: NOT DETECTED

## 2022-07-08 PROCEDURE — 99213 OFFICE O/P EST LOW 20 MIN: CPT

## 2022-07-08 PROCEDURE — 81025 URINE PREGNANCY TEST: CPT

## 2022-07-08 RX ORDER — NORELGESTROMIN AND ETHINYL ESTRADIOL 150; 35 UG/D; UG/D
PATCH TRANSDERMAL
Qty: 3 PATCH | Refills: 0 | Status: SHIPPED | OUTPATIENT
Start: 2022-07-08

## 2022-07-08 RX ORDER — DEXAMETHASONE 4 MG/1
16 TABLET ORAL ONCE
Status: COMPLETED | OUTPATIENT
Start: 2022-07-08 | End: 2022-07-08

## 2022-07-08 RX ORDER — DOXYCYCLINE HYCLATE 100 MG/1
100 CAPSULE ORAL 2 TIMES DAILY
Qty: 14 CAPSULE | Refills: 0 | Status: SHIPPED | OUTPATIENT
Start: 2022-07-08 | End: 2022-07-15

## 2022-07-08 RX ORDER — CETIRIZINE HYDROCHLORIDE 10 MG/1
10 TABLET ORAL DAILY
Qty: 30 TABLET | Refills: 0 | Status: SHIPPED | OUTPATIENT
Start: 2022-07-08 | End: 2022-08-07

## 2022-07-08 NOTE — ED INITIAL ASSESSMENT (HPI)
Pt presents today with c/o sinus pressure and congestion x 1.5 weeks. Pt states that it all seems to be on the right side of her face and head. Pt denies any fevers.

## 2022-07-08 NOTE — TELEPHONE ENCOUNTER
Requested Prescriptions     Pending Prescriptions Disp Refills   Cecilio Arita 150-35 MCG/24HR Transdermal Patch Weekly [Pharmacy Med Name: Liban Doherty 9 patch 1     Sig: APPLY 1 PATCH TOPICALLY TO THE SKIN 1 TIME A WEEK     PP exam 1/31/22  Last filled 1/31/22  Pap overdue    Next annual due now. Our office cancelled 7/7/22 appt. MyChart sent to pt to reschedule.

## 2022-08-29 RX ORDER — NORELGESTROMIN AND ETHINYL ESTRADIOL 150; 35 UG/D; UG/D
PATCH TRANSDERMAL
Qty: 3 PATCH | Refills: 0 | OUTPATIENT
Start: 2022-08-29

## 2022-08-29 NOTE — TELEPHONE ENCOUNTER
Requested Prescriptions     Pending Prescriptions Disp Refills   Korin Don 150-35 MCG/24HR Transdermal Patch Weekly [Pharmacy Med Name: Tana Canavan 3 patch 0     Sig: APPLY 1 PATCH TOPICALLY TO THE SKIN 1 TIME A WEEK     PP 1/31/22. Last filled 7/8/22 x 1 mo  Pap never done. Due. Next annual due July 2022. Office cancelled 7/7/22 appt. MyChart sent to pt to reschedule.

## 2023-03-01 ENCOUNTER — APPOINTMENT (OUTPATIENT)
Dept: GENERAL RADIOLOGY | Age: 28
End: 2023-03-01
Attending: NURSE PRACTITIONER
Payer: MEDICAID

## 2023-03-01 ENCOUNTER — HOSPITAL ENCOUNTER (OUTPATIENT)
Age: 28
Discharge: HOME | End: 2023-03-01
Payer: MEDICAID

## 2023-03-01 VITALS
HEART RATE: 102 BPM | WEIGHT: 136 LBS | TEMPERATURE: 98 F | RESPIRATION RATE: 18 BRPM | SYSTOLIC BLOOD PRESSURE: 116 MMHG | DIASTOLIC BLOOD PRESSURE: 57 MMHG | BODY MASS INDEX: 25.68 KG/M2 | HEIGHT: 61 IN | OXYGEN SATURATION: 98 %

## 2023-03-01 DIAGNOSIS — K59.00 CONSTIPATION, UNSPECIFIED CONSTIPATION TYPE: Primary | ICD-10-CM

## 2023-03-01 DIAGNOSIS — R10.9 ABDOMINAL PAIN, ACUTE: ICD-10-CM

## 2023-03-01 LAB
B-HCG UR QL: NEGATIVE
POCT BILIRUBIN URINE: NEGATIVE
POCT GLUCOSE URINE: NEGATIVE MG/DL
POCT KETONE URINE: NEGATIVE MG/DL
POCT LEUKOCYTE ESTERASE URINE: NEGATIVE
POCT NITRITE URINE: NEGATIVE
POCT PH URINE: 6.5 (ref 5–8)
POCT PROTEIN URINE: NEGATIVE MG/DL
POCT SPECIFIC GRAVITY URINE: 1.01
POCT URINE CLARITY: CLEAR
POCT URINE COLOR: YELLOW
POCT UROBILINOGEN URINE: 0.2 MG/DL

## 2023-03-01 PROCEDURE — 81025 URINE PREGNANCY TEST: CPT

## 2023-03-01 PROCEDURE — 81002 URINALYSIS NONAUTO W/O SCOPE: CPT | Performed by: NURSE PRACTITIONER

## 2023-03-01 PROCEDURE — 99213 OFFICE O/P EST LOW 20 MIN: CPT

## 2023-03-01 PROCEDURE — 87086 URINE CULTURE/COLONY COUNT: CPT | Performed by: NURSE PRACTITIONER

## 2023-03-01 PROCEDURE — 74018 RADEX ABDOMEN 1 VIEW: CPT | Performed by: NURSE PRACTITIONER

## 2023-03-01 PROCEDURE — 99214 OFFICE O/P EST MOD 30 MIN: CPT

## 2023-03-01 NOTE — DISCHARGE INSTRUCTIONS
Rest and push plenty of water over the next several days. Use 2 Dulcolax suppositories today to help move the stool in your rectum. This will help with the bladder pressure. Start using MiraLAX 2 capfuls twice a day until having good regular bowel movements are about 2 weeks. After this she can decrease to 1 capful twice a day for several weeks, and then decrease to 1 capful once a day for maintenance. Increase the amount of fiber, vegetables, and fruit in your diet. Follow-up with your primary care doctor in the next 1 week as needed.

## 2023-03-01 NOTE — ED INITIAL ASSESSMENT (HPI)
Pt here c/o lower abd pain, and pain on urination since last night. Frequent urination. Denies fever.

## 2023-07-31 ENCOUNTER — HOSPITAL ENCOUNTER (OUTPATIENT)
Age: 28
Discharge: HOME | End: 2023-07-31
Payer: MEDICAID

## 2023-07-31 VITALS
SYSTOLIC BLOOD PRESSURE: 119 MMHG | HEART RATE: 92 BPM | BODY MASS INDEX: 28.51 KG/M2 | HEIGHT: 61 IN | OXYGEN SATURATION: 99 % | DIASTOLIC BLOOD PRESSURE: 66 MMHG | WEIGHT: 151 LBS | RESPIRATION RATE: 20 BRPM | TEMPERATURE: 98 F

## 2023-07-31 DIAGNOSIS — J01.00 ACUTE MAXILLARY SINUSITIS, RECURRENCE NOT SPECIFIED: Primary | ICD-10-CM

## 2023-07-31 LAB — SARS-COV-2 RNA RESP QL NAA+PROBE: NOT DETECTED

## 2023-07-31 PROCEDURE — 99214 OFFICE O/P EST MOD 30 MIN: CPT

## 2023-07-31 PROCEDURE — 99213 OFFICE O/P EST LOW 20 MIN: CPT

## 2023-07-31 RX ORDER — DOXYCYCLINE HYCLATE 100 MG/1
100 CAPSULE ORAL 2 TIMES DAILY
Qty: 14 CAPSULE | Refills: 0 | Status: SHIPPED | OUTPATIENT
Start: 2023-07-31 | End: 2023-08-07

## 2023-07-31 NOTE — DISCHARGE INSTRUCTIONS
Wash hands often  Take antibiotics as directed and to completion  You may benefit from taking an allergy medication daily (e.g. Zyrtec, Xyzal)  You may benefit from taking a decongestant (e.g. Sudafed - pseudoephedrine [behind the pharmacy counter])  You may benefit from using a humidifier and/or steam showers  You may benefit from Flonase nasal spray daily - can take a few weeks to reach full effect  Drink plenty of water (6-8 bottles per day)

## 2023-10-20 ENCOUNTER — TELEPHONE (OUTPATIENT)
Dept: OBGYN CLINIC | Facility: CLINIC | Age: 28
End: 2023-10-20

## 2023-10-20 NOTE — TELEPHONE ENCOUNTER
Patient calling to initiate prenatal care  LMP 09/18/23  Patient is 7-8 weeks on 11/13  Confirmation Ultrasound and Appointment scheduled on 11/16    Any history of ectopic pregnancy? no  Any history of miscarriage? no  Any medications that you are taking on a regular basis other than prenatal vitamins?  liv (if not taking prenatal vitamins, encourage patient to start taking.)  Any bleeding since the first day of last LMP and your positive pregnancy test? no    Insurance blue com/medicaid

## 2023-11-14 ENCOUNTER — HOSPITAL ENCOUNTER (EMERGENCY)
Facility: HOSPITAL | Age: 28
Discharge: HOME | End: 2023-11-14
Attending: EMERGENCY MEDICINE
Payer: MEDICAID

## 2023-11-14 VITALS
HEART RATE: 94 BPM | WEIGHT: 157 LBS | BODY MASS INDEX: 29.64 KG/M2 | RESPIRATION RATE: 20 BRPM | TEMPERATURE: 98 F | OXYGEN SATURATION: 100 % | SYSTOLIC BLOOD PRESSURE: 109 MMHG | HEIGHT: 61 IN | DIASTOLIC BLOOD PRESSURE: 72 MMHG

## 2023-11-14 DIAGNOSIS — E87.6 HYPOKALEMIA: ICD-10-CM

## 2023-11-14 DIAGNOSIS — O21.0 HYPEREMESIS GRAVIDARUM: Primary | ICD-10-CM

## 2023-11-14 LAB
ALBUMIN SERPL-MCNC: 3.9 G/DL (ref 3.4–5)
ALBUMIN/GLOB SERPL: 0.9 {RATIO} (ref 1–2)
ALP LIVER SERPL-CCNC: 64 U/L
ALT SERPL-CCNC: 13 U/L
ANION GAP SERPL CALC-SCNC: 9 MMOL/L (ref 0–18)
AST SERPL-CCNC: 8 U/L (ref 15–37)
B-HCG SERPL-ACNC: ABNORMAL MIU/ML
BASOPHILS # BLD AUTO: 0.03 X10(3) UL (ref 0–0.2)
BASOPHILS NFR BLD AUTO: 0.4 %
BILIRUB SERPL-MCNC: 0.5 MG/DL (ref 0.1–2)
BUN BLD-MCNC: 10 MG/DL (ref 9–23)
CALCIUM BLD-MCNC: 9.5 MG/DL (ref 8.5–10.1)
CHLORIDE SERPL-SCNC: 104 MMOL/L (ref 98–112)
CO2 SERPL-SCNC: 22 MMOL/L (ref 21–32)
CREAT BLD-MCNC: 0.81 MG/DL
EGFRCR SERPLBLD CKD-EPI 2021: 103 ML/MIN/1.73M2 (ref 60–?)
EOSINOPHIL # BLD AUTO: 0.05 X10(3) UL (ref 0–0.7)
EOSINOPHIL NFR BLD AUTO: 0.6 %
ERYTHROCYTE [DISTWIDTH] IN BLOOD BY AUTOMATED COUNT: 12.9 %
GLOBULIN PLAS-MCNC: 4.3 G/DL (ref 2.8–4.4)
GLUCOSE BLD-MCNC: 88 MG/DL (ref 70–99)
HCT VFR BLD AUTO: 42.8 %
HGB BLD-MCNC: 14.3 G/DL
IMM GRANULOCYTES # BLD AUTO: 0.02 X10(3) UL (ref 0–1)
IMM GRANULOCYTES NFR BLD: 0.2 %
LYMPHOCYTES # BLD AUTO: 1.01 X10(3) UL (ref 1–4)
LYMPHOCYTES NFR BLD AUTO: 11.8 %
MCH RBC QN AUTO: 29.5 PG (ref 26–34)
MCHC RBC AUTO-ENTMCNC: 33.4 G/DL (ref 31–37)
MCV RBC AUTO: 88.4 FL
MONOCYTES # BLD AUTO: 0.51 X10(3) UL (ref 0.1–1)
MONOCYTES NFR BLD AUTO: 6 %
NEUTROPHILS # BLD AUTO: 6.92 X10 (3) UL (ref 1.5–7.7)
NEUTROPHILS # BLD AUTO: 6.92 X10(3) UL (ref 1.5–7.7)
NEUTROPHILS NFR BLD AUTO: 81 %
OSMOLALITY SERPL CALC.SUM OF ELEC: 278 MOSM/KG (ref 275–295)
PLATELET # BLD AUTO: 251 10(3)UL (ref 150–450)
POTASSIUM SERPL-SCNC: 3.2 MMOL/L (ref 3.5–5.1)
PROT SERPL-MCNC: 8.2 G/DL (ref 6.4–8.2)
RBC # BLD AUTO: 4.84 X10(6)UL
SODIUM SERPL-SCNC: 135 MMOL/L (ref 136–145)
WBC # BLD AUTO: 8.5 X10(3) UL (ref 4–11)

## 2023-11-14 PROCEDURE — 85025 COMPLETE CBC W/AUTO DIFF WBC: CPT | Performed by: EMERGENCY MEDICINE

## 2023-11-14 PROCEDURE — 99284 EMERGENCY DEPT VISIT MOD MDM: CPT

## 2023-11-14 PROCEDURE — 84702 CHORIONIC GONADOTROPIN TEST: CPT | Performed by: EMERGENCY MEDICINE

## 2023-11-14 PROCEDURE — 80053 COMPREHEN METABOLIC PANEL: CPT | Performed by: EMERGENCY MEDICINE

## 2023-11-14 PROCEDURE — 96374 THER/PROPH/DIAG INJ IV PUSH: CPT

## 2023-11-14 PROCEDURE — 96361 HYDRATE IV INFUSION ADD-ON: CPT

## 2023-11-14 RX ORDER — ONDANSETRON 2 MG/ML
4 INJECTION INTRAMUSCULAR; INTRAVENOUS ONCE
Status: COMPLETED | OUTPATIENT
Start: 2023-11-14 | End: 2023-11-14

## 2023-11-14 RX ORDER — DOXYLAMINE SUCCINATE AND PYRIDOXINE HYDROCHLORIDE, DELAYED RELEASE TABLETS 10 MG/10 MG 10; 10 MG/1; MG/1
2 TABLET, DELAYED RELEASE ORAL NIGHTLY
Qty: 30 TABLET | Refills: 0 | Status: SHIPPED | OUTPATIENT
Start: 2023-11-14

## 2023-11-14 NOTE — ED INITIAL ASSESSMENT (HPI)
Pt c/o n/v for a few days. Pt is 8 weeks pregnant. Hx of hyperemesis with last pregnancy. Denies bleeding or cramping and pain.

## 2023-11-16 ENCOUNTER — OFFICE VISIT (OUTPATIENT)
Dept: OBGYN CLINIC | Facility: CLINIC | Age: 28
End: 2023-11-16
Payer: MEDICAID

## 2023-11-16 ENCOUNTER — ULTRASOUND ENCOUNTER (OUTPATIENT)
Dept: OBGYN CLINIC | Facility: CLINIC | Age: 28
End: 2023-11-16
Payer: MEDICAID

## 2023-11-16 VITALS
WEIGHT: 153 LBS | BODY MASS INDEX: 29 KG/M2 | HEART RATE: 97 BPM | DIASTOLIC BLOOD PRESSURE: 66 MMHG | SYSTOLIC BLOOD PRESSURE: 104 MMHG

## 2023-11-16 DIAGNOSIS — N91.1 SECONDARY AMENORRHEA: ICD-10-CM

## 2023-11-16 DIAGNOSIS — O21.9 NAUSEA/VOMITING IN PREGNANCY: Primary | ICD-10-CM

## 2023-11-16 PROBLEM — Z37.9 NORMAL LABOR (HCC): Status: RESOLVED | Noted: 2021-12-19 | Resolved: 2023-11-16

## 2023-11-16 PROBLEM — O99.891 BACK PAIN AFFECTING PREGNANCY IN THIRD TRIMESTER: Status: RESOLVED | Noted: 2021-12-20 | Resolved: 2023-11-16

## 2023-11-16 PROBLEM — O60.03 PRETERM LABOR IN THIRD TRIMESTER: Status: RESOLVED | Noted: 2021-12-03 | Resolved: 2023-11-16

## 2023-11-16 PROBLEM — O99.891 BACK PAIN AFFECTING PREGNANCY IN THIRD TRIMESTER (HCC): Status: RESOLVED | Noted: 2021-12-20 | Resolved: 2023-11-16

## 2023-11-16 PROBLEM — Z33.1 PREGNANT STATE, INCIDENTAL (HCC): Status: RESOLVED | Noted: 2021-07-30 | Resolved: 2023-11-16

## 2023-11-16 PROBLEM — O47.9 IRREGULAR CONTRACTIONS (HCC): Status: RESOLVED | Noted: 2021-12-19 | Resolved: 2023-11-16

## 2023-11-16 PROBLEM — Z33.1 PREGNANT STATE, INCIDENTAL: Status: RESOLVED | Noted: 2021-07-30 | Resolved: 2023-11-16

## 2023-11-16 PROBLEM — O60.03 PRETERM LABOR IN THIRD TRIMESTER (HCC): Status: RESOLVED | Noted: 2021-12-03 | Resolved: 2023-11-16

## 2023-11-16 PROBLEM — Z37.9 NORMAL LABOR: Status: RESOLVED | Noted: 2021-12-19 | Resolved: 2023-11-16

## 2023-11-16 PROBLEM — O47.9 IRREGULAR CONTRACTIONS: Status: RESOLVED | Noted: 2021-12-19 | Resolved: 2023-11-16

## 2023-11-16 PROBLEM — M54.9 BACK PAIN AFFECTING PREGNANCY IN THIRD TRIMESTER (HCC): Status: RESOLVED | Noted: 2021-12-20 | Resolved: 2023-11-16

## 2023-11-16 PROBLEM — M54.9 BACK PAIN AFFECTING PREGNANCY IN THIRD TRIMESTER: Status: RESOLVED | Noted: 2021-12-20 | Resolved: 2023-11-16

## 2023-11-16 PROCEDURE — 3078F DIAST BP <80 MM HG: CPT | Performed by: STUDENT IN AN ORGANIZED HEALTH CARE EDUCATION/TRAINING PROGRAM

## 2023-11-16 PROCEDURE — 3074F SYST BP LT 130 MM HG: CPT | Performed by: STUDENT IN AN ORGANIZED HEALTH CARE EDUCATION/TRAINING PROGRAM

## 2023-11-16 PROCEDURE — 99203 OFFICE O/P NEW LOW 30 MIN: CPT | Performed by: STUDENT IN AN ORGANIZED HEALTH CARE EDUCATION/TRAINING PROGRAM

## 2023-11-16 PROCEDURE — 76830 TRANSVAGINAL US NON-OB: CPT | Performed by: STUDENT IN AN ORGANIZED HEALTH CARE EDUCATION/TRAINING PROGRAM

## 2023-11-16 RX ORDER — DOXYLAMINE SUCCINATE AND PYRIDOXINE HYDROCHLORIDE, DELAYED RELEASE TABLETS 10 MG/10 MG 10; 10 MG/1; MG/1
1 TABLET, DELAYED RELEASE ORAL 4 TIMES DAILY
Qty: 120 TABLET | Refills: 3 | Status: SHIPPED
Start: 2023-11-16

## 2023-11-16 RX ORDER — METOCLOPRAMIDE 10 MG/1
10 TABLET ORAL EVERY 6 HOURS PRN
Qty: 30 TABLET | Refills: 2 | Status: SHIPPED | OUTPATIENT
Start: 2023-11-16

## 2023-11-16 RX ORDER — NORGESTIMATE AND ETHINYL ESTRADIOL 7DAYSX3 LO
KIT ORAL
COMMUNITY
Start: 2023-07-15

## 2023-11-16 NOTE — PATIENT INSTRUCTIONS
Unisom (doxylamine) 25mg  B6 25mg    Take 1 tablet of Unisom and 1-2 tablets of B6 at bedtime. If this does not improve your nausea in 2 days, then take 1/2 tablet of Unisom and 1 tablet of B6 in the morning. If this still does not improve your nausea in 2 days, then add an additional 1/2 tablet of Unisom and 1 tablet of B6 at noon.     Thank you,     Adriano Junior MD

## 2023-11-17 ENCOUNTER — TELEPHONE (OUTPATIENT)
Dept: OBGYN CLINIC | Facility: CLINIC | Age: 28
End: 2023-11-17

## 2023-11-17 NOTE — TELEPHONE ENCOUNTER
Received some information from [facility] re: pts Doxylamine-Pyridoxine 10-10mg saying denied.  Info placed in nurses bin in [address]

## 2023-11-17 NOTE — TELEPHONE ENCOUNTER
Patient notified and would like to start the appeal process. Form printed and filled out; patient to come to the [place] office to sign. Patient states that she has tried the vitamin B6 and Unisom for this pregnancy but it hasn't helped - OV will need to be updated with this information. Working on doing the IV standing order.

## 2023-11-17 NOTE — TELEPHONE ENCOUNTER
PA was denied and must meet one of the following:     A. Nausea or vomiting. This must be related to being pregnant. This is also known as morning sickness. This drug should NOT be used for hyperemesis gravidarum. This is a condition that causes more severe nausea and vomiting. B. Another Food and Drug Administration (FDA) approved indication for this drug.     - Your doctor must tell us why taking two separate drugs at one time will not work for you. The two drugs are vitamin B-6 and doxylamine succinate. The requested drug contains both of these drugs together. [facility] message sent to patient. We will need to have a form signed so we can appeal and or have her try the OTC option.

## 2023-11-17 NOTE — TELEPHONE ENCOUNTER
IV Standing Order sheet signed by provider. Order for: D5 LR - 1 liter over 1 hour - Up to 3 times a week until ALONSO. Order faxed to the infusion center at [phone]; fax confirmation received. Copy left in the RN  [place]. Infusion center to contact patient to schedule her.

## 2023-11-20 ENCOUNTER — TELEPHONE (OUTPATIENT)
Dept: OBGYN CLINIC | Facility: CLINIC | Age: 28
End: 2023-11-20

## 2023-11-20 NOTE — TELEPHONE ENCOUNTER
Pt called stating she needs excuse letter for school   Missing class due to hyperemesis gravidarum     Please r/o and advise.

## 2023-11-20 NOTE — TELEPHONE ENCOUNTER
Spoke to patient. Patient states she has not been able to attend school at all due to hyperemesis. School needs a letter stating that patient will not be able to attend this quarter which ends on January 1st.  Patient is okay to receive letter by [address]     Routed to Dr. Vincenzo Guerrero- please advise on request for letter

## 2023-11-21 ENCOUNTER — OFFICE VISIT (OUTPATIENT)
Dept: HEMATOLOGY/ONCOLOGY | Age: 28
End: 2023-11-21
Attending: STUDENT IN AN ORGANIZED HEALTH CARE EDUCATION/TRAINING PROGRAM
Payer: MEDICAID

## 2023-11-21 VITALS
DIASTOLIC BLOOD PRESSURE: 71 MMHG | RESPIRATION RATE: 18 BRPM | TEMPERATURE: 98 F | SYSTOLIC BLOOD PRESSURE: 105 MMHG | OXYGEN SATURATION: 100 % | HEART RATE: 96 BPM

## 2023-11-21 DIAGNOSIS — O21.9 NAUSEA/VOMITING IN PREGNANCY: Primary | ICD-10-CM

## 2023-11-21 PROCEDURE — 96360 HYDRATION IV INFUSION INIT: CPT

## 2023-11-21 RX ORDER — DEXTROSE, SODIUM CHLORIDE, SODIUM LACTATE, POTASSIUM CHLORIDE, AND CALCIUM CHLORIDE 5; .6; .31; .03; .02 G/100ML; G/100ML; G/100ML; G/100ML; G/100ML
1000 INJECTION, SOLUTION INTRAVENOUS ONCE
Status: CANCELLED | OUTPATIENT
Start: 2023-11-21

## 2023-11-21 RX ORDER — DEXTROSE, SODIUM CHLORIDE, SODIUM LACTATE, POTASSIUM CHLORIDE, AND CALCIUM CHLORIDE 5; .6; .31; .03; .02 G/100ML; G/100ML; G/100ML; G/100ML; G/100ML
1000 INJECTION, SOLUTION INTRAVENOUS ONCE
Status: COMPLETED | OUTPATIENT
Start: 2023-11-21 | End: 2023-11-21

## 2023-11-21 RX ADMIN — DEXTROSE, SODIUM CHLORIDE, SODIUM LACTATE, POTASSIUM CHLORIDE, AND CALCIUM CHLORIDE 1000 ML: 5; .6; .31; .03; .02 INJECTION, SOLUTION INTRAVENOUS at 14:39:00

## 2023-11-21 NOTE — PROGRESS NOTES
Education Record    Learner:  Patient    Disease / Diagnosis: patient  here for IVF    Barriers / Limitations:  None    Method:  Brief focused and  reinforcement    General Topics:  Plan of care reviewed    Outcome:  Shows understanding   Patient  tolerated infusion, no c/o. Discharged home in stable condition.

## 2023-11-22 ENCOUNTER — TELEPHONE (OUTPATIENT)
Dept: OBGYN CLINIC | Facility: CLINIC | Age: 28
End: 2023-11-22

## 2023-11-22 NOTE — TELEPHONE ENCOUNTER
Spoke to patient and is upset that the school note was not approved. States she is still nauseous and vomiting and hasn't been able to go to work or school. No further questions.

## 2023-11-24 ENCOUNTER — HOSPITAL ENCOUNTER (EMERGENCY)
Facility: HOSPITAL | Age: 28
Discharge: HOME | End: 2023-11-25
Attending: EMERGENCY MEDICINE
Payer: MEDICAID

## 2023-11-24 DIAGNOSIS — O21.0 HYPEREMESIS GRAVIDARUM: Primary | ICD-10-CM

## 2023-11-24 LAB
ALBUMIN SERPL-MCNC: 3.6 G/DL (ref 3.4–5)
ALBUMIN/GLOB SERPL: 0.8 {RATIO} (ref 1–2)
ALP LIVER SERPL-CCNC: 63 U/L
ALT SERPL-CCNC: 22 U/L
ANION GAP SERPL CALC-SCNC: 7 MMOL/L (ref 0–18)
AST SERPL-CCNC: 15 U/L (ref 15–37)
BASOPHILS # BLD AUTO: 0.03 X10(3) UL (ref 0–0.2)
BASOPHILS NFR BLD AUTO: 0.3 %
BILIRUB SERPL-MCNC: 0.4 MG/DL (ref 0.1–2)
BUN BLD-MCNC: 8 MG/DL (ref 9–23)
CALCIUM BLD-MCNC: 9.4 MG/DL (ref 8.5–10.1)
CHLORIDE SERPL-SCNC: 106 MMOL/L (ref 98–112)
CO2 SERPL-SCNC: 24 MMOL/L (ref 21–32)
CREAT BLD-MCNC: 0.74 MG/DL
EGFRCR SERPLBLD CKD-EPI 2021: 114 ML/MIN/1.73M2 (ref 60–?)
EOSINOPHIL # BLD AUTO: 0.03 X10(3) UL (ref 0–0.7)
EOSINOPHIL NFR BLD AUTO: 0.3 %
ERYTHROCYTE [DISTWIDTH] IN BLOOD BY AUTOMATED COUNT: 13.2 %
GLOBULIN PLAS-MCNC: 4.4 G/DL (ref 2.8–4.4)
GLUCOSE BLD-MCNC: 89 MG/DL (ref 70–99)
HCT VFR BLD AUTO: 41.1 %
HGB BLD-MCNC: 13.9 G/DL
IMM GRANULOCYTES # BLD AUTO: 0.04 X10(3) UL (ref 0–1)
IMM GRANULOCYTES NFR BLD: 0.4 %
LYMPHOCYTES # BLD AUTO: 1.47 X10(3) UL (ref 1–4)
LYMPHOCYTES NFR BLD AUTO: 16 %
MCH RBC QN AUTO: 29.4 PG (ref 26–34)
MCHC RBC AUTO-ENTMCNC: 33.8 G/DL (ref 31–37)
MCV RBC AUTO: 86.9 FL
MONOCYTES # BLD AUTO: 0.56 X10(3) UL (ref 0.1–1)
MONOCYTES NFR BLD AUTO: 6.1 %
NEUTROPHILS # BLD AUTO: 7.08 X10 (3) UL (ref 1.5–7.7)
NEUTROPHILS # BLD AUTO: 7.08 X10(3) UL (ref 1.5–7.7)
NEUTROPHILS NFR BLD AUTO: 76.9 %
OSMOLALITY SERPL CALC.SUM OF ELEC: 282 MOSM/KG (ref 275–295)
PLATELET # BLD AUTO: 273 10(3)UL (ref 150–450)
POTASSIUM SERPL-SCNC: 3.5 MMOL/L (ref 3.5–5.1)
PROT SERPL-MCNC: 8 G/DL (ref 6.4–8.2)
RBC # BLD AUTO: 4.73 X10(6)UL
SODIUM SERPL-SCNC: 137 MMOL/L (ref 136–145)
WBC # BLD AUTO: 9.2 X10(3) UL (ref 4–11)

## 2023-11-24 PROCEDURE — 96361 HYDRATE IV INFUSION ADD-ON: CPT

## 2023-11-24 PROCEDURE — 99284 EMERGENCY DEPT VISIT MOD MDM: CPT

## 2023-11-24 PROCEDURE — 85025 COMPLETE CBC W/AUTO DIFF WBC: CPT | Performed by: EMERGENCY MEDICINE

## 2023-11-24 PROCEDURE — 80053 COMPREHEN METABOLIC PANEL: CPT | Performed by: EMERGENCY MEDICINE

## 2023-11-24 PROCEDURE — 96360 HYDRATION IV INFUSION INIT: CPT

## 2023-11-25 VITALS
DIASTOLIC BLOOD PRESSURE: 66 MMHG | HEART RATE: 88 BPM | BODY MASS INDEX: 28.89 KG/M2 | WEIGHT: 153 LBS | RESPIRATION RATE: 15 BRPM | HEIGHT: 61 IN | OXYGEN SATURATION: 100 % | SYSTOLIC BLOOD PRESSURE: 104 MMHG | TEMPERATURE: 99 F

## 2023-11-25 LAB
BILIRUB UR QL STRIP.AUTO: NEGATIVE
CLARITY UR REFRACT.AUTO: CLEAR
COLOR UR AUTO: YELLOW
GLUCOSE UR STRIP.AUTO-MCNC: 30 MG/DL
KETONES UR STRIP.AUTO-MCNC: >150 MG/DL
LEUKOCYTE ESTERASE UR QL STRIP.AUTO: NEGATIVE
NITRITE UR QL STRIP.AUTO: NEGATIVE
PH UR STRIP.AUTO: 6 [PH] (ref 5–8)
PROT UR STRIP.AUTO-MCNC: 70 MG/DL
RBC UR QL AUTO: NEGATIVE
SP GR UR STRIP.AUTO: >1.03 (ref 1–1.03)
UROBILINOGEN UR STRIP.AUTO-MCNC: 4 MG/DL

## 2023-11-25 PROCEDURE — 81001 URINALYSIS AUTO W/SCOPE: CPT | Performed by: EMERGENCY MEDICINE

## 2023-11-25 NOTE — ED INITIAL ASSESSMENT (HPI)
Patient presents  8 weeks pregnant, unable to keep anything down. No cramping or bleeding. C/O decreased urine output. Every day all day vomiting since about 6 weeks pregnant. Patient was taking reglan with no relief. Also taking Diclegis - no improvement. Patient [facility] ordered by OB on Tuesday.

## 2023-11-25 NOTE — ED QUICK NOTES
Patient is 8 weeks pregnant. . Has been taking Diclegis, Reglan for hyperemesis. Patient states she's continuing to vomit. Received IV fluids as outpatient on Tuesday. No cramping or vaginal bleeding.

## 2023-11-26 ENCOUNTER — HOSPITAL ENCOUNTER (EMERGENCY)
Facility: HOSPITAL | Age: 28
Discharge: HOME | End: 2023-11-26
Attending: EMERGENCY MEDICINE
Payer: MEDICAID

## 2023-11-26 VITALS
BODY MASS INDEX: 28.89 KG/M2 | RESPIRATION RATE: 16 BRPM | HEIGHT: 61 IN | DIASTOLIC BLOOD PRESSURE: 70 MMHG | OXYGEN SATURATION: 98 % | SYSTOLIC BLOOD PRESSURE: 110 MMHG | HEART RATE: 90 BPM | TEMPERATURE: 99 F | WEIGHT: 153 LBS

## 2023-11-26 DIAGNOSIS — O21.0 HYPEREMESIS GRAVIDARUM: Primary | ICD-10-CM

## 2023-11-26 LAB
ALBUMIN SERPL-MCNC: 3.3 G/DL (ref 3.4–5)
ALBUMIN/GLOB SERPL: 0.9 {RATIO} (ref 1–2)
ALP LIVER SERPL-CCNC: 53 U/L
ALT SERPL-CCNC: 19 U/L
ANION GAP SERPL CALC-SCNC: 9 MMOL/L (ref 0–18)
AST SERPL-CCNC: 19 U/L (ref 15–37)
BASOPHILS # BLD AUTO: 0.03 X10(3) UL (ref 0–0.2)
BASOPHILS NFR BLD AUTO: 0.4 %
BILIRUB SERPL-MCNC: 0.5 MG/DL (ref 0.1–2)
BUN BLD-MCNC: 5 MG/DL (ref 9–23)
CALCIUM BLD-MCNC: 8.7 MG/DL (ref 8.5–10.1)
CHLORIDE SERPL-SCNC: 106 MMOL/L (ref 98–112)
CO2 SERPL-SCNC: 21 MMOL/L (ref 21–32)
CREAT BLD-MCNC: 0.67 MG/DL
EGFRCR SERPLBLD CKD-EPI 2021: 124 ML/MIN/1.73M2 (ref 60–?)
EOSINOPHIL # BLD AUTO: 0.07 X10(3) UL (ref 0–0.7)
EOSINOPHIL NFR BLD AUTO: 0.8 %
ERYTHROCYTE [DISTWIDTH] IN BLOOD BY AUTOMATED COUNT: 13 %
GLOBULIN PLAS-MCNC: 3.8 G/DL (ref 2.8–4.4)
GLUCOSE BLD-MCNC: 72 MG/DL (ref 70–99)
HCT VFR BLD AUTO: 38.4 %
HGB BLD-MCNC: 13 G/DL
IMM GRANULOCYTES # BLD AUTO: 0.02 X10(3) UL (ref 0–1)
IMM GRANULOCYTES NFR BLD: 0.2 %
LYMPHOCYTES # BLD AUTO: 1.54 X10(3) UL (ref 1–4)
LYMPHOCYTES NFR BLD AUTO: 18.4 %
MCH RBC QN AUTO: 29.9 PG (ref 26–34)
MCHC RBC AUTO-ENTMCNC: 33.9 G/DL (ref 31–37)
MCV RBC AUTO: 88.3 FL
MONOCYTES # BLD AUTO: 0.61 X10(3) UL (ref 0.1–1)
MONOCYTES NFR BLD AUTO: 7.3 %
NEUTROPHILS # BLD AUTO: 6.08 X10 (3) UL (ref 1.5–7.7)
NEUTROPHILS # BLD AUTO: 6.08 X10(3) UL (ref 1.5–7.7)
NEUTROPHILS NFR BLD AUTO: 72.9 %
OSMOLALITY SERPL CALC.SUM OF ELEC: 278 MOSM/KG (ref 275–295)
PLATELET # BLD AUTO: 261 10(3)UL (ref 150–450)
POTASSIUM SERPL-SCNC: 3.6 MMOL/L (ref 3.5–5.1)
PROT SERPL-MCNC: 7.1 G/DL (ref 6.4–8.2)
RBC # BLD AUTO: 4.35 X10(6)UL
SODIUM SERPL-SCNC: 136 MMOL/L (ref 136–145)
WBC # BLD AUTO: 8.4 X10(3) UL (ref 4–11)

## 2023-11-26 PROCEDURE — 85025 COMPLETE CBC W/AUTO DIFF WBC: CPT | Performed by: EMERGENCY MEDICINE

## 2023-11-26 PROCEDURE — 99284 EMERGENCY DEPT VISIT MOD MDM: CPT

## 2023-11-26 PROCEDURE — 96375 TX/PRO/DX INJ NEW DRUG ADDON: CPT

## 2023-11-26 PROCEDURE — 80053 COMPREHEN METABOLIC PANEL: CPT | Performed by: EMERGENCY MEDICINE

## 2023-11-26 PROCEDURE — 96361 HYDRATE IV INFUSION ADD-ON: CPT

## 2023-11-26 PROCEDURE — 96374 THER/PROPH/DIAG INJ IV PUSH: CPT

## 2023-11-26 RX ORDER — METOCLOPRAMIDE HYDROCHLORIDE 5 MG/ML
10 INJECTION INTRAMUSCULAR; INTRAVENOUS ONCE
Status: COMPLETED | OUTPATIENT
Start: 2023-11-26 | End: 2023-11-26

## 2023-11-26 RX ORDER — DIPHENHYDRAMINE HYDROCHLORIDE 50 MG/ML
25 INJECTION INTRAMUSCULAR; INTRAVENOUS ONCE
Status: COMPLETED | OUTPATIENT
Start: 2023-11-26 | End: 2023-11-26

## 2023-11-26 NOTE — ED INITIAL ASSESSMENT (HPI)
, 9 weeks AOG. Here for persistent N/V. Unable to tolerate anything orally.  Denies any abd cramps or vaginal bleeding

## 2023-11-27 ENCOUNTER — TELEPHONE (OUTPATIENT)
Dept: OBGYN CLINIC | Facility: CLINIC | Age: 28
End: 2023-11-27

## 2023-11-27 ENCOUNTER — OFFICE VISIT (OUTPATIENT)
Dept: HEMATOLOGY/ONCOLOGY | Age: 28
End: 2023-11-27
Attending: STUDENT IN AN ORGANIZED HEALTH CARE EDUCATION/TRAINING PROGRAM
Payer: MEDICAID

## 2023-11-27 VITALS
SYSTOLIC BLOOD PRESSURE: 109 MMHG | DIASTOLIC BLOOD PRESSURE: 80 MMHG | TEMPERATURE: 99 F | RESPIRATION RATE: 18 BRPM | OXYGEN SATURATION: 100 % | HEART RATE: 105 BPM

## 2023-11-27 DIAGNOSIS — O21.9 NAUSEA/VOMITING IN PREGNANCY: Primary | ICD-10-CM

## 2023-11-27 PROCEDURE — 96360 HYDRATION IV INFUSION INIT: CPT

## 2023-11-27 RX ORDER — DEXTROSE, SODIUM CHLORIDE, SODIUM LACTATE, POTASSIUM CHLORIDE, AND CALCIUM CHLORIDE 5; .6; .31; .03; .02 G/100ML; G/100ML; G/100ML; G/100ML; G/100ML
1000 INJECTION, SOLUTION INTRAVENOUS ONCE
Status: COMPLETED | OUTPATIENT
Start: 2023-11-27 | End: 2023-11-27

## 2023-11-27 RX ORDER — DEXTROSE, SODIUM CHLORIDE, SODIUM LACTATE, POTASSIUM CHLORIDE, AND CALCIUM CHLORIDE 5; .6; .31; .03; .02 G/100ML; G/100ML; G/100ML; G/100ML; G/100ML
1000 INJECTION, SOLUTION INTRAVENOUS ONCE
OUTPATIENT
Start: 2023-11-27

## 2023-11-27 RX ADMIN — DEXTROSE, SODIUM CHLORIDE, SODIUM LACTATE, POTASSIUM CHLORIDE, AND CALCIUM CHLORIDE 1000 ML: 5; .6; .31; .03; .02 INJECTION, SOLUTION INTRAVENOUS at 14:51:00

## 2023-11-27 NOTE — PROGRESS NOTES
Education Record    Learner:  Patient    Disease / Diagnosis: patient  here for IVF    Barriers / Limitations:  None    Method:  Brief focused, printed material and  reinforcement    General Topics:  Plan of care reviewed    Outcome:  Shows understanding   Patient  tolerated IVF, discharged home in stable condition.

## 2023-11-27 NOTE — TELEPHONE ENCOUNTER
Patient calling with +HPT. LMP was 9/18/23. Cycles are regular, 28d. Today she is dated at 10w0d. Patient agrees to policy to rotate care between all providers in the office, and understands that on-call provider will deliver her. Patient directed to start PNV with iron, DHA and folic acid. HT 5'1\"   lb  BMI 28.92    OBN appt scheduled on 12/5/23.

## 2023-11-27 NOTE — TELEPHONE ENCOUNTER
Patient seen on 11/16/23 for her  appointment. C/o nausea and vomiting and was prescribed Diclegis and Reglan. Orders placed for IV fluids as well. Per patient Diclegis was the only medication that worked for her during previous pregnancies. Her insurance denied Diclegis; waiting for patient to come back to the office again and sign the form to start the appeal process. Fluids initiated 11/21/23. Has been in the ED x2 on 11/24/23 and then again 11/26/23 due to hyperemesis gravidarum. Patient is scheduled to have fluids today as well. Message routed to provider to see if she wants to add any med orders to IVF and or change the frequency. Spoke to patient and states she has vomited 5 times since 7:30 am.   States nothing is working; is still taking the Diclegis and Reglan. Does feel better after IVF but only for a few hours. Discussed maybe adding medication to her IVF and increasing the frequency to see if that helps. Precautions provided.

## 2023-11-27 NOTE — TELEPHONE ENCOUNTER
Patient has been seen in the ER twice for vomiting. She states that she is still not better.  Please call to advise

## 2023-11-27 NOTE — TELEPHONE ENCOUNTER
Pt called to schedule new prenatal care appt/lmp 9/18, due date of 7/03/24. Was previously seen by Colton Malone/[facility].

## 2023-11-28 RX ORDER — FAMOTIDINE 20 MG/1
20 TABLET, FILM COATED ORAL 2 TIMES DAILY
Qty: 40 TABLET | Refills: 0 | Status: SHIPPED | OUTPATIENT
Start: 2023-11-28

## 2023-11-28 RX ORDER — SCOLOPAMINE TRANSDERMAL SYSTEM 1 MG/1
1 PATCH, EXTENDED RELEASE TRANSDERMAL
Qty: 10 PATCH | Refills: 5 | Status: SHIPPED | OUTPATIENT
Start: 2023-11-28

## 2023-11-28 NOTE — TELEPHONE ENCOUNTER
Trae Palmer MD  [facility] [id number] hours ago (5:58 PM)     Can we also ask her to take the following:  Pepcid 20mg BID  Scopalamine patch q72 hours      Trae Palmer MD  [facility] Nurse14 hours ago (5:57 PM)     We can add reglan 10mg. Janna Schreiber, RN  Trae Palmer MD21 hours ago (11:14 AM)     Would you like to add med orders to her IVF and increase the frequency? Right now she's only getting D5 LR 1 L over 1 hour and up to 3 times per week. Patient notified by eTelemetry message to add Pepcid and Scopalamine. Prescription for scopalamine patch pended for provider review and signature.

## 2023-11-29 ENCOUNTER — TELEPHONE (OUTPATIENT)
Dept: OBGYN CLINIC | Facility: CLINIC | Age: 28
End: 2023-11-29

## 2023-11-29 ENCOUNTER — OFFICE VISIT (OUTPATIENT)
Dept: HEMATOLOGY/ONCOLOGY | Age: 28
End: 2023-11-29
Attending: STUDENT IN AN ORGANIZED HEALTH CARE EDUCATION/TRAINING PROGRAM
Payer: MEDICAID

## 2023-11-29 VITALS
OXYGEN SATURATION: 98 % | HEART RATE: 97 BPM | TEMPERATURE: 99 F | SYSTOLIC BLOOD PRESSURE: 113 MMHG | DIASTOLIC BLOOD PRESSURE: 73 MMHG | RESPIRATION RATE: 18 BRPM

## 2023-11-29 DIAGNOSIS — O21.9 NAUSEA/VOMITING IN PREGNANCY: Primary | ICD-10-CM

## 2023-11-29 PROCEDURE — 96360 HYDRATION IV INFUSION INIT: CPT

## 2023-11-29 RX ORDER — ONDANSETRON 4 MG/1
4 TABLET, ORALLY DISINTEGRATING ORAL EVERY 6 HOURS PRN
Qty: 30 TABLET | Refills: 0 | Status: SHIPPED | OUTPATIENT
Start: 2023-11-29

## 2023-11-29 RX ORDER — DEXTROSE, SODIUM CHLORIDE, SODIUM LACTATE, POTASSIUM CHLORIDE, AND CALCIUM CHLORIDE 5; .6; .31; .03; .02 G/100ML; G/100ML; G/100ML; G/100ML; G/100ML
1000 INJECTION, SOLUTION INTRAVENOUS ONCE
Status: CANCELLED | OUTPATIENT
Start: 2023-11-29

## 2023-11-29 RX ORDER — DEXTROSE, SODIUM CHLORIDE, SODIUM LACTATE, POTASSIUM CHLORIDE, AND CALCIUM CHLORIDE 5; .6; .31; .03; .02 G/100ML; G/100ML; G/100ML; G/100ML; G/100ML
1000 INJECTION, SOLUTION INTRAVENOUS ONCE
Status: COMPLETED | OUTPATIENT
Start: 2023-11-29 | End: 2023-11-29

## 2023-11-29 RX ADMIN — DEXTROSE, SODIUM CHLORIDE, SODIUM LACTATE, POTASSIUM CHLORIDE, AND CALCIUM CHLORIDE 1000 ML: 5; .6; .31; .03; .02 INJECTION, SOLUTION INTRAVENOUS at 14:48:00

## 2023-11-29 NOTE — PROGRESS NOTES
Education Record    Learner:  Patient    Disease / Diagnosis: here for IVF    Barriers / Limitations:  None    Method:  Brief focused, printed material and  reinforcement    General Topics:  Plan of care reviewed    Outcome:  patient ambulatory. Still vomiting multiple times a day. Tolerated IVF. Shows understanding.  Discharged in stable condition

## 2023-11-29 NOTE — TELEPHONE ENCOUNTER
Pt is 10w2d calling with severe nausea and vomiting. Pt is currently seeing OB through [facility] but is transferring her care here. She has OBN appt with us on 12/5. Pt has delivered with us in the past.     Pt stated hat she has been dealing with n/v for the last 3 weeks. States she has been vomiting at least 20 times a day. Pt stated that she has been in and out of infusion centers and ER's getting IV hydration. Pt stated she has tried Diclegis, Reglan, and is now on scopolamine patches with no relief. Pt stated she was just discharged from the infusion center and already vomited again. Pt stated she has barely ate anything in 4 days. Pt informed that I will review message with TIM (on call) but advised pt that if she continues to not be able to keep down fluids then she needs to go back to the ER. Pt stated if she does go back to the ER she will try [facility] because [facility] has not been helpful.

## 2023-11-29 NOTE — TELEPHONE ENCOUNTER
Spoke to patient and states she changed her mind about having an . Discussed that we wouldn't do that anyway and that she can follow-up with [facility] if she has additional questions. Patient is transferring care. Reviewed last message about the Pepcid and Scopolamine patch. Will add Reglan to IVF. Patient to discuss getting new orders from new OB provider as we will cancel them once she transfers care. Patient verbalized understating. No further questions.

## 2023-11-29 NOTE — TELEPHONE ENCOUNTER
Review of chart shows normal CMP on the 24th and 26th. I don't think she would need ED a this time. Reviewe from 2021 pregnancy appears to show reglan worked better. I would not try Thorazine at this time. Therefore we can try a different medication.  Has she tried Zofran ODT 4 mg orally q 6 hours prn?

## 2023-11-30 ENCOUNTER — TELEPHONE (OUTPATIENT)
Dept: HEMATOLOGY/ONCOLOGY | Facility: HOSPITAL | Age: 28
End: 2023-11-30

## 2023-11-30 NOTE — TELEPHONE ENCOUNTER
OB office calling to add Reglan to PRN ivf orders. TORB given and office to follow up when MD in office via Fax.

## 2023-12-01 ENCOUNTER — OFFICE VISIT (OUTPATIENT)
Dept: HEMATOLOGY/ONCOLOGY | Age: 28
End: 2023-12-01
Attending: STUDENT IN AN ORGANIZED HEALTH CARE EDUCATION/TRAINING PROGRAM
Payer: MEDICAID

## 2023-12-01 ENCOUNTER — TELEPHONE (OUTPATIENT)
Dept: OBGYN CLINIC | Facility: CLINIC | Age: 28
End: 2023-12-01

## 2023-12-01 DIAGNOSIS — O21.9 NAUSEA/VOMITING IN PREGNANCY: Primary | ICD-10-CM

## 2023-12-01 PROCEDURE — 96361 HYDRATE IV INFUSION ADD-ON: CPT

## 2023-12-01 PROCEDURE — 96374 THER/PROPH/DIAG INJ IV PUSH: CPT

## 2023-12-01 RX ORDER — METOCLOPRAMIDE HYDROCHLORIDE 5 MG/ML
10 INJECTION INTRAMUSCULAR; INTRAVENOUS ONCE
Status: COMPLETED | OUTPATIENT
Start: 2023-12-01 | End: 2023-12-01

## 2023-12-01 RX ORDER — DEXTROSE, SODIUM CHLORIDE, SODIUM LACTATE, POTASSIUM CHLORIDE, AND CALCIUM CHLORIDE 5; .6; .31; .03; .02 G/100ML; G/100ML; G/100ML; G/100ML; G/100ML
1000 INJECTION, SOLUTION INTRAVENOUS ONCE
Status: COMPLETED | OUTPATIENT
Start: 2023-12-01 | End: 2023-12-01

## 2023-12-01 RX ORDER — METOCLOPRAMIDE HYDROCHLORIDE 5 MG/ML
10 INJECTION INTRAMUSCULAR; INTRAVENOUS ONCE
Start: 2023-12-01 | End: 2023-12-01

## 2023-12-01 RX ORDER — DEXTROSE, SODIUM CHLORIDE, SODIUM LACTATE, POTASSIUM CHLORIDE, AND CALCIUM CHLORIDE 5; .6; .31; .03; .02 G/100ML; G/100ML; G/100ML; G/100ML; G/100ML
1000 INJECTION, SOLUTION INTRAVENOUS ONCE
OUTPATIENT
Start: 2023-12-01

## 2023-12-01 RX ADMIN — DEXTROSE, SODIUM CHLORIDE, SODIUM LACTATE, POTASSIUM CHLORIDE, AND CALCIUM CHLORIDE 1000 ML: 5; .6; .31; .03; .02 INJECTION, SOLUTION INTRAVENOUS at 13:48:00

## 2023-12-01 RX ADMIN — METOCLOPRAMIDE HYDROCHLORIDE 10 MG: 5 INJECTION INTRAMUSCULAR; INTRAVENOUS at 13:55:00

## 2023-12-01 NOTE — TELEPHONE ENCOUNTER
10w4d    Pt called c/o hyperemesis with pregnancy, states she has not eaten since last Thrusday, states she is only able to keep fluids down for 45 minutes at a time. States she drinks Apple Juice and Gatorade states she is at [facility] getting IV fluids at this time, my possibly also get IV Reglan. Informed pt she has been receiving care and orders from Dr Zack Etienne  Pt Is aware she has not been established with us for this pregnancy, she has seen Dr Zack Etienne for this pregnancy. Per pt  Had normal ultrasound 11/16/23  Instructed pt to call Dr Castellanos's office to get an appointment for today or tomorrow to discuss Hyperemesis. Pt has an OB Nurse visit 12/5/23, will establish care at that time. Pt aware if unable to keep Fluids down will need to go to ER for IV fluids over the weekend. Pt expressed understanding.

## 2023-12-01 NOTE — PROGRESS NOTES
Education Record     Learner:  Patient     Disease / Diagnosis: IVF     Barriers / Limitations:  None     Method:  Brief focused and  reinforcement     General Topics:  Plan of care reviewed     Outcome:  Pt dc home ambulatory in stable condition. Tolerated IVF.  Pt advised to MD with any further concerns/problems

## 2023-12-04 ENCOUNTER — APPOINTMENT (OUTPATIENT)
Dept: HEMATOLOGY/ONCOLOGY | Age: 28
End: 2023-12-04
Payer: MEDICAID

## 2023-12-05 ENCOUNTER — LAB ENCOUNTER (OUTPATIENT)
Dept: LAB | Age: 28
End: 2023-12-05
Attending: OBSTETRICS & GYNECOLOGY
Payer: MEDICAID

## 2023-12-05 ENCOUNTER — NURSE ONLY (OUTPATIENT)
Dept: OBGYN CLINIC | Facility: CLINIC | Age: 28
End: 2023-12-05

## 2023-12-05 DIAGNOSIS — Z34.81 ENCOUNTER FOR SUPERVISION OF OTHER NORMAL PREGNANCY IN FIRST TRIMESTER: ICD-10-CM

## 2023-12-05 DIAGNOSIS — Z34.81 ENCOUNTER FOR SUPERVISION OF OTHER NORMAL PREGNANCY IN FIRST TRIMESTER: Primary | ICD-10-CM

## 2023-12-05 LAB
ANTIBODY SCREEN: NEGATIVE
HBV SURFACE AG SER-ACNC: <0.1 [IU]/L
HBV SURFACE AG SERPL QL IA: NONREACTIVE
HCV AB SERPL QL IA: NONREACTIVE
HGB S BLD QL SOLY: NEGATIVE
RH BLOOD TYPE: POSITIVE
RUBV IGG SER QL: POSITIVE
RUBV IGG SER-ACNC: 167.7 IU/ML (ref 10–?)
T PALLIDUM AB SER QL IA: NONREACTIVE

## 2023-12-05 PROCEDURE — 86787 VARICELLA-ZOSTER ANTIBODY: CPT

## 2023-12-05 PROCEDURE — 85660 RBC SICKLE CELL TEST: CPT

## 2023-12-05 PROCEDURE — 86850 RBC ANTIBODY SCREEN: CPT

## 2023-12-05 PROCEDURE — 86901 BLOOD TYPING SEROLOGIC RH(D): CPT

## 2023-12-05 PROCEDURE — 86780 TREPONEMA PALLIDUM: CPT

## 2023-12-05 PROCEDURE — 86762 RUBELLA ANTIBODY: CPT

## 2023-12-05 PROCEDURE — 86803 HEPATITIS C AB TEST: CPT

## 2023-12-05 PROCEDURE — 86900 BLOOD TYPING SEROLOGIC ABO: CPT

## 2023-12-05 PROCEDURE — 87389 HIV-1 AG W/HIV-1&-2 AB AG IA: CPT

## 2023-12-05 PROCEDURE — 87340 HEPATITIS B SURFACE AG IA: CPT

## 2023-12-05 PROCEDURE — 87086 URINE CULTURE/COLONY COUNT: CPT

## 2023-12-05 PROCEDURE — 36415 COLL VENOUS BLD VENIPUNCTURE: CPT

## 2023-12-05 RX ORDER — CHOLECALCIFEROL (VITAMIN D3) 25 MCG
1 TABLET,CHEWABLE ORAL DAILY
COMMUNITY

## 2023-12-05 NOTE — PROGRESS NOTES
Pt seen for OBN appt today. Reports having nausea, taking Zofran with relief. Pt is dating 9w6d based off [address] done on 11/16/23, and has an ALONSO of 7/3/24. LMP 9/18/23 (has reg 28d cycles, lasting 5-7 days). Normal PN labs, plus varicella ordered. Pt advised all labs must be completed and resulted prior to NPN appt. If labs are not completed and resulted the NPN appt will be cancelled. Pt verbalized understanding. Assisted pt with scheduling NPN appt with [facility] on 12/7/23. Desires FTS. 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    MEDICAL HISTORY    Anemia No    Anesthetic complications No    Anxiety/Depression  No    Autoimmune Disorder No    Asthma  No    Cancer No    Diabetes  No    Gyne/breast Surgery No    Heart Disease No    Hepatitis/Liver Disease  No    History of blood transfusion No    History of abnormal pap No    Hypertension  No    Infertility  No    Kidney Disease/Frequent UTIs  No    Medication Allergies Yes Amoxicillin   Latex Allergies No    Food Allergies  No    Neurological Disorder/Epilepsy No    Operations/Hospitalizations No    TB exposure  No    Thyroid Dysfunction No    Trauma/Violence  No    Uterine Anomaly  No    Uterine Fibroids  No    Variocosities/DVTs No    Smoker Yes Smoked marijuana daily prior to pregnancy    Drug usage in prior year Yes Marijuana only   Alcohol No    Would you accept a blood transfusion? If no, are you a [employer]?  Yes                INFECTION HISTORY    Chlamydia Yes In 2018   Pt or partner have hx of Genital Herpes No    Gonorrhea Yes In 2018   Hepatitis B No    HIV No    HPV No    MRSA No    Syphilis No    Tattoos Yes    Live with someone or Exposed to TB No    Rash or viral illness since LMP  No    Varicella No    Pets Yes 1 dog       GENETICS SCREENING    Genetic Screening    Genetic Screening/Teratology Counseling- Includes patient, baby's father, or anyone in either family with:  Patient's age 28 years or older as of estimated date of delivery: No     Thalassemia ([facility], [facility], [address], or  background): MCV less than 80: No     Neural tube defect (Meningomyelocele, Spina bifida, or Anencephaly): No     Congenital heart defect: No     Down syndrome: No     Oniel-Sachs ([employer], [place], [place]): No     Canavan disease ([employer]): No     Familial dysautonomia ([employer]): No     Sickle cell disease or trait (): No     Hemophilia or other blood disorders: No     Muscular dystrophy: No    Cystic fibrosis: No     Dwayne's chorea: No     Intellectual disability and/or autism: No     Other inherited genetic or chromosomal disorder: No     Maternal metabolic disorder (eg. Type 1 diabetes, PKU): No     Patient or baby's father had child with birth defects not listed above: No     Recurrent pregnancy loss, or a stillbirth: No     Medications (including supplements, vitamins, herbs, or OTC drugs)/illicit/recreational drugs/alcohol since last menstrual period: Yes     If yes, agent(s) and strength/dosage: See medlist                 MISC    Infant vaccinations  Yes        Pt. Has answered NO 5P questions and has NO  risk factors. Pt. Given What pregnant women need to know handout.

## 2023-12-06 ENCOUNTER — APPOINTMENT (OUTPATIENT)
Dept: HEMATOLOGY/ONCOLOGY | Age: 28
End: 2023-12-06
Payer: MEDICAID

## 2023-12-06 LAB — VZV IGG SER IA-ACNC: 175.2 (ref 165–?)

## 2023-12-07 ENCOUNTER — INITIAL PRENATAL (OUTPATIENT)
Dept: OBGYN CLINIC | Facility: CLINIC | Age: 28
End: 2023-12-07

## 2023-12-07 VITALS
SYSTOLIC BLOOD PRESSURE: 110 MMHG | BODY MASS INDEX: 27 KG/M2 | DIASTOLIC BLOOD PRESSURE: 76 MMHG | HEIGHT: 61 IN | HEART RATE: 134 BPM | WEIGHT: 143 LBS

## 2023-12-07 DIAGNOSIS — Z34.80 ENCOUNTER FOR SUPERVISION OF OTHER NORMAL PREGNANCY, UNSPECIFIED TRIMESTER: Primary | ICD-10-CM

## 2023-12-07 DIAGNOSIS — Z12.4 SCREENING FOR MALIGNANT NEOPLASM OF CERVIX: ICD-10-CM

## 2023-12-07 DIAGNOSIS — Z3A.10 10 WEEKS GESTATION OF PREGNANCY: ICD-10-CM

## 2023-12-07 DIAGNOSIS — Z11.3 ROUTINE SCREENING FOR STI (SEXUALLY TRANSMITTED INFECTION): ICD-10-CM

## 2023-12-07 DIAGNOSIS — N89.8 VAGINAL ODOR: ICD-10-CM

## 2023-12-07 LAB
KETONES (URINE DIPSTICK): 40 MG/DL
MULTISTIX LOT#: ABNORMAL NUMERIC
NITRITE, URINE: NEGATIVE
OCCULT BLOOD: NEGATIVE
PH, URINE: 6.5 (ref 4.5–8)
PROTEIN (URINE DIPSTICK): 30 MG/DL
SPECIFIC GRAVITY: 1.01 (ref 1–1.03)
UROBILINOGEN,SEMI-QN: 8 MG/DL (ref 0–1.9)

## 2023-12-07 PROCEDURE — 0500F INITIAL PRENATAL CARE VISIT: CPT | Performed by: NURSE PRACTITIONER

## 2023-12-07 PROCEDURE — 81002 URINALYSIS NONAUTO W/O SCOPE: CPT | Performed by: NURSE PRACTITIONER

## 2023-12-07 PROCEDURE — 3008F BODY MASS INDEX DOCD: CPT | Performed by: NURSE PRACTITIONER

## 2023-12-07 PROCEDURE — 76801 OB US < 14 WKS SINGLE FETUS: CPT | Performed by: NURSE PRACTITIONER

## 2023-12-07 PROCEDURE — 3078F DIAST BP <80 MM HG: CPT | Performed by: NURSE PRACTITIONER

## 2023-12-07 PROCEDURE — 3074F SYST BP LT 130 MM HG: CPT | Performed by: NURSE PRACTITIONER

## 2023-12-07 RX ORDER — ONDANSETRON 4 MG/1
4 TABLET, ORALLY DISINTEGRATING ORAL EVERY 6 HOURS PRN
Qty: 30 TABLET | Refills: 0 | Status: SHIPPED | OUTPATIENT
Start: 2023-12-07

## 2023-12-07 NOTE — TELEPHONE ENCOUNTER
10w1d Pt requesting refill on Zofran. States still having N/V 2-3 times daily. Is able to keep some food and liquids downs. Pt last given zofran on 11/29, 30 tablets. To [address] on-call if ok for refill? Thank you.

## 2023-12-07 NOTE — PROGRESS NOTES
New OB. US on 11/16/2023 shows SLIUP 7w1d, [facility] 7/3/2024 not c/w with LMP. Has had nausea and vomiting, only 2-3 times a day vomiting. +heart burn. Advised to restart pepcid BID, continue zofran. Failed reglan and diclegis medications. No pelvic pain or bleeding. Pap 2019 LSIL. Pap done today and gc/ct and trich/bv and yeast. +vaginal odor. US today shows SLIUP 10w5d, +FCA. New ob labs reviewed  Desires genetic testing. Hx of glucosuria in her prior pregnancy.

## 2023-12-08 ENCOUNTER — APPOINTMENT (OUTPATIENT)
Dept: HEMATOLOGY/ONCOLOGY | Age: 28
End: 2023-12-08
Payer: MEDICAID

## 2023-12-08 LAB
BV BACTERIA DNA VAG QL NAA+PROBE: NEGATIVE
C GLABRATA DNA VAG QL NAA+PROBE: NEGATIVE
C KRUSEI DNA VAG QL NAA+PROBE: NEGATIVE
C TRACH DNA SPEC QL NAA+PROBE: NEGATIVE
CANDIDA DNA VAG QL NAA+PROBE: NEGATIVE
N GONORRHOEA DNA SPEC QL NAA+PROBE: NEGATIVE
T VAGINALIS DNA VAG QL NAA+PROBE: NEGATIVE

## 2023-12-11 ENCOUNTER — TELEPHONE (OUTPATIENT)
Dept: OBGYN CLINIC | Facility: CLINIC | Age: 28
End: 2023-12-11

## 2023-12-11 DIAGNOSIS — Z36.9 FIRST TRIMESTER SCREENING: Primary | ICD-10-CM

## 2023-12-11 NOTE — TELEPHONE ENCOUNTER
----- Message from MARY Owusu sent at 12/7/2023  2:11 PM CST -----  Regarding: MFM  Patient desires genetic testing with M.  Please place referral    MARY Owusu

## 2023-12-12 ENCOUNTER — TELEPHONE (OUTPATIENT)
Dept: OBGYN CLINIC | Facility: CLINIC | Age: 28
End: 2023-12-12

## 2023-12-12 ENCOUNTER — HOSPITAL ENCOUNTER (EMERGENCY)
Facility: HOSPITAL | Age: 28
Discharge: HOME | End: 2023-12-12
Attending: EMERGENCY MEDICINE
Payer: MEDICAID

## 2023-12-12 VITALS
TEMPERATURE: 99 F | RESPIRATION RATE: 20 BRPM | DIASTOLIC BLOOD PRESSURE: 80 MMHG | HEART RATE: 105 BPM | SYSTOLIC BLOOD PRESSURE: 110 MMHG | OXYGEN SATURATION: 100 % | BODY MASS INDEX: 27 KG/M2 | WEIGHT: 143 LBS

## 2023-12-12 DIAGNOSIS — E87.6 HYPOKALEMIA: ICD-10-CM

## 2023-12-12 DIAGNOSIS — R74.01 TRANSAMINITIS: ICD-10-CM

## 2023-12-12 DIAGNOSIS — O21.0 HYPEREMESIS GRAVIDARUM: Primary | ICD-10-CM

## 2023-12-12 LAB
ALBUMIN SERPL-MCNC: 4.5 G/DL (ref 3.2–4.8)
ALBUMIN/GLOB SERPL: 1.4 {RATIO} (ref 1–2)
ALP LIVER SERPL-CCNC: 63 U/L
ALT SERPL-CCNC: 222 U/L
ANION GAP SERPL CALC-SCNC: 7 MMOL/L (ref 0–18)
AST SERPL-CCNC: 85 U/L (ref ?–34)
BASOPHILS # BLD AUTO: 0.02 X10(3) UL (ref 0–0.2)
BASOPHILS NFR BLD AUTO: 0.2 %
BILIRUB SERPL-MCNC: 0.5 MG/DL (ref 0.3–1.2)
BUN BLD-MCNC: 5 MG/DL (ref 9–23)
BUN/CREAT SERPL: 7.2 (ref 10–20)
CALCIUM BLD-MCNC: 10 MG/DL (ref 8.7–10.4)
CHLORIDE SERPL-SCNC: 101 MMOL/L (ref 98–112)
CO2 SERPL-SCNC: 24 MMOL/L (ref 21–32)
CREAT BLD-MCNC: 0.69 MG/DL
DEPRECATED RDW RBC AUTO: 40.5 FL (ref 35.1–46.3)
EGFRCR SERPLBLD CKD-EPI 2021: 123 ML/MIN/1.73M2 (ref 60–?)
EOSINOPHIL # BLD AUTO: 0.01 X10(3) UL (ref 0–0.7)
EOSINOPHIL NFR BLD AUTO: 0.1 %
ERYTHROCYTE [DISTWIDTH] IN BLOOD BY AUTOMATED COUNT: 13.1 % (ref 11–15)
GLOBULIN PLAS-MCNC: 3.3 G/DL (ref 2.8–4.4)
GLUCOSE BLD-MCNC: 100 MG/DL (ref 70–99)
HCT VFR BLD AUTO: 39.6 %
HGB BLD-MCNC: 13.7 G/DL
IMM GRANULOCYTES # BLD AUTO: 0.03 X10(3) UL (ref 0–1)
IMM GRANULOCYTES NFR BLD: 0.3 %
LYMPHOCYTES # BLD AUTO: 0.86 X10(3) UL (ref 1–4)
LYMPHOCYTES NFR BLD AUTO: 8.7 %
MAGNESIUM SERPL-MCNC: 2 MG/DL (ref 1.6–2.6)
MCH RBC QN AUTO: 29.5 PG (ref 26–34)
MCHC RBC AUTO-ENTMCNC: 34.6 G/DL (ref 31–37)
MCV RBC AUTO: 85.2 FL
MONOCYTES # BLD AUTO: 0.63 X10(3) UL (ref 0.1–1)
MONOCYTES NFR BLD AUTO: 6.4 %
NEUTROPHILS # BLD AUTO: 8.28 X10 (3) UL (ref 1.5–7.7)
NEUTROPHILS # BLD AUTO: 8.28 X10(3) UL (ref 1.5–7.7)
NEUTROPHILS NFR BLD AUTO: 84.3 %
OSMOLALITY SERPL CALC.SUM OF ELEC: 271 MOSM/KG (ref 275–295)
PLATELET # BLD AUTO: 271 10(3)UL (ref 150–450)
POTASSIUM SERPL-SCNC: 3.2 MMOL/L (ref 3.5–5.1)
PROT SERPL-MCNC: 7.8 G/DL (ref 5.7–8.2)
RBC # BLD AUTO: 4.65 X10(6)UL
SODIUM SERPL-SCNC: 132 MMOL/L (ref 136–145)
WBC # BLD AUTO: 9.8 X10(3) UL (ref 4–11)

## 2023-12-12 PROCEDURE — 96375 TX/PRO/DX INJ NEW DRUG ADDON: CPT

## 2023-12-12 PROCEDURE — 83735 ASSAY OF MAGNESIUM: CPT | Performed by: EMERGENCY MEDICINE

## 2023-12-12 PROCEDURE — 80053 COMPREHEN METABOLIC PANEL: CPT | Performed by: EMERGENCY MEDICINE

## 2023-12-12 PROCEDURE — 99284 EMERGENCY DEPT VISIT MOD MDM: CPT

## 2023-12-12 PROCEDURE — 80053 COMPREHEN METABOLIC PANEL: CPT

## 2023-12-12 PROCEDURE — 85025 COMPLETE CBC W/AUTO DIFF WBC: CPT

## 2023-12-12 PROCEDURE — 96374 THER/PROPH/DIAG INJ IV PUSH: CPT

## 2023-12-12 PROCEDURE — 96361 HYDRATE IV INFUSION ADD-ON: CPT

## 2023-12-12 PROCEDURE — 85025 COMPLETE CBC W/AUTO DIFF WBC: CPT | Performed by: EMERGENCY MEDICINE

## 2023-12-12 RX ORDER — METOCLOPRAMIDE HYDROCHLORIDE 5 MG/ML
10 INJECTION INTRAMUSCULAR; INTRAVENOUS ONCE
Status: COMPLETED | OUTPATIENT
Start: 2023-12-12 | End: 2023-12-12

## 2023-12-12 RX ORDER — DIPHENHYDRAMINE HYDROCHLORIDE 50 MG/ML
25 INJECTION INTRAMUSCULAR; INTRAVENOUS ONCE
Status: COMPLETED | OUTPATIENT
Start: 2023-12-12 | End: 2023-12-12

## 2023-12-12 NOTE — DISCHARGE INSTRUCTIONS
Please follow-up with your primary doctor as well as your OB as soon as possible. Continue your antinausea medicines. Drink fluids. Read all instructions. You will need to get your liver enzymes rechecked. Read all instructions for further recommendations. Return to the ER for changes or worsening or new problems.

## 2023-12-12 NOTE — ED QUICK NOTES
Patient reports being 11 weeks pregnancy with complaints of vomiting x 3 days. Patient denies abdominal cramping or bleeding, states Pepcid and Zofran have not been affective.

## 2023-12-12 NOTE — TELEPHONE ENCOUNTER
10w6d.    Per New OB appt on 12/7/23: \"Has had nausea and vomiting, only 2-3 times a day vomiting. +heart burn. Advised to restart pepcid BID, continue zofran. Failed reglan and diclegis medications. \"     Pt reports n/v for the past 3 days, pt states she has not been able to keep anything down including sips of fluids since last night. Advised pt that if she has been unable to keep anything down including sips of water for 6-8 hrs, the recommendation is to be evaluated in the ER for dehydration. Pt states she has not been taking pepcid, but has been taking Zofran with little to no relief. Advised pt to take Pepcid in combination with Zofran as was recommended by [facility]. Also provided additional nausea recs to pt:  Taking B6 supplements, starting with 25mg/day, and increasing dose by 25 mg/day until taking the 100mg/daily maximum / combining with 1/2 unisom at bedtime. Eating smaller, more frequent meals throughout the day. Eating higher protein foods such as eggs, cheese, nuts, meats, as well as fruits/fruit juices which can help to relieve low levels of sugar in the blood that may be contributing to nausea. Sipping on spearmint, peppermint, raspberry leaf teas, 7 up, ginger ale, or Sprite. Starting with ice chips and advancing diet as tolerated. Again reiterated need to be evaluated in ER if unable to keep fluid down for 6-8 hrs or greater. Pt verbalized understanding. To S[facility] for sign-off and any additional recs.

## 2023-12-12 NOTE — ED INITIAL ASSESSMENT (HPI)
Pt to ED with c/o persistent vomiting. Pt states approximately 11 weeks pregnant. Pt denies abdominal pain or bleeding. Pt denies cough or fever.  Pt states no relief from Zofran or Pepcid at home prior to arrival.

## 2023-12-14 ENCOUNTER — HOSPITAL ENCOUNTER (INPATIENT)
Facility: HOSPITAL | Age: 28
LOS: 1 days | Discharge: HOME | End: 2023-12-15
Attending: EMERGENCY MEDICINE | Admitting: INTERNAL MEDICINE
Payer: MEDICAID

## 2023-12-14 ENCOUNTER — TELEPHONE (OUTPATIENT)
Dept: OBGYN CLINIC | Facility: CLINIC | Age: 28
End: 2023-12-14

## 2023-12-14 ENCOUNTER — HOSPITAL ENCOUNTER (INPATIENT)
Facility: HOSPITAL | Age: 28
LOS: 1 days | Discharge: HOME | End: 2023-12-15
Attending: EMERGENCY MEDICINE
Payer: MEDICAID

## 2023-12-14 DIAGNOSIS — E87.6 HYPOKALEMIA: ICD-10-CM

## 2023-12-14 DIAGNOSIS — O21.0 HYPEREMESIS GRAVIDARUM: Primary | ICD-10-CM

## 2023-12-14 LAB
ALBUMIN SERPL-MCNC: 4.2 G/DL (ref 3.2–4.8)
ALBUMIN/GLOB SERPL: 1.4 {RATIO} (ref 1–2)
ALP LIVER SERPL-CCNC: 55 U/L
ALT SERPL-CCNC: 222 U/L
ANION GAP SERPL CALC-SCNC: 9 MMOL/L (ref 0–18)
AST SERPL-CCNC: 89 U/L (ref ?–34)
ATRIAL RATE: 102 BPM
BASOPHILS # BLD AUTO: 0.02 X10(3) UL (ref 0–0.2)
BASOPHILS NFR BLD AUTO: 0.3 %
BILIRUB SERPL-MCNC: 0.5 MG/DL (ref 0.3–1.2)
BUN BLD-MCNC: <5 MG/DL (ref 9–23)
CALCIUM BLD-MCNC: 9.4 MG/DL (ref 8.7–10.4)
CHLORIDE SERPL-SCNC: 102 MMOL/L (ref 98–112)
CO2 SERPL-SCNC: 24 MMOL/L (ref 21–32)
CREAT BLD-MCNC: 0.56 MG/DL
DEPRECATED RDW RBC AUTO: 41.3 FL (ref 35.1–46.3)
EGFRCR SERPLBLD CKD-EPI 2021: 129 ML/MIN/1.73M2 (ref 60–?)
EOSINOPHIL # BLD AUTO: 0.01 X10(3) UL (ref 0–0.7)
EOSINOPHIL NFR BLD AUTO: 0.1 %
ERYTHROCYTE [DISTWIDTH] IN BLOOD BY AUTOMATED COUNT: 13.2 % (ref 11–15)
GLOBULIN PLAS-MCNC: 3.1 G/DL (ref 2.8–4.4)
GLUCOSE BLD-MCNC: 103 MG/DL (ref 70–99)
HCT VFR BLD AUTO: 36.7 %
HGB BLD-MCNC: 13 G/DL
IMM GRANULOCYTES # BLD AUTO: 0.03 X10(3) UL (ref 0–1)
IMM GRANULOCYTES NFR BLD: 0.4 %
LYMPHOCYTES # BLD AUTO: 0.76 X10(3) UL (ref 1–4)
LYMPHOCYTES NFR BLD AUTO: 9.7 %
MCH RBC QN AUTO: 30.4 PG (ref 26–34)
MCHC RBC AUTO-ENTMCNC: 35.4 G/DL (ref 31–37)
MCV RBC AUTO: 85.9 FL
MONOCYTES # BLD AUTO: 0.47 X10(3) UL (ref 0.1–1)
MONOCYTES NFR BLD AUTO: 6 %
NEUTROPHILS # BLD AUTO: 6.56 X10 (3) UL (ref 1.5–7.7)
NEUTROPHILS # BLD AUTO: 6.56 X10(3) UL (ref 1.5–7.7)
NEUTROPHILS NFR BLD AUTO: 83.5 %
P AXIS: 64 DEGREES
P-R INTERVAL: 122 MS
PLATELET # BLD AUTO: 242 10(3)UL (ref 150–450)
POTASSIUM SERPL-SCNC: 3.1 MMOL/L (ref 3.5–5.1)
PROT SERPL-MCNC: 7.3 G/DL (ref 5.7–8.2)
Q-T INTERVAL: 362 MS
QRS DURATION: 76 MS
QTC CALCULATION (BEZET): 471 MS
R AXIS: 50 DEGREES
RBC # BLD AUTO: 4.27 X10(6)UL
SODIUM SERPL-SCNC: 135 MMOL/L (ref 136–145)
T AXIS: 31 DEGREES
VENTRICULAR RATE: 102 BPM
WBC # BLD AUTO: 7.9 X10(3) UL (ref 4–11)

## 2023-12-14 PROCEDURE — 99252 IP/OBS CONSLTJ NEW/EST SF 35: CPT | Performed by: OBSTETRICS & GYNECOLOGY

## 2023-12-14 PROCEDURE — 99223 1ST HOSP IP/OBS HIGH 75: CPT | Performed by: INTERNAL MEDICINE

## 2023-12-14 RX ORDER — METHYLPREDNISOLONE SODIUM SUCCINATE 125 MG/2ML
125 INJECTION, POWDER, LYOPHILIZED, FOR SOLUTION INTRAMUSCULAR; INTRAVENOUS EVERY 8 HOURS
Status: COMPLETED | OUTPATIENT
Start: 2023-12-14 | End: 2023-12-15

## 2023-12-14 RX ORDER — ONDANSETRON 2 MG/ML
4 INJECTION INTRAMUSCULAR; INTRAVENOUS ONCE
Status: COMPLETED | OUTPATIENT
Start: 2023-12-14 | End: 2023-12-14

## 2023-12-14 RX ORDER — CALCIUM CARBONATE 500 MG/1
500 TABLET, CHEWABLE ORAL 3 TIMES DAILY PRN
Status: DISCONTINUED | OUTPATIENT
Start: 2023-12-14 | End: 2023-12-15

## 2023-12-14 RX ORDER — ONDANSETRON 2 MG/ML
INJECTION INTRAMUSCULAR; INTRAVENOUS
Status: COMPLETED
Start: 2023-12-14 | End: 2023-12-14

## 2023-12-14 RX ORDER — METOCLOPRAMIDE HYDROCHLORIDE 5 MG/ML
10 INJECTION INTRAMUSCULAR; INTRAVENOUS ONCE
Status: COMPLETED | OUTPATIENT
Start: 2023-12-14 | End: 2023-12-14

## 2023-12-14 RX ORDER — LIDOCAINE HYDROCHLORIDE 10 MG/ML
INJECTION, SOLUTION EPIDURAL; INFILTRATION; INTRACAUDAL; PERINEURAL
Status: DISCONTINUED
Start: 2023-12-14 | End: 2023-12-14 | Stop reason: WASHOUT

## 2023-12-14 RX ORDER — ONDANSETRON 2 MG/ML
4 INJECTION INTRAMUSCULAR; INTRAVENOUS EVERY 6 HOURS PRN
Status: DISCONTINUED | OUTPATIENT
Start: 2023-12-14 | End: 2023-12-15

## 2023-12-14 RX ORDER — PROCHLORPERAZINE 25 MG
25 SUPPOSITORY, RECTAL RECTAL ONCE
Status: COMPLETED | OUTPATIENT
Start: 2023-12-14 | End: 2023-12-14

## 2023-12-14 RX ORDER — ACETAMINOPHEN 500 MG
500 TABLET ORAL EVERY 4 HOURS PRN
Status: DISCONTINUED | OUTPATIENT
Start: 2023-12-14 | End: 2023-12-15

## 2023-12-14 RX ORDER — DIPHENHYDRAMINE HYDROCHLORIDE 50 MG/ML
25 INJECTION INTRAMUSCULAR; INTRAVENOUS ONCE
Status: COMPLETED | OUTPATIENT
Start: 2023-12-14 | End: 2023-12-14

## 2023-12-14 RX ORDER — SODIUM CHLORIDE 9 MG/ML
INJECTION, SOLUTION INTRAVENOUS CONTINUOUS
Status: DISCONTINUED | OUTPATIENT
Start: 2023-12-14 | End: 2023-12-15

## 2023-12-14 NOTE — ED QUICK NOTES
Orders for admission, patient is aware of plan and ready to go upstairs. Any questions, please call ED RN Shadi Carrillo at extension [phone].      Patient Covid vaccination status: Unvaccinated     COVID Test Ordered in ED: None    COVID Suspicion at Admission: N/A    Running Infusions:  None    Mental Status/LOC at time of transport: A/O x4, 11 weeks pregnant    Other pertinent information:   CIWA score: N/A   NIH score:  N/A

## 2023-12-14 NOTE — TELEPHONE ENCOUNTER
11w1d. Seen in the ER on 12/7, received IV fluids and antiemetics. Pt states vomiting continues daily unable to keep any sips of water down. Has not eaten in days. Pt has tried Reglan, Diclegis and Zofran. Pt states \"my heart is pounding out of my chest\", muscle cramping, fatigue and lightheadedness. Pt instructed to head to ER at this time for IV hydration and antiemetic treatment. Scheduled ER f/u with [facility] tomorrow to discuss options for meds. Pt states understanding.     To TIM on-call for review and sign-off. Thank you,

## 2023-12-15 VITALS
DIASTOLIC BLOOD PRESSURE: 61 MMHG | TEMPERATURE: 98 F | BODY MASS INDEX: 27.98 KG/M2 | SYSTOLIC BLOOD PRESSURE: 95 MMHG | OXYGEN SATURATION: 97 % | RESPIRATION RATE: 16 BRPM | WEIGHT: 148.19 LBS | HEART RATE: 91 BPM | HEIGHT: 61 IN

## 2023-12-15 LAB
ALBUMIN SERPL-MCNC: 3.6 G/DL (ref 3.2–4.8)
ALBUMIN/GLOB SERPL: 1.4 {RATIO} (ref 1–2)
ALP LIVER SERPL-CCNC: 48 U/L
ALT SERPL-CCNC: 193 U/L
ANION GAP SERPL CALC-SCNC: 10 MMOL/L (ref 0–18)
AST SERPL-CCNC: 73 U/L (ref ?–34)
BASOPHILS # BLD AUTO: 0.01 X10(3) UL (ref 0–0.2)
BASOPHILS NFR BLD AUTO: 0.1 %
BILIRUB SERPL-MCNC: 0.5 MG/DL (ref 0.3–1.2)
BUN BLD-MCNC: <5 MG/DL (ref 9–23)
CALCIUM BLD-MCNC: 8.9 MG/DL (ref 8.7–10.4)
CHLORIDE SERPL-SCNC: 106 MMOL/L (ref 98–112)
CO2 SERPL-SCNC: 20 MMOL/L (ref 21–32)
CREAT BLD-MCNC: 0.54 MG/DL
DEPRECATED RDW RBC AUTO: 43.7 FL (ref 35.1–46.3)
EGFRCR SERPLBLD CKD-EPI 2021: 130 ML/MIN/1.73M2 (ref 60–?)
EOSINOPHIL # BLD AUTO: 0 X10(3) UL (ref 0–0.7)
EOSINOPHIL NFR BLD AUTO: 0 %
ERYTHROCYTE [DISTWIDTH] IN BLOOD BY AUTOMATED COUNT: 13.5 % (ref 11–15)
GLOBULIN PLAS-MCNC: 2.5 G/DL (ref 2.8–4.4)
GLUCOSE BLD-MCNC: 111 MG/DL (ref 70–99)
HCT VFR BLD AUTO: 34.1 %
HGB BLD-MCNC: 11.3 G/DL
IMM GRANULOCYTES # BLD AUTO: 0.03 X10(3) UL (ref 0–1)
IMM GRANULOCYTES NFR BLD: 0.4 %
LYMPHOCYTES # BLD AUTO: 0.48 X10(3) UL (ref 1–4)
LYMPHOCYTES NFR BLD AUTO: 5.6 %
MAGNESIUM SERPL-MCNC: 2 MG/DL (ref 1.6–2.6)
MCH RBC QN AUTO: 29.2 PG (ref 26–34)
MCHC RBC AUTO-ENTMCNC: 33.1 G/DL (ref 31–37)
MCV RBC AUTO: 88.1 FL
MONOCYTES # BLD AUTO: 0.05 X10(3) UL (ref 0.1–1)
MONOCYTES NFR BLD AUTO: 0.6 %
NEUTROPHILS # BLD AUTO: 7.96 X10 (3) UL (ref 1.5–7.7)
NEUTROPHILS # BLD AUTO: 7.96 X10(3) UL (ref 1.5–7.7)
NEUTROPHILS NFR BLD AUTO: 93.3 %
PLATELET # BLD AUTO: 223 10(3)UL (ref 150–450)
POTASSIUM SERPL-SCNC: 3.9 MMOL/L (ref 3.5–5.1)
PROT SERPL-MCNC: 6.1 G/DL (ref 5.7–8.2)
RBC # BLD AUTO: 3.87 X10(6)UL
SODIUM SERPL-SCNC: 136 MMOL/L (ref 136–145)
WBC # BLD AUTO: 8.5 X10(3) UL (ref 4–11)

## 2023-12-15 PROCEDURE — 99239 HOSP IP/OBS DSCHRG MGMT >30: CPT | Performed by: INTERNAL MEDICINE

## 2023-12-15 RX ORDER — PREDNISONE 10 MG/1
TABLET ORAL
Qty: 17 TABLET | Refills: 0 | Status: SHIPPED | OUTPATIENT
Start: 2023-12-15 | End: 2023-12-29

## 2023-12-15 RX ORDER — CALCIUM CARBONATE 500 MG/1
500 TABLET, CHEWABLE ORAL 3 TIMES DAILY PRN
Qty: 30 TABLET | Refills: 0 | Status: SHIPPED | OUTPATIENT
Start: 2023-12-15

## 2023-12-15 NOTE — PLAN OF CARE
Problem: GASTROINTESTINAL - ADULT  Goal: Minimal or absence of nausea and vomiting  Description: INTERVENTIONS:  - Maintain adequate hydration with IV or PO as ordered and tolerated  - Nasogastric tube to low intermittent suction as ordered  - Evaluate effectiveness of ordered antiemetic medications  - Provide nonpharmacologic comfort measures as appropriate  - Advance diet as tolerated, if ordered  - Obtain nutritional consult as needed  - Evaluate fluid balance  Outcome: Progressing     Problem: METABOLIC/FLUID AND ELECTROLYTES - ADULT  Goal: Electrolytes maintained within normal limits  Description: INTERVENTIONS:  - Monitor labs and rhythm and assess patient for signs and symptoms of electrolyte imbalances  - Administer electrolyte replacement as ordered  - Monitor response to electrolyte replacements, including rhythm and repeat lab results as appropriate  - Fluid restriction as ordered  - Instruct patient on fluid and nutrition restrictions as appropriate  Outcome: Progressing     Problem: SKIN/TISSUE INTEGRITY - ADULT  Goal: Skin integrity remains intact  Description: INTERVENTIONS  - Assess and document risk factors for pressure ulcer development  - Assess and document skin integrity  - Monitor for areas of redness and/or skin breakdown  - Initiate interventions, skin care algorithm/standards of care as needed  Outcome: Progressing     Problem: PAIN - ADULT  Goal: Verbalizes/displays adequate comfort level or patient's stated pain goal  Description: INTERVENTIONS:  - Encourage pt to monitor pain and request assistance  - Assess pain using appropriate pain scale  - Administer analgesics based on type and severity of pain and evaluate response  - Implement non-pharmacological measures as appropriate and evaluate response  - Consider cultural and social influences on pain and pain management  - Manage/alleviate anxiety  - Utilize distraction and/or relaxation techniques  - Monitor for opioid side effects  - Notify MD/LIP if interventions unsuccessful or patient reports new pain  - Anticipate increased pain with activity and pre-medicate as appropriate  Outcome: Progressing     Problem: RISK FOR INFECTION - ADULT  Goal: Absence of fever/infection during anticipated neutropenic period  Description: INTERVENTIONS  - Monitor WBC  - Administer growth factors as ordered  - Implement neutropenic guidelines  Outcome: Progressing     Problem: SAFETY ADULT - FALL  Goal: Free from fall injury  Description: INTERVENTIONS:  - Assess pt frequently for physical needs  - Identify cognitive and physical deficits and behaviors that affect risk of falls.   - [place] fall precautions as indicated by assessment.  - Educate pt/family on patient safety including physical limitations  - Instruct pt to call for assistance with activity based on assessment  - Modify environment to reduce risk of injury  - Provide assistive devices as appropriate  - Consider OT/PT consult to assist with strengthening/mobility  - Encourage toileting schedule  Outcome: Progressing     Problem: DISCHARGE PLANNING  Goal: Discharge to home or other facility with appropriate resources  Description: INTERVENTIONS:  - Identify barriers to discharge w/pt and caregiver  - Include patient/family/discharge partner in discharge planning  - Arrange for needed discharge resources and transportation as appropriate  - Identify discharge learning needs (meds, wound care, etc)  - Arrange for interpreters to assist at discharge as needed  - Consider post-discharge preferences of patient/family/discharge partner  - Complete POLST form as appropriate  - Assess patient's ability to be responsible for managing their own health  - Refer to Case Management Department for coordinating discharge planning if the patient needs post-hospital services based on physician/LIP order or complex needs related to functional status, cognitive ability or social support system  Outcome: Progressing    Monitoring vitals. Continues on IV fluids. Seen by OB, started of IV solumedrol. NO nausea/vomiting overnight. Safety measures in place. Call light within reach.

## 2023-12-15 NOTE — PLAN OF CARE
Pt a&o x4, room air, on tele, self care. Patient reports having indigestion, PRN tums given. Plan is to discharge home today. Discharge education given. Safety precautions maintained. Call light in reach. Problem: Patient Centered Care  Goal: Patient preferences are identified and integrated in the patient's plan of care  Description: Interventions:  - What would you like us to know as we care for you?  From home with family   - Provide timely, complete, and accurate information to patient/family  - Incorporate patient and family knowledge, values, beliefs, and cultural backgrounds into the planning and delivery of care  - Encourage patient/family to participate in care and decision-making at the level they choose  - Honor patient and family perspectives and choices  Outcome: Progressing     Problem: Patient/Family Goals  Goal: Patient/Family Long Term Goal  Description: Patient's Long Term Goal: Discharge home     Interventions:  - vitals  - labs  - anti-nausea meds   - See additional Care Plan goals for specific interventions  Outcome: Progressing  Goal: Patient/Family Short Term Goal  Description: Patient's Short Term Goal: Relief of nausea     Interventions:   - zofran   - fluids   - See additional Care Plan goals for specific interventions  Outcome: Progressing     Problem: GASTROINTESTINAL - ADULT  Goal: Minimal or absence of nausea and vomiting  Description: INTERVENTIONS:  - Maintain adequate hydration with IV or PO as ordered and tolerated  - Nasogastric tube to low intermittent suction as ordered  - Evaluate effectiveness of ordered antiemetic medications  - Provide nonpharmacologic comfort measures as appropriate  - Advance diet as tolerated, if ordered  - Obtain nutritional consult as needed  - Evaluate fluid balance  Outcome: Progressing     Problem: METABOLIC/FLUID AND ELECTROLYTES - ADULT  Goal: Electrolytes maintained within normal limits  Description: INTERVENTIONS:  - Monitor labs and rhythm and assess patient for signs and symptoms of electrolyte imbalances  - Administer electrolyte replacement as ordered  - Monitor response to electrolyte replacements, including rhythm and repeat lab results as appropriate  - Fluid restriction as ordered  - Instruct patient on fluid and nutrition restrictions as appropriate  Outcome: Progressing     Problem: SKIN/TISSUE INTEGRITY - ADULT  Goal: Skin integrity remains intact  Description: INTERVENTIONS  - Assess and document risk factors for pressure ulcer development  - Assess and document skin integrity  - Monitor for areas of redness and/or skin breakdown  - Initiate interventions, skin care algorithm/standards of care as needed  Outcome: Progressing     Problem: PAIN - ADULT  Goal: Verbalizes/displays adequate comfort level or patient's stated pain goal  Description: INTERVENTIONS:  - Encourage pt to monitor pain and request assistance  - Assess pain using appropriate pain scale  - Administer analgesics based on type and severity of pain and evaluate response  - Implement non-pharmacological measures as appropriate and evaluate response  - Consider cultural and social influences on pain and pain management  - Manage/alleviate anxiety  - Utilize distraction and/or relaxation techniques  - Monitor for opioid side effects  - Notify MD/LIP if interventions unsuccessful or patient reports new pain  - Anticipate increased pain with activity and pre-medicate as appropriate  Outcome: Progressing     Problem: RISK FOR INFECTION - ADULT  Goal: Absence of fever/infection during anticipated neutropenic period  Description: INTERVENTIONS  - Monitor WBC  - Administer growth factors as ordered  - Implement neutropenic guidelines  Outcome: Progressing     Problem: SAFETY ADULT - FALL  Goal: Free from fall injury  Description: INTERVENTIONS:  - Assess pt frequently for physical needs  - Identify cognitive and physical deficits and behaviors that affect risk of falls.   - [place] fall precautions as indicated by assessment.  - Educate pt/family on patient safety including physical limitations  - Instruct pt to call for assistance with activity based on assessment  - Modify environment to reduce risk of injury  - Provide assistive devices as appropriate  - Consider OT/PT consult to assist with strengthening/mobility  - Encourage toileting schedule  Outcome: Progressing     Problem: DISCHARGE PLANNING  Goal: Discharge to home or other facility with appropriate resources  Description: INTERVENTIONS:  - Identify barriers to discharge w/pt and caregiver  - Include patient/family/discharge partner in discharge planning  - Arrange for needed discharge resources and transportation as appropriate  - Identify discharge learning needs (meds, wound care, etc)  - Arrange for interpreters to assist at discharge as needed  - Consider post-discharge preferences of patient/family/discharge partner  - Complete POLST form as appropriate  - Assess patient's ability to be responsible for managing their own health  - Refer to Case Management Department for coordinating discharge planning if the patient needs post-hospital services based on physician/LIP order or complex needs related to functional status, cognitive ability or social support system  Outcome: Progressing

## 2023-12-18 ENCOUNTER — TELEPHONE (OUTPATIENT)
Dept: OBGYN CLINIC | Facility: CLINIC | Age: 28
End: 2023-12-18

## 2023-12-18 NOTE — TELEPHONE ENCOUNTER
Notified pt of EMB message below. Pt agrees. States she is feeling better. Offered pt sooner appt this week for ER f/u. Pt states she is okay with appt during the 1st week of January.  Pt accepts appt with TIM.

## 2023-12-20 ENCOUNTER — HOSPITAL ENCOUNTER (OUTPATIENT)
Dept: PERINATAL CARE | Facility: HOSPITAL | Age: 28
Discharge: HOME | End: 2023-12-20
Attending: OBSTETRICS & GYNECOLOGY
Payer: MEDICAID

## 2023-12-20 ENCOUNTER — LAB ENCOUNTER (OUTPATIENT)
Dept: LAB | Facility: HOSPITAL | Age: 28
End: 2023-12-20
Attending: NURSE PRACTITIONER
Payer: MEDICAID

## 2023-12-20 ENCOUNTER — HOSPITAL ENCOUNTER (OUTPATIENT)
Dept: PERINATAL CARE | Facility: HOSPITAL | Age: 28
Discharge: HOME | End: 2023-12-20
Attending: NURSE PRACTITIONER
Payer: MEDICAID

## 2023-12-20 VITALS
HEART RATE: 132 BPM | DIASTOLIC BLOOD PRESSURE: 76 MMHG | SYSTOLIC BLOOD PRESSURE: 126 MMHG | BODY MASS INDEX: 25 KG/M2 | WEIGHT: 134 LBS

## 2023-12-20 DIAGNOSIS — Z36.9 FIRST TRIMESTER SCREENING: Primary | ICD-10-CM

## 2023-12-20 DIAGNOSIS — Z36.9 FIRST TRIMESTER SCREENING: ICD-10-CM

## 2023-12-20 PROCEDURE — 76801 OB US < 14 WKS SINGLE FETUS: CPT | Performed by: OBSTETRICS & GYNECOLOGY

## 2023-12-25 LAB
GESTATIONAL AGE > OR = 9W:: YES
TEST RESULT: NEGATIVE
TRISOMY 13 (PATAU SYNDROME): NEGATIVE
TRISOMY 18 (EDWARDS SYNDROME): NEGATIVE
TRISOMY 21 (DOWN SYNDROME): NEGATIVE

## 2024-01-03 ENCOUNTER — ROUTINE PRENATAL (OUTPATIENT)
Dept: OBGYN CLINIC | Facility: CLINIC | Age: 29
End: 2024-01-03

## 2024-01-03 VITALS
HEART RATE: 126 BPM | WEIGHT: 143.38 LBS | SYSTOLIC BLOOD PRESSURE: 102 MMHG | DIASTOLIC BLOOD PRESSURE: 64 MMHG | BODY MASS INDEX: 27 KG/M2

## 2024-01-03 DIAGNOSIS — Z34.81 ENCOUNTER FOR SUPERVISION OF OTHER NORMAL PREGNANCY IN FIRST TRIMESTER: Primary | ICD-10-CM

## 2024-01-03 PROBLEM — Z13.79 GENETIC SCREENING: Status: ACTIVE | Noted: 2024-01-03

## 2024-01-03 LAB
APPEARANCE: CLEAR
BILIRUBIN: NEGATIVE
GLUCOSE (URINE DIPSTICK): NEGATIVE MG/DL
KETONES (URINE DIPSTICK): NEGATIVE MG/DL
LEUKOCYTES: NEGATIVE
MULTISTIX LOT#: NORMAL NUMERIC
NITRITE, URINE: NEGATIVE
OCCULT BLOOD: NEGATIVE
PH, URINE: 7.5 (ref 4.5–8)
SPECIFIC GRAVITY: 1 (ref 1–1.03)
URINE-COLOR: YELLOW
UROBILINOGEN,SEMI-QN: 0.2 MG/DL (ref 0–1.9)

## 2024-01-03 PROCEDURE — 3074F SYST BP LT 130 MM HG: CPT | Performed by: OBSTETRICS & GYNECOLOGY

## 2024-01-03 PROCEDURE — 3078F DIAST BP <80 MM HG: CPT | Performed by: OBSTETRICS & GYNECOLOGY

## 2024-01-03 PROCEDURE — 81002 URINALYSIS NONAUTO W/O SCOPE: CPT | Performed by: OBSTETRICS & GYNECOLOGY

## 2024-01-03 PROCEDURE — 99213 OFFICE O/P EST LOW 20 MIN: CPT | Performed by: OBSTETRICS & GYNECOLOGY

## 2024-01-04 NOTE — PROGRESS NOTES
Girl. Pieter and True at visit. Nausea improved after steroids. Now using only Benadryl at night. Level 1 ordered.

## 2024-01-17 ENCOUNTER — HOSPITAL ENCOUNTER (OUTPATIENT)
Age: 29
Discharge: HOME | End: 2024-01-17
Payer: MEDICAID

## 2024-01-17 VITALS
SYSTOLIC BLOOD PRESSURE: 116 MMHG | RESPIRATION RATE: 18 BRPM | WEIGHT: 143 LBS | OXYGEN SATURATION: 99 % | HEART RATE: 120 BPM | TEMPERATURE: 98 F | HEIGHT: 61 IN | BODY MASS INDEX: 27 KG/M2 | DIASTOLIC BLOOD PRESSURE: 63 MMHG

## 2024-01-17 DIAGNOSIS — J01.41 ACUTE RECURRENT PANSINUSITIS: Primary | ICD-10-CM

## 2024-01-17 PROCEDURE — 99213 OFFICE O/P EST LOW 20 MIN: CPT

## 2024-01-17 PROCEDURE — 99214 OFFICE O/P EST MOD 30 MIN: CPT

## 2024-01-17 RX ORDER — CEFDINIR 300 MG/1
300 CAPSULE ORAL 2 TIMES DAILY
Qty: 5 CAPSULE | Refills: 0 | Status: SHIPPED | OUTPATIENT
Start: 2024-01-17

## 2024-01-17 NOTE — DISCHARGE INSTRUCTIONS
Upper respiratory infection supportive care measures to try as applicable:  General:   - Wash hands often   - Disinfect your environment, linens, electronics, etc.   - Drink plenty of fluids! (water, Pedialyte, etc.)   - Get plenty of rest and sleep with head elevated to help with sinus drainage and throat irritation   - Avoid having air blow on your face as this can worsen congestion / cough   - Do not share utensils or drinks   - Tylenol (adult: 650-1000mg) as needed for pain / body aches / fever   - Symptoms may take a few weeks to resolve   - You may benefit from taking a daily multivitamin   - You may benefit from Zinc (~20mg) every other day (and also take when kids start getting ill)   - You may benefit from Vitamin D daily (~2000u)   - Change toothbrush    Sore throat:   - Salt water gargles throughout the day for sore throat   - Cepacol lozenges as needed for sore throat    Cough / Sinus:   - You may benefit from spoonfuls (and/or added to warm drinks) of honey throughout the day for cough   - You may benefit from taking a decongestant (e.g. Sudafed - pseudoephedrine [behind the pharmacy counter]) (may temporarily elevate your heart rate and blood pressure)   - You may benefit from using a humidifier and/or steam showers   - You may benefit from boiling water with lemon and cayenne pepper, then breathing in the steam (you can cover your head with a towel to help funnel the steam)

## 2024-01-17 NOTE — ED PROVIDER NOTES
History     Chief Complaint   Patient presents with    Headache     Possible sinus infection - Entered by patient       Subjective:   HPI    Ravidominga Wu, 28 year old female with notable medical history of recurrent sinusitis who presents with sinus pain / pressure. PATIENT reports facial pain and pressure causing HA starting almost 2-days ago. She reports usually getting sinus infections after her kid is sick, which is try for this occurrence as well. Denies fever, chills. She is tolerating PO well.         Objective:   Past Medical History:   Diagnosis Date    Anemia     Chlamydia     Decorative tattoo     Dysmenorrhea     1st 2 days of menses    Gonorrhea     History of chlamydia     10-19yo    History of gonorrhea     21-19yo    Migraine headache     since age 9    Migraines     UTI (urinary tract infection)               History reviewed. No pertinent surgical history.             Social History     Socioeconomic History    Marital status: Single     Spouse name: Rosie Causey    Number of children: 0    Years of education: 13   Tobacco Use    Smoking status: Never    Smokeless tobacco: Never   Vaping Use    Vaping Use: Never used   Substance and Sexual Activity    Alcohol use: Yes     Comment: rare    Drug use: Yes     Types: Cannabis     Comment: daily prior to pregnancy    Sexual activity: Yes     Partners: Male     Birth control/protection: Condom     Comment: lifetime partners 12   Other Topics Concern    Blood Transfusions No    Caffeine Concern No    Occupational Exposure No    Weight Concern No    Special Diet No     Comment: does not eat eggs    Exercise No    Seat Belt Yes     Social Determinants of Health     Food Insecurity: No Food Insecurity (12/15/2023)    Food Insecurity     Food Insecurity: Never true   Transportation Needs: No Transportation Needs (12/15/2023)    Transportation Needs     Lack of Transportation: No   Housing Stability: Low Risk  (12/15/2023)    Housing Stability      Housing Instability: No              Review of Systems   HENT:  Positive for sinus pressure and sinus pain.    Neurological:  Positive for headaches.   All other systems reviewed and are negative.        Constitutional and vital signs reviewed.      All other systems reviewed and negative except as noted above.    I have reviewed the family history, social history, allergies, and outpatient medications.     History reviewed from EMR: Encounters, problem list, allergies, medications      Physical Exam     ED Triage Vitals [01/17/24 1709]   /63   Pulse 120   Resp 18   Temp 98.1 °F (36.7 °C)   Temp src Temporal   SpO2 99 %   O2 Device None (Room air)       Current:/63   Pulse 120   Temp 98.1 °F (36.7 °C) (Temporal)   Resp 18   Ht 154.9 cm (5' 1\")   Wt 64.9 kg   LMP 09/18/2023 (Exact Date)   SpO2 99%   BMI 27.02 kg/m²       Physical Exam  Vitals and nursing note reviewed.   Constitutional:       General: She is not in acute distress.     Appearance: Normal appearance. She is normal weight. She is not ill-appearing or toxic-appearing.   HENT:      Head: Normocephalic and atraumatic.      Right Ear: External ear normal.      Left Ear: External ear normal.      Nose: Congestion present.      Right Sinus: Maxillary sinus tenderness and frontal sinus tenderness present.      Left Sinus: Maxillary sinus tenderness and frontal sinus tenderness present.      Mouth/Throat:      Mouth: Mucous membranes are moist.   Eyes:      Extraocular Movements: Extraocular movements intact.      Conjunctiva/sclera: Conjunctivae normal.      Pupils: Pupils are equal, round, and reactive to light.   Cardiovascular:      Rate and Rhythm: Regular rhythm. Tachycardia present.      Pulses: Normal pulses.   Pulmonary:      Effort: Pulmonary effort is normal. No respiratory distress.   Musculoskeletal:         General: No swelling, tenderness or signs of injury. Normal range of motion.      Cervical back: Normal range of motion  and neck supple.   Skin:     General: Skin is warm and dry.      Capillary Refill: Capillary refill takes less than 2 seconds.      Coloration: Skin is not jaundiced.   Neurological:      General: No focal deficit present.      Mental Status: She is alert and oriented to person, place, and time. Mental status is at baseline.   Psychiatric:         Mood and Affect: Mood normal.         Behavior: Behavior normal.         Thought Content: Thought content normal.         Judgment: Judgment normal.            ED Course     Labs Reviewed - No data to display  No orders to display       Vitals:    01/17/24 1709   BP: 116/63   Pulse: 120   Resp: 18   Temp: 98.1 °F (36.7 °C)   TempSrc: Temporal   SpO2: 99%   Weight: 64.9 kg   Height: 154.9 cm (5' 1\")            CHRISTOPHER        Don Wu, 28 year old female with medical history as noted above who presents with sinus pain and pressure   - Patient in NAD, +tachycardia (pregnant, will recheck)    - HPI and exam c/w sinusitis. Given duration of symptoms and history, will treat with Cefdinir (patient is currently pregnant)   - Supportive care and infection control measures discussed   - ENT referral advised and provided       ** See ED course for additional information on care provided / interventions / notable events throughout patient's encounter.    ** See below for home care instructions (if applicable)         I have independently reviewed the radiology images, clinical lab results, and ECG tracings as described above (if applicable)        Medical Decision Making  Risk  OTC drugs.  Prescription drug management.        Disposition and Plan     Clinical Impression:  1. Acute recurrent pansinusitis         Disposition:  Discharge  1/17/2024  5:40 pm    Follow-up:  Anthony David MD  [address] [facility] [address]  [phone]      Ear, Nose, Throat referral          Medications Prescribed:  Discharge Medication List as of 1/17/2024  5:41 PM        START taking  these medications    Details   cefdinir 300 MG Oral Cap Take 1 capsule (300 mg total) by mouth 2 (two) times daily., Normal, Disp-5 capsule, R-0             The above patient (and/or guardian) was made aware that an appropriate evaluation has been performed, and that no additional testing is required at this time. In my medical judgment, there is currently no evidence of an immediate life-threatening or surgical condition, therefore discharge is indicated at this time. The patient (and/or guardian) was advised that a small risk still exists that a serious condition could develop. The patient was instructed to arrange close follow-up with their primary care provider (or the referral provider given today). The patient received written and verbal instructions regarding their condition / concerns, demonstrated understanding, and is agreement with the outpatient treatment plan.        Home care instructions:      Upper respiratory infection supportive care measures to try as applicable:  General:   - Wash hands often   - Disinfect your environment, linens, electronics, etc.   - Drink plenty of fluids! (water, Pedialyte, etc.)   - Get plenty of rest and sleep with head elevated to help with sinus drainage and throat irritation   - Avoid having air blow on your face as this can worsen congestion / cough   - Do not share utensils or drinks   - Tylenol (adult: 650-1000mg) as needed for pain / body aches / fever   - Symptoms may take a few weeks to resolve   - You may benefit from taking a daily multivitamin   - You may benefit from Zinc (~20mg) every other day (and also take when kids start getting ill)   - You may benefit from Vitamin D daily (~2000u)   - Change toothbrush    Sore throat:   - Salt water gargles throughout the day for sore throat   - Cepacol lozenges as needed for sore throat    Cough / Sinus:   - You may benefit from spoonfuls (and/or added to warm drinks) of honey throughout the day for cough   - You may  benefit from taking a decongestant (e.g. Sudafed - pseudoephedrine [behind the pharmacy counter]) (may temporarily elevate your heart rate and blood pressure)   - You may benefit from using a humidifier and/or steam showers   - You may benefit from boiling water with lemon and cayenne pepper, then breathing in the steam (you can cover your head with a towel to help funnel the steam)      Gabriel Cook, DNP, APRN, AGACNP-BC, FNP-C, CNL  Adult-Gerontology Acute Care & Family Nurse Practitioner  [facility]

## 2024-01-29 ENCOUNTER — ROUTINE PRENATAL (OUTPATIENT)
Dept: OBGYN CLINIC | Facility: CLINIC | Age: 29
End: 2024-01-29

## 2024-01-29 VITALS
WEIGHT: 143.63 LBS | SYSTOLIC BLOOD PRESSURE: 106 MMHG | DIASTOLIC BLOOD PRESSURE: 66 MMHG | HEART RATE: 123 BPM | BODY MASS INDEX: 27 KG/M2

## 2024-01-29 DIAGNOSIS — Z34.92 ENCOUNTER FOR SUPERVISION OF NORMAL PREGNANCY IN SECOND TRIMESTER, UNSPECIFIED GRAVIDITY: Primary | ICD-10-CM

## 2024-01-29 LAB
APPEARANCE: CLEAR
GLUCOSE (URINE DIPSTICK): >=1000 MG/DL
KETONES (URINE DIPSTICK): NEGATIVE MG/DL
LEUKOCYTES: NEGATIVE
MULTISTIX LOT#: ABNORMAL NUMERIC
NITRITE, URINE: NEGATIVE
OCCULT BLOOD: NEGATIVE
PH, URINE: 6.5 (ref 4.5–8)
SPECIFIC GRAVITY: 1.01 (ref 1–1.03)
URINE-COLOR: YELLOW
UROBILINOGEN,SEMI-QN: 8 MG/DL (ref 0–1.9)

## 2024-02-16 ENCOUNTER — HOSPITAL ENCOUNTER (OUTPATIENT)
Dept: ULTRASOUND IMAGING | Age: 29
Discharge: HOME | End: 2024-02-16
Attending: OBSTETRICS & GYNECOLOGY
Payer: MEDICAID

## 2024-02-16 DIAGNOSIS — Z34.81 ENCOUNTER FOR SUPERVISION OF OTHER NORMAL PREGNANCY IN FIRST TRIMESTER: ICD-10-CM

## 2024-02-16 PROCEDURE — 76805 OB US >/= 14 WKS SNGL FETUS: CPT | Performed by: OBSTETRICS & GYNECOLOGY

## 2024-02-20 ENCOUNTER — TELEPHONE (OUTPATIENT)
Dept: OBGYN CLINIC | Facility: CLINIC | Age: 29
End: 2024-02-20

## 2024-02-20 DIAGNOSIS — O36.8390 FETAL ARRHYTHMIA AFFECTING PREGNANCY, ANTEPARTUM (HCC): Primary | ICD-10-CM

## 2024-02-20 NOTE — TELEPHONE ENCOUNTER
Please request referral for MFM consult , level 2 due to fetal arrhythmia in second trimester. Problem list / ICD-10 code updated. Patient is aware and will look for My Chart message. Thank you.

## 2024-02-28 ENCOUNTER — HOSPITAL ENCOUNTER (OUTPATIENT)
Dept: PERINATAL CARE | Facility: HOSPITAL | Age: 29
Discharge: HOME | End: 2024-02-28
Attending: OBSTETRICS & GYNECOLOGY
Payer: MEDICAID

## 2024-02-28 VITALS
WEIGHT: 156 LBS | HEART RATE: 105 BPM | BODY MASS INDEX: 29 KG/M2 | SYSTOLIC BLOOD PRESSURE: 107 MMHG | DIASTOLIC BLOOD PRESSURE: 61 MMHG

## 2024-02-28 DIAGNOSIS — O36.8390 FETAL ARRHYTHMIA AFFECTING PREGNANCY, ANTEPARTUM (HCC): ICD-10-CM

## 2024-02-28 DIAGNOSIS — Z13.79 GENETIC SCREENING: Primary | ICD-10-CM

## 2024-02-28 DIAGNOSIS — Z03.79 SUSPECTED FETAL CONDITION NOT FOUND: ICD-10-CM

## 2024-02-28 PROCEDURE — 76811 OB US DETAILED SNGL FETUS: CPT | Performed by: OBSTETRICS & GYNECOLOGY

## 2024-02-28 NOTE — PROGRESS NOTES
Reason for Consult:   Dear Dr. Mendoza,    Thank you for requesting ultrasound evaluation and maternal fetal medicine consultation on Don Stock.  As you are aware she is a 28 year old female with a Hart pregnancy.  A maternal-fetal medicine consultation was requested secondary to  fetal arrhythmia noted on 20 wks US.  Her prenatal records and labs were reviewed.    Review of History:     OB History:    OB History    Para Term  AB Living   2 1 1 0 0 1   SAB IAB Ectopic Multiple Live Births   0 0 0 0 1      # Outcome Date GA Lbr Sunny/2nd Weight Sex Delivery Anes PTL Lv   2 Current            1 Term 21 38w1d 10:00 / 00:31 6 lb 14.1 oz (3.12 kg) M NORMAL SPONT EPI N MARC      Name: LEANDER STOCK      Apgar1: 8  Apgar5: 9      Obstetric Comments   Sexually active since age 17             Allergies:  Allergies   Allergen Reactions    Amoxicillin RASH and HIVES      Current Meds:  Current Outpatient Medications   Medication Sig Dispense Refill    cefdinir 300 MG Oral Cap Take 1 capsule (300 mg total) by mouth 2 (two) times daily. (Patient not taking: Reported on 2024) 5 capsule 0    calcium carbonate 500 MG Oral Chew Tab Chew 1 tablet (500 mg total) by mouth 3 (three) times daily as needed. (Patient not taking: Reported on 2024) 30 tablet 0    ondansetron 4 MG Oral Tablet Dispersible Take 1 tablet (4 mg total) by mouth every 6 (six) hours as needed for Nausea. (Patient not taking: Reported on 1/3/2024) 30 tablet 0    prenatal vitamin with DHA 27-0.8-228 MG Oral Cap Take 1 capsule by mouth daily.          HISTORY:  Past Medical History:   Diagnosis Date    Anemia     Chlamydia     Decorative tattoo     Dysmenorrhea     1st 2 days of menses    Gonorrhea     History of chlamydia     [age]yo    History of gonorrhea     [age]yo    Migraine headache     since age 9    Migraines     UTI (urinary tract infection)       No past surgical history on file.   Family History   Problem Relation  Age of Onset    Other (Other) Father         overdose    High Blood Pressure Mother     Cancer Paternal Grandmother         breast ca    Other (Other) Brother         asthma    Other (Other) Maternal Great-Grandmother         aneurysm      Social History     Socioeconomic History    Marital status: Single     Spouse name: Rosie Causey    Number of children: 0    Years of education: 13   Tobacco Use    Smoking status: Never    Smokeless tobacco: Never   Vaping Use    Vaping Use: Never used   Substance and Sexual Activity    Alcohol use: Yes     Comment: rare    Drug use: Yes     Types: Cannabis     Comment: daily prior to pregnancy    Sexual activity: Yes     Partners: Male     Birth control/protection: Condom     Comment: lifetime partners 12   Other Topics Concern    Blood Transfusions No    Caffeine Concern No    Occupational Exposure No    Weight Concern No    Special Diet No     Comment: does not eat eggs    Exercise No    Seat Belt Yes     Social Determinants of Health     Food Insecurity: No Food Insecurity (12/15/2023)    Food Insecurity     Food Insecurity: Never true   Transportation Needs: No Transportation Needs (12/15/2023)    Transportation Needs     Lack of Transportation: No   Housing Stability: Low Risk  (12/15/2023)    Housing Stability     Housing Instability: No        NARRATIVE:     /61   Pulse 105   Wt 156 lb (70.8 kg)   LMP 09/18/2023 (Exact Date)   BMI 29.48 kg/m²           Alert and Oriented.  No acute distress          Abdomen:  soft, nontender, no contractions noted.           extremities:  nontender, no edema             DISCUSSION  During her visit we discussed and reviewed the following issues:        Cardiac arrhythmias result from abnormal automaticity, abnormal conduction, or both. Fetal cardiac arrhythmias complicate 1 to 2 percent of pregnancies. They are categorized according to their rhythm (irregular, regular) and rate (tachycardia, bradycardia). The conduction system  of the fetal heart is functionally mature by 16 weeks of gestation, and produces a regular rhythm and rate between 110 and 160 beats per minute (bpm) for the remainder of the pregnancy.  The most common cause of an irregular rhythm in the fetus is premature atrial contractions. In some cases, there is 1:1 conduction of ectopic beats so the ventricular contraction will also be early. On auscultation, the rhythm will sound irregular, but the rate will be nearly normal.    In other cases, the premature beat will be blocked so there will be no associated ventricular contraction. In these cases, the ventricular rate will be slower than the atrial rate. When the premature beats are blocked intermittently, the rhythm sounds irregular and the heart rate rapidly varies from normal to slow.    Premature atrial contractions are usually benign and intermittent, and may resolve prior to delivery or shortly after birth. However, 1 to 3 percent of fetuses with premature atrial contractions will develop a tachyarrhythmia, which can lead to cardiovascular decompensation. Any fetus with a tachyarrhythmia should be evaluated promptly by fetal cardiology. Congenital heart disease is identified in only 0.3 to 2 percent of fetuses with premature atrial beats          OB ULTRASOUND REPORT   See imaging tab for complete ultrasound report or in PACS    Single IUP in breech presentation.    Placenta is anterior/Fundal.   A 3 vessel cord is noted.  Cardiac activity is present at 142 bpm   g ( 1 lb 3 oz);   MVP is 4.5 cm .       Uterus and adnexa appeared normal  today on US    Normal fetal rhythm today.    Fetal Anatomy:  Visualized with normal appearance: head, face, spine, neck, skin, chest, abdominal wall, gastrointestinal tract, kidneys, bladder, extremities.   Brain: Visualized and normal appearances: brain parenchyma, cerebral ventricles, choroid plexus, Cisterna Magna, midline falx, cerebellum, cerebellar lobes, posterior fossa,  vermis, cavum septi pellucidi.  Face: eyes normal, profile normal, nose normal, lip normal, palate normal.  Heart: visualized and normal appearance: 3 vessel view, four-chamber, left outflow tract, right outflow tract, arches.      Genetic Sonogram:  Nuchal fold normal.  Pylelectasis absent.  No hyperechogenic bowel.  Echogenic intracardiac foci absent. Nasal bone present. Choroid plexus cyst absent.      Summary of Ultrasound findings:  This is a Hart pregnancy    The fetal measurements are consistent with established EDC. No gross ultrasound evidence of structural abnormalities are seen today. No minor markers for aneuploidy are seen. The patient understands that ultrasound cannot rule out all structural and chromosomal abnormalities.         IMPRESSION:   1. IUP @  22w0d   2. Scan consistent with dates  3. No fetal structural abnormalities seen       RECOMMENDATIONS:   1.  Follow up if irregular FHTs noted again      Thank you for allowing me to participate in the care of your patient.  Please do not hesitate to call with any questions or concerns.     Total time spent   30  minutes this calendar day which includes preparing to see the patient including chart review, obtaining and/or reviewing additional medical history, performing a physical exam and evaluation, documenting clinical information in the electronic medical record, independently interpreting results, counseling the patient, communicating results to the patient/family/caregiver and coordinating care.    Vignesh Cespedes D.O.  Maternal Fetal Medicine     Note to patient and family:  The 21st Century Cures Act makes medical notes available to patients in the interest of transparency.  However, please be advised that this is a medical document.  It is intended as a peer to peer communication.  It is written in medical language and may contain abbreviations or verbiage that are technical and unfamiliar.  It may appear blunt or direct.  Medical  documents are intended to carry relevant information, facts as evident, and the clinical opinion of the practitioner.

## 2024-03-06 ENCOUNTER — ROUTINE PRENATAL (OUTPATIENT)
Dept: OBGYN CLINIC | Facility: CLINIC | Age: 29
End: 2024-03-06

## 2024-03-06 VITALS
DIASTOLIC BLOOD PRESSURE: 70 MMHG | HEART RATE: 125 BPM | WEIGHT: 157.38 LBS | SYSTOLIC BLOOD PRESSURE: 108 MMHG | BODY MASS INDEX: 30 KG/M2

## 2024-03-06 DIAGNOSIS — Z34.92 ENCOUNTER FOR SUPERVISION OF NORMAL PREGNANCY IN SECOND TRIMESTER, UNSPECIFIED GRAVIDITY (HCC): Primary | ICD-10-CM

## 2024-03-06 LAB
APPEARANCE: CLEAR
BILIRUBIN: NEGATIVE
GLUCOSE (URINE DIPSTICK): NEGATIVE MG/DL
KETONES (URINE DIPSTICK): NEGATIVE MG/DL
LEUKOCYTES: NEGATIVE
MULTISTIX LOT#: ABNORMAL NUMERIC
NITRITE, URINE: NEGATIVE
OCCULT BLOOD: NEGATIVE
PH, URINE: 8.5 (ref 4.5–8)
PROTEIN (URINE DIPSTICK): NEGATIVE MG/DL
SPECIFIC GRAVITY: 1 (ref 1–1.03)
URINE-COLOR: YELLOW
UROBILINOGEN,SEMI-QN: 1 MG/DL (ref 0–1.9)

## 2024-03-06 PROCEDURE — 99213 OFFICE O/P EST LOW 20 MIN: CPT | Performed by: OBSTETRICS & GYNECOLOGY

## 2024-03-06 PROCEDURE — 81002 URINALYSIS NONAUTO W/O SCOPE: CPT | Performed by: OBSTETRICS & GYNECOLOGY

## 2024-03-07 NOTE — PROGRESS NOTES
Pieter and Dave at visit. Repeat pulse 84. Reviewed MFM US / consult. No f/u needed unless recurrent arrhythmia. No S/S of PTL. 2T lab order.

## 2024-04-03 ENCOUNTER — LAB ENCOUNTER (OUTPATIENT)
Dept: LAB | Age: 29
End: 2024-04-03
Attending: OBSTETRICS & GYNECOLOGY
Payer: MEDICAID

## 2024-04-03 ENCOUNTER — TELEPHONE (OUTPATIENT)
Dept: OBGYN CLINIC | Facility: CLINIC | Age: 29
End: 2024-04-03

## 2024-04-03 DIAGNOSIS — Z34.92 ENCOUNTER FOR SUPERVISION OF NORMAL PREGNANCY IN SECOND TRIMESTER, UNSPECIFIED GRAVIDITY (HCC): ICD-10-CM

## 2024-04-03 DIAGNOSIS — R73.09 ABNORMAL GTT (GLUCOSE TOLERANCE TEST): Primary | ICD-10-CM

## 2024-04-03 LAB
ERYTHROCYTE [DISTWIDTH] IN BLOOD BY AUTOMATED COUNT: 13.5 %
GLUCOSE 1H P GLC SERPL-MCNC: 146 MG/DL
HCT VFR BLD AUTO: 33.1 %
HGB BLD-MCNC: 10.6 G/DL
MCH RBC QN AUTO: 29.5 PG (ref 26–34)
MCHC RBC AUTO-ENTMCNC: 32 G/DL (ref 31–37)
MCV RBC AUTO: 92.2 FL
PLATELET # BLD AUTO: 239 10(3)UL (ref 150–450)
RBC # BLD AUTO: 3.59 X10(6)UL
WBC # BLD AUTO: 11.8 X10(3) UL (ref 4–11)

## 2024-04-03 PROCEDURE — 82950 GLUCOSE TEST: CPT

## 2024-04-03 PROCEDURE — 85027 COMPLETE CBC AUTOMATED: CPT

## 2024-04-03 NOTE — TELEPHONE ENCOUNTER
27w0d. Pt calling, was not able to make her 150 pm SHARONA appt. Pt asking to be seen since she is having a pulling sensation to the right of her belly button. Pt states she notes it is happening 2 times daily, lasting 1-2 mins. Pt states when it does happen it stops her from what she is doing. States she is drinking 64+ ozs of water daily.  Pt denies any Pre-E s/s. +FM, denies LOF, VB or ctxs. States +BM w/o constipation and no UTI s/s.     Pt informed can be related to ligament expanding with pregnancy. Pt scheduled with VENU tomorrow. Informed can take Tylenol for pain and continue to hydrate. Pt informed if pain becomes 7/10 or greater, decrease FM, LOF, VB or ctxs to contact office.     Pt also asking about her 1 hr gtt of 146, pt informed it is elevated and needs to complete 3 hr gtt. Pt informed of fasting and scheduling recs. Asking they be sent via [facility]. Order placed.     To [facility] on-call to please review and advise. Thank you.

## 2024-04-03 NOTE — TELEPHONE ENCOUNTER
Pt needs to reschedule pn from 4/3, not able to make.  Pt also having right side pain going up to her chest.  Pt not sure if muscle cramping or what.    No appt until 2-weeks from now.  Pls advise

## 2024-04-04 ENCOUNTER — ROUTINE PRENATAL (OUTPATIENT)
Dept: OBGYN CLINIC | Facility: CLINIC | Age: 29
End: 2024-04-04

## 2024-04-04 VITALS
HEART RATE: 120 BPM | DIASTOLIC BLOOD PRESSURE: 74 MMHG | SYSTOLIC BLOOD PRESSURE: 109 MMHG | BODY MASS INDEX: 31 KG/M2 | WEIGHT: 165.38 LBS

## 2024-04-04 DIAGNOSIS — Z34.92 ENCOUNTER FOR SUPERVISION OF NORMAL PREGNANCY IN SECOND TRIMESTER, UNSPECIFIED GRAVIDITY (HCC): Primary | ICD-10-CM

## 2024-04-04 PROBLEM — O99.013 ANEMIA COMPLICATING PREGNANCY, THIRD TRIMESTER (HCC): Status: ACTIVE | Noted: 2024-04-04

## 2024-04-04 LAB
BILIRUBIN: NEGATIVE
GLUCOSE (URINE DIPSTICK): NEGATIVE MG/DL
KETONES (URINE DIPSTICK): 15 MG/DL
LEUKOCYTES: NEGATIVE
MULTISTIX LOT#: ABNORMAL NUMERIC
NITRITE, URINE: NEGATIVE
OCCULT BLOOD: NEGATIVE
PH, URINE: 6 (ref 4.5–8)
PROTEIN (URINE DIPSTICK): NEGATIVE MG/DL
SPECIFIC GRAVITY: 1.01 (ref 1–1.03)
UROBILINOGEN,SEMI-QN: 0.2 MG/DL (ref 0–1.9)

## 2024-04-04 PROCEDURE — 81002 URINALYSIS NONAUTO W/O SCOPE: CPT | Performed by: OBSTETRICS & GYNECOLOGY

## 2024-04-04 PROCEDURE — 99213 OFFICE O/P EST LOW 20 MIN: CPT | Performed by: OBSTETRICS & GYNECOLOGY

## 2024-04-04 NOTE — PROGRESS NOTES
Ronen Ochoa. You have anemia in pregnancy. Begin over the counter Slow Fe ( or generic equivalent ) one daily, separate from time of your vitamin. You also failed the glucose screen. You'll need to take the 3 hour glucose tolerance test which is a fasting test and a total of 4 blood draws. I'll ask our Nurses to order and send you another message.

## 2024-04-04 NOTE — PROGRESS NOTES
Scheduled 3 hr GTT.  Taking iron.   Has muscle twitching on right muscle once a day.  RTC 2 wk  See Prenatal Vitals flowsheet for documentation of vitals / urine / exam. See problem list -- updated in detail.   Used 20-29 min in order to do detailed chart review & review of blood work / ultrasound reports / MFM reports, reviewing history, performing prenatal exam / pelvic exam, counseling pt on vaccines, prenatal education, ordering tests or meds, ordering MFM /specialist referrals, documentation in EMR, interpretation / communication of results & care coordination

## 2024-04-08 ENCOUNTER — LAB ENCOUNTER (OUTPATIENT)
Dept: LAB | Age: 29
End: 2024-04-08
Attending: OBSTETRICS & GYNECOLOGY
Payer: MEDICAID

## 2024-04-08 DIAGNOSIS — R73.09 ABNORMAL GTT (GLUCOSE TOLERANCE TEST): ICD-10-CM

## 2024-04-08 LAB
GLUCOSE 1H P GLC SERPL-MCNC: 180 MG/DL
GLUCOSE 2H P GLC SERPL-MCNC: 149 MG/DL
GLUCOSE 3H P GLC SERPL-MCNC: 109 MG/DL (ref 70–140)
GLUCOSE P FAST SERPL-MCNC: 89 MG/DL

## 2024-04-08 PROCEDURE — 82952 GTT-ADDED SAMPLES: CPT

## 2024-04-08 PROCEDURE — 36415 COLL VENOUS BLD VENIPUNCTURE: CPT

## 2024-04-08 PROCEDURE — 82951 GLUCOSE TOLERANCE TEST (GTT): CPT

## 2024-04-22 ENCOUNTER — ROUTINE PRENATAL (OUTPATIENT)
Dept: OBGYN CLINIC | Facility: CLINIC | Age: 29
End: 2024-04-22

## 2024-04-22 ENCOUNTER — TELEPHONE (OUTPATIENT)
Dept: OBGYN CLINIC | Facility: CLINIC | Age: 29
End: 2024-04-22

## 2024-04-22 VITALS
HEART RATE: 132 BPM | BODY MASS INDEX: 32 KG/M2 | DIASTOLIC BLOOD PRESSURE: 71 MMHG | WEIGHT: 170 LBS | SYSTOLIC BLOOD PRESSURE: 111 MMHG

## 2024-04-22 DIAGNOSIS — R00.0 ELEVATED PULSE RATE: Primary | ICD-10-CM

## 2024-04-22 DIAGNOSIS — Z34.91 ENCOUNTER FOR SUPERVISION OF NORMAL PREGNANCY IN FIRST TRIMESTER, UNSPECIFIED GRAVIDITY (HCC): ICD-10-CM

## 2024-04-22 DIAGNOSIS — Z34.91 ENCOUNTER FOR SUPERVISION OF NORMAL PREGNANCY IN FIRST TRIMESTER, UNSPECIFIED GRAVIDITY (HCC): Primary | ICD-10-CM

## 2024-04-22 LAB
APPEARANCE: CLEAR
BILIRUBIN: NEGATIVE
GLUCOSE (URINE DIPSTICK): 500 MG/DL
LEUKOCYTES: NEGATIVE
MULTISTIX LOT#: ABNORMAL NUMERIC
NITRITE, URINE: NEGATIVE
OCCULT BLOOD: NEGATIVE
PH, URINE: 7 (ref 4.5–8)
PROTEIN (URINE DIPSTICK): NEGATIVE MG/DL
SPECIFIC GRAVITY: 1.01 (ref 1–1.03)
URINE-COLOR: YELLOW
UROBILINOGEN,SEMI-QN: 0.2 MG/DL (ref 0–1.9)

## 2024-04-22 PROCEDURE — 99214 OFFICE O/P EST MOD 30 MIN: CPT | Performed by: OBSTETRICS & GYNECOLOGY

## 2024-04-22 PROCEDURE — 81002 URINALYSIS NONAUTO W/O SCOPE: CPT | Performed by: OBSTETRICS & GYNECOLOGY

## 2024-04-23 NOTE — TELEPHONE ENCOUNTER
Please inform pt I want her to see cardiology due to presistent high pulse 120-130s to make sure no underlying cause on her. Please help arrange. Pt already 30 wks pregn.

## 2024-04-23 NOTE — TELEPHONE ENCOUNTER
[facility] cardiology referral placed as STAT. Their number is [phone].     [facility]

## 2024-04-23 NOTE — TELEPHONE ENCOUNTER
Notified pt of message below. Instructed pt to call us back if she is not able to get an appt within 2 weeks. Pt agrees.

## 2024-04-23 NOTE — PROGRESS NOTES
On iron for one week. Check ferritin in 3 wks. TDAP at next visit. Passed 3hr GTT. Problem list updated. At each viist, pt w/ maternal tachycardia -- would like pt to see cardiology to make sure no underlining reason. Office to arrange.     See Prenatal Vitals flowsheet for documentation of vitals / urine / exam. See problem list -- updated in detail.   Used 30-39 min in order to do detailed chart review & review of blood work / ultrasound reports / MFM reports, reviewing history, performing prenatal exam / pelvic exam, counseling pt on vaccines, prenatal education, ordering tests or meds, ordering MFM /specialist referrals, documentation in EMR, interpretation / communication of results & care coordination

## 2024-04-30 ENCOUNTER — TELEPHONE (OUTPATIENT)
Dept: OBGYN CLINIC | Facility: CLINIC | Age: 29
End: 2024-04-30

## 2024-04-30 DIAGNOSIS — L29.9 ITCHING: Primary | ICD-10-CM

## 2024-04-30 NOTE — TELEPHONE ENCOUNTER
Pt informed of [facility] recs and verbalized understanding.   Bile acids ordered and lab locations/ hours reviewed with pt.     Pt advised she can take benadryl (class B) for itch relief in the meantime. Pt verbalized understanding.

## 2024-04-30 NOTE — TELEPHONE ENCOUNTER
Pt 30w6d, calling to report having itching all over. Pt states last night around 11pm, was unable to fall asleep due to itching. Pt took a shower at around 2 am, itching subsided, but was still there. Pt woke up this morning still itching. Pt denies itching on her hands, but states its all over head to toe.     Denies any pain, or rash, or bumps/ bites.   Denies any changes to her soaps, lotions, or detergents.  Denies any changes to her skin, eyes or mucous membranes. LBM yesterday normal.    Pt informed a message will be routed to [facility] for review and recs, and labs might be ordered. Pt verbalized understanding.     Message to [facility] (on-call) to please review and advise.

## 2024-05-01 ENCOUNTER — LAB ENCOUNTER (OUTPATIENT)
Dept: LAB | Age: 29
End: 2024-05-01
Attending: OBSTETRICS & GYNECOLOGY
Payer: MEDICAID

## 2024-05-01 DIAGNOSIS — L29.9 ITCHING: ICD-10-CM

## 2024-05-01 PROCEDURE — 82239 BILE ACIDS TOTAL: CPT

## 2024-05-01 PROCEDURE — 36415 COLL VENOUS BLD VENIPUNCTURE: CPT

## 2024-05-02 LAB — BILE ACIDS: 9.2 UMOL/L

## 2024-05-06 ENCOUNTER — ROUTINE PRENATAL (OUTPATIENT)
Dept: OBGYN CLINIC | Facility: CLINIC | Age: 29
End: 2024-05-06

## 2024-05-06 VITALS
BODY MASS INDEX: 32 KG/M2 | DIASTOLIC BLOOD PRESSURE: 71 MMHG | HEART RATE: 132 BPM | WEIGHT: 171 LBS | SYSTOLIC BLOOD PRESSURE: 107 MMHG

## 2024-05-06 DIAGNOSIS — Z34.83 ENCOUNTER FOR SUPERVISION OF OTHER NORMAL PREGNANCY IN THIRD TRIMESTER (HCC): Primary | ICD-10-CM

## 2024-05-06 DIAGNOSIS — O99.013 ANEMIA COMPLICATING PREGNANCY, THIRD TRIMESTER (HCC): ICD-10-CM

## 2024-05-06 LAB
APPEARANCE: CLEAR
MULTISTIX LOT#: ABNORMAL NUMERIC
NITRITE, URINE: NEGATIVE
URINE-COLOR: YELLOW

## 2024-05-06 NOTE — PROGRESS NOTES
Has been feeling some pressure lately and would like a cervical check. Closed/thick/high. Denies contractions, LOF, or bleeding. Tdap today. CBC and ferritin ordered to be drawn next week. Discussed colace for constipation. She reports that the itching she has is only when she goes to bed; new sheet around the time this started, recommended changing sheets. She has an appointment with cardiology on Wednesday. RTC 2 wks    See Prenatal Vitals flowsheet for documentation of vitals / urine / exam. See problem list -- updated in detail.   Used 20-29 min in order to do detailed chart review & review of blood work / ultrasound reports / MFM reports, reviewing history, performing prenatal exam / pelvic exam, counseling pt on vaccines, prenatal education, ordering tests or meds, ordering MFM /specialist referrals, documentation in EMR, interpretation / communication of results & care coordination

## 2024-05-23 ENCOUNTER — LAB ENCOUNTER (OUTPATIENT)
Dept: LAB | Age: 29
End: 2024-05-23
Attending: OBSTETRICS & GYNECOLOGY

## 2024-05-23 DIAGNOSIS — R00.0 TACHYCARDIA, UNSPECIFIED: Primary | ICD-10-CM

## 2024-05-23 DIAGNOSIS — R00.0 ELEVATED PULSE RATE: ICD-10-CM

## 2024-05-23 DIAGNOSIS — O99.013 ANEMIA COMPLICATING PREGNANCY, THIRD TRIMESTER (HCC): ICD-10-CM

## 2024-05-23 DIAGNOSIS — Z34.90 PREGNANCY (HCC): ICD-10-CM

## 2024-05-23 LAB
DEPRECATED HBV CORE AB SER IA-ACNC: 9.3 NG/ML
DEPRECATED RDW RBC AUTO: 55.4 FL (ref 35.1–46.3)
ERYTHROCYTE [DISTWIDTH] IN BLOOD BY AUTOMATED COUNT: 16.3 % (ref 11–15)
EST. AVERAGE GLUCOSE BLD GHB EST-MCNC: 97 MG/DL (ref 68–126)
HBA1C MFR BLD: 5 % (ref ?–5.7)
HCT VFR BLD AUTO: 37.7 %
HGB BLD-MCNC: 11.1 G/DL
MCH RBC QN AUTO: 27.8 PG (ref 26–34)
MCHC RBC AUTO-ENTMCNC: 29.4 G/DL (ref 31–37)
MCV RBC AUTO: 94.3 FL
PLATELET # BLD AUTO: 193 10(3)UL (ref 150–450)
RBC # BLD AUTO: 4 X10(6)UL
TSI SER-ACNC: 1.34 MIU/ML (ref 0.55–4.78)
WBC # BLD AUTO: 12.3 X10(3) UL (ref 4–11)

## 2024-05-23 PROCEDURE — 85027 COMPLETE CBC AUTOMATED: CPT

## 2024-05-23 PROCEDURE — 82728 ASSAY OF FERRITIN: CPT

## 2024-05-23 PROCEDURE — 36415 COLL VENOUS BLD VENIPUNCTURE: CPT

## 2024-05-23 PROCEDURE — 83036 HEMOGLOBIN GLYCOSYLATED A1C: CPT

## 2024-05-23 PROCEDURE — 84443 ASSAY THYROID STIM HORMONE: CPT

## 2024-05-24 ENCOUNTER — ROUTINE PRENATAL (OUTPATIENT)
Dept: OBGYN CLINIC | Facility: CLINIC | Age: 29
End: 2024-05-24

## 2024-05-24 ENCOUNTER — TELEPHONE (OUTPATIENT)
Dept: OBGYN CLINIC | Facility: CLINIC | Age: 29
End: 2024-05-24

## 2024-05-24 VITALS
DIASTOLIC BLOOD PRESSURE: 71 MMHG | WEIGHT: 173.38 LBS | HEART RATE: 121 BPM | SYSTOLIC BLOOD PRESSURE: 111 MMHG | BODY MASS INDEX: 33 KG/M2

## 2024-05-24 DIAGNOSIS — Z34.91 ENCOUNTER FOR SUPERVISION OF NORMAL PREGNANCY IN FIRST TRIMESTER, UNSPECIFIED GRAVIDITY (HCC): Primary | ICD-10-CM

## 2024-05-24 LAB
APPEARANCE: CLEAR
BILIRUBIN: NEGATIVE
GLUCOSE (URINE DIPSTICK): 100 MG/DL
LEUKOCYTES: NEGATIVE
MULTISTIX LOT#: ABNORMAL NUMERIC
NITRITE, URINE: NEGATIVE
OCCULT BLOOD: NEGATIVE
PH, URINE: 7 (ref 4.5–8)
PROTEIN (URINE DIPSTICK): NEGATIVE MG/DL
SPECIFIC GRAVITY: 1.01 (ref 1–1.03)
URINE-COLOR: YELLOW
UROBILINOGEN,SEMI-QN: 0.2 MG/DL (ref 0–1.9)

## 2024-05-24 PROCEDURE — 99213 OFFICE O/P EST LOW 20 MIN: CPT | Performed by: OBSTETRICS & GYNECOLOGY

## 2024-05-24 PROCEDURE — 81002 URINALYSIS NONAUTO W/O SCOPE: CPT | Performed by: OBSTETRICS & GYNECOLOGY

## 2024-05-24 NOTE — TELEPHONE ENCOUNTER
Patient needs to be seen week of 6/3 for her 36 week ob appointment. Only EMB is available, There are no other slots open. I did schedule the rest of her appointments for her.

## 2024-05-29 ENCOUNTER — TELEPHONE (OUTPATIENT)
Dept: OBGYN CLINIC | Facility: CLINIC | Age: 29
End: 2024-05-29

## 2024-05-29 NOTE — TELEPHONE ENCOUNTER
FMLA forms received from [employer] and placed at  to be sent to Forms department.

## 2024-05-31 NOTE — TELEPHONE ENCOUNTER
Family Medical Leave Act forms received in the forms dept.    No valid auth on file, GoHome message sent to patient.    Logged for processing.

## 2024-06-03 NOTE — TELEPHONE ENCOUNTER
Dr. Maier,     *The ACKNOWLEDGE button has been moved to the top right ribbon*    Please sign off on form if you agree to: Family Medical Leave Act for maternity leave  (place your signature on the first page only)    -From your Inbasket, Highlight the patient and click Chart   -Double click the 5/29/24 Forms Completion telephone encounter  -Scroll down to the Media section   -Click the blue Hyperlink: Berta Summers Page 6/3/24  -Click Acknowledge located in the top right ribbon/menu   -Drag the mouse into the blank space of the document and a + sign will appear. Left click to   electronically sign the document.     Thank you,    Domenica ROSAS

## 2024-06-04 NOTE — TELEPHONE ENCOUNTER
Patient called requesting assistance with Release of Information.  Patient will upload to [employer].

## 2024-06-06 ENCOUNTER — ROUTINE PRENATAL (OUTPATIENT)
Dept: OBGYN CLINIC | Facility: CLINIC | Age: 29
End: 2024-06-06

## 2024-06-06 ENCOUNTER — LAB ENCOUNTER (OUTPATIENT)
Dept: LAB | Age: 29
End: 2024-06-06
Attending: OBSTETRICS & GYNECOLOGY
Payer: MEDICAID

## 2024-06-06 VITALS
SYSTOLIC BLOOD PRESSURE: 106 MMHG | HEART RATE: 121 BPM | BODY MASS INDEX: 33 KG/M2 | WEIGHT: 177.19 LBS | DIASTOLIC BLOOD PRESSURE: 72 MMHG

## 2024-06-06 DIAGNOSIS — Z34.93 ENCOUNTER FOR SUPERVISION OF NORMAL PREGNANCY IN THIRD TRIMESTER, UNSPECIFIED GRAVIDITY (HCC): Primary | ICD-10-CM

## 2024-06-06 DIAGNOSIS — Z34.91 ENCOUNTER FOR SUPERVISION OF NORMAL PREGNANCY IN FIRST TRIMESTER, UNSPECIFIED GRAVIDITY (HCC): ICD-10-CM

## 2024-06-06 LAB
BILIRUBIN: NEGATIVE
ERYTHROCYTE [DISTWIDTH] IN BLOOD BY AUTOMATED COUNT: 16.5 %
GLUCOSE (URINE DIPSTICK): >=1000 MG/DL
HCT VFR BLD AUTO: 36.3 %
HGB BLD-MCNC: 11.4 G/DL
KETONES (URINE DIPSTICK): NEGATIVE MG/DL
MCH RBC QN AUTO: 28.1 PG (ref 26–34)
MCHC RBC AUTO-ENTMCNC: 31.4 G/DL (ref 31–37)
MCV RBC AUTO: 89.6 FL
MULTISTIX LOT#: ABNORMAL NUMERIC
NITRITE, URINE: NEGATIVE
OCCULT BLOOD: NEGATIVE
PH, URINE: 6 (ref 4.5–8)
PLATELET # BLD AUTO: 191 10(3)UL (ref 150–450)
RBC # BLD AUTO: 4.05 X10(6)UL
SPECIFIC GRAVITY: 1.02 (ref 1–1.03)
T PALLIDUM AB SER QL IA: NONREACTIVE
UROBILINOGEN,SEMI-QN: 0.2 MG/DL (ref 0–1.9)
WBC # BLD AUTO: 8.6 X10(3) UL (ref 4–11)

## 2024-06-06 PROCEDURE — 86780 TREPONEMA PALLIDUM: CPT

## 2024-06-06 PROCEDURE — 99213 OFFICE O/P EST LOW 20 MIN: CPT | Performed by: OBSTETRICS & GYNECOLOGY

## 2024-06-06 PROCEDURE — 87389 HIV-1 AG W/HIV-1&-2 AB AG IA: CPT

## 2024-06-06 PROCEDURE — 85027 COMPLETE CBC AUTOMATED: CPT

## 2024-06-06 PROCEDURE — 36415 COLL VENOUS BLD VENIPUNCTURE: CPT

## 2024-06-06 PROCEDURE — 81002 URINALYSIS NONAUTO W/O SCOPE: CPT | Performed by: OBSTETRICS & GYNECOLOGY

## 2024-06-06 NOTE — PROGRESS NOTES
Feeling well.  Good fetal movement.  Group beta strep collected.  Normal 3T labs.  Has labor instructions.  RTC 1 week.

## 2024-06-07 LAB — GROUP B STREP BY PCR FOR PCR OVT: NEGATIVE

## 2024-06-12 ENCOUNTER — ROUTINE PRENATAL (OUTPATIENT)
Dept: OBGYN CLINIC | Facility: CLINIC | Age: 29
End: 2024-06-12

## 2024-06-12 VITALS
BODY MASS INDEX: 34 KG/M2 | WEIGHT: 180.19 LBS | HEART RATE: 118 BPM | SYSTOLIC BLOOD PRESSURE: 114 MMHG | DIASTOLIC BLOOD PRESSURE: 75 MMHG

## 2024-06-12 DIAGNOSIS — Z34.93 ENCOUNTER FOR SUPERVISION OF NORMAL PREGNANCY IN THIRD TRIMESTER, UNSPECIFIED GRAVIDITY (HCC): Primary | ICD-10-CM

## 2024-06-12 LAB
APPEARANCE: CLEAR
BILIRUBIN: NEGATIVE
GLUCOSE (URINE DIPSTICK): 100 MG/DL
KETONES (URINE DIPSTICK): NEGATIVE MG/DL
LEUKOCYTES: NEGATIVE
MULTISTIX LOT#: ABNORMAL NUMERIC
NITRITE, URINE: NEGATIVE
OCCULT BLOOD: NEGATIVE
PH, URINE: 6.5 (ref 4.5–8)
SPECIFIC GRAVITY: 1 (ref 1–1.03)
URINE-COLOR: YELLOW
UROBILINOGEN,SEMI-QN: 0.2 MG/DL (ref 0–1.9)

## 2024-06-12 PROCEDURE — 81002 URINALYSIS NONAUTO W/O SCOPE: CPT | Performed by: OBSTETRICS & GYNECOLOGY

## 2024-06-12 PROCEDURE — 99213 OFFICE O/P EST LOW 20 MIN: CPT | Performed by: OBSTETRICS & GYNECOLOGY

## 2024-06-13 NOTE — PROGRESS NOTES
Pieter and Dave at visit. Continues to have glucosuria but passed 3 HR GTT in early April and normal A1c in May. Has labor instructions. Asked about IOL in a week. She has no cervical change and we wouldn't consider unless advanced dilation after 39.

## 2024-06-21 ENCOUNTER — ROUTINE PRENATAL (OUTPATIENT)
Dept: OBGYN CLINIC | Facility: CLINIC | Age: 29
End: 2024-06-21

## 2024-06-21 VITALS
BODY MASS INDEX: 34 KG/M2 | DIASTOLIC BLOOD PRESSURE: 70 MMHG | WEIGHT: 182 LBS | SYSTOLIC BLOOD PRESSURE: 105 MMHG | HEART RATE: 105 BPM

## 2024-06-21 DIAGNOSIS — Z34.91 ENCOUNTER FOR SUPERVISION OF NORMAL PREGNANCY IN FIRST TRIMESTER, UNSPECIFIED GRAVIDITY (HCC): Primary | ICD-10-CM

## 2024-06-21 LAB
APPEARANCE: CLEAR
BILIRUBIN: NEGATIVE
GLUCOSE (URINE DIPSTICK): NEGATIVE MG/DL
LEUKOCYTES: NEGATIVE
MULTISTIX LOT#: NORMAL NUMERIC
NITRITE, URINE: NEGATIVE
OCCULT BLOOD: NEGATIVE
PH, URINE: 7 (ref 4.5–8)
PROTEIN (URINE DIPSTICK): NEGATIVE MG/DL
SPECIFIC GRAVITY: 1.01 (ref 1–1.03)
URINE-COLOR: YELLOW
UROBILINOGEN,SEMI-QN: 0.2 MG/DL (ref 0–1.9)

## 2024-06-21 PROCEDURE — 81002 URINALYSIS NONAUTO W/O SCOPE: CPT | Performed by: OBSTETRICS & GYNECOLOGY

## 2024-06-21 PROCEDURE — 99212 OFFICE O/P EST SF 10 MIN: CPT | Performed by: OBSTETRICS & GYNECOLOGY

## 2024-06-25 ENCOUNTER — ANESTHESIA EVENT (OUTPATIENT)
Dept: OBGYN UNIT | Facility: HOSPITAL | Age: 29
End: 2024-06-25
Payer: MEDICAID

## 2024-06-25 ENCOUNTER — ANESTHESIA (OUTPATIENT)
Dept: OBGYN UNIT | Facility: HOSPITAL | Age: 29
End: 2024-06-25
Payer: MEDICAID

## 2024-06-25 ENCOUNTER — HOSPITAL ENCOUNTER (INPATIENT)
Facility: HOSPITAL | Age: 29
LOS: 2 days | Discharge: HOME | End: 2024-06-27
Attending: OBSTETRICS & GYNECOLOGY | Admitting: OBSTETRICS & GYNECOLOGY
Payer: MEDICAID

## 2024-06-25 LAB
ANTIBODY SCREEN: NEGATIVE
BASOPHILS # BLD AUTO: 0.03 X10(3) UL (ref 0–0.2)
BASOPHILS NFR BLD AUTO: 0.3 %
DEPRECATED RDW RBC AUTO: 54.1 FL (ref 35.1–46.3)
EOSINOPHIL # BLD AUTO: 0.04 X10(3) UL (ref 0–0.7)
EOSINOPHIL NFR BLD AUTO: 0.4 %
ERYTHROCYTE [DISTWIDTH] IN BLOOD BY AUTOMATED COUNT: 16.6 % (ref 11–15)
HCT VFR BLD AUTO: 35.7 %
HGB BLD-MCNC: 11.4 G/DL
IMM GRANULOCYTES # BLD AUTO: 0.17 X10(3) UL (ref 0–1)
IMM GRANULOCYTES NFR BLD: 1.8 %
LYMPHOCYTES # BLD AUTO: 1.12 X10(3) UL (ref 1–4)
LYMPHOCYTES NFR BLD AUTO: 11.8 %
MCH RBC QN AUTO: 28.6 PG (ref 26–34)
MCHC RBC AUTO-ENTMCNC: 31.9 G/DL (ref 31–37)
MCV RBC AUTO: 89.5 FL
MONOCYTES # BLD AUTO: 0.65 X10(3) UL (ref 0.1–1)
MONOCYTES NFR BLD AUTO: 6.9 %
NEUTROPHILS # BLD AUTO: 7.45 X10 (3) UL (ref 1.5–7.7)
NEUTROPHILS # BLD AUTO: 7.45 X10(3) UL (ref 1.5–7.7)
NEUTROPHILS NFR BLD AUTO: 78.8 %
PLATELET # BLD AUTO: 161 10(3)UL (ref 150–450)
RBC # BLD AUTO: 3.99 X10(6)UL
RH BLOOD TYPE: POSITIVE
T PALLIDUM AB SER QL IA: NONREACTIVE
WBC # BLD AUTO: 9.5 X10(3) UL (ref 4–11)

## 2024-06-25 PROCEDURE — 10907ZC DRAINAGE OF AMNIOTIC FLUID, THERAPEUTIC FROM PRODUCTS OF CONCEPTION, VIA NATURAL OR ARTIFICIAL OPENING: ICD-10-PCS | Performed by: OBSTETRICS & GYNECOLOGY

## 2024-06-25 PROCEDURE — 10H07YZ INSERTION OF OTHER DEVICE INTO PRODUCTS OF CONCEPTION, VIA NATURAL OR ARTIFICIAL OPENING: ICD-10-PCS | Performed by: OBSTETRICS & GYNECOLOGY

## 2024-06-25 PROCEDURE — 59409 OBSTETRICAL CARE: CPT | Performed by: OBSTETRICS & GYNECOLOGY

## 2024-06-25 RX ORDER — IBUPROFEN 600 MG/1
600 TABLET ORAL EVERY 6 HOURS PRN
Status: DISCONTINUED | OUTPATIENT
Start: 2024-06-25 | End: 2024-06-27

## 2024-06-25 RX ORDER — DOCUSATE SODIUM 100 MG/1
100 CAPSULE, LIQUID FILLED ORAL
Status: DISCONTINUED | OUTPATIENT
Start: 2024-06-25 | End: 2024-06-27

## 2024-06-25 RX ORDER — SODIUM CHLORIDE, SODIUM LACTATE, POTASSIUM CHLORIDE, CALCIUM CHLORIDE 600; 310; 30; 20 MG/100ML; MG/100ML; MG/100ML; MG/100ML
INJECTION, SOLUTION INTRAVENOUS AS NEEDED
Status: DISCONTINUED | OUTPATIENT
Start: 2024-06-25 | End: 2024-06-25 | Stop reason: HOSPADM

## 2024-06-25 RX ORDER — LIDOCAINE HYDROCHLORIDE AND EPINEPHRINE 15; 5 MG/ML; UG/ML
INJECTION, SOLUTION EPIDURAL
Status: COMPLETED | OUTPATIENT
Start: 2024-06-25 | End: 2024-06-25

## 2024-06-25 RX ORDER — IBUPROFEN 600 MG/1
600 TABLET ORAL EVERY 6 HOURS
Status: DISCONTINUED | OUTPATIENT
Start: 2024-06-25 | End: 2024-06-25

## 2024-06-25 RX ORDER — ACETAMINOPHEN 500 MG
500 TABLET ORAL EVERY 6 HOURS PRN
Status: DISCONTINUED | OUTPATIENT
Start: 2024-06-25 | End: 2024-06-27

## 2024-06-25 RX ORDER — SIMETHICONE 80 MG
80 TABLET,CHEWABLE ORAL 3 TIMES DAILY PRN
Status: DISCONTINUED | OUTPATIENT
Start: 2024-06-25 | End: 2024-06-27

## 2024-06-25 RX ORDER — BUPIVACAINE HYDROCHLORIDE 2.5 MG/ML
20 INJECTION, SOLUTION EPIDURAL; INFILTRATION; INTRACAUDAL ONCE
Status: COMPLETED | OUTPATIENT
Start: 2024-06-25 | End: 2024-06-25

## 2024-06-25 RX ORDER — TERBUTALINE SULFATE 1 MG/ML
0.25 INJECTION, SOLUTION SUBCUTANEOUS AS NEEDED
Status: COMPLETED | OUTPATIENT
Start: 2024-06-25 | End: 2024-06-25

## 2024-06-25 RX ORDER — NALBUPHINE HYDROCHLORIDE 10 MG/ML
2.5 INJECTION, SOLUTION INTRAMUSCULAR; INTRAVENOUS; SUBCUTANEOUS
Status: DISCONTINUED | OUTPATIENT
Start: 2024-06-25 | End: 2024-06-25

## 2024-06-25 RX ORDER — CITRIC ACID/SODIUM CITRATE 334-500MG
30 SOLUTION, ORAL ORAL AS NEEDED
Status: DISCONTINUED | OUTPATIENT
Start: 2024-06-25 | End: 2024-06-25 | Stop reason: HOSPADM

## 2024-06-25 RX ORDER — ONDANSETRON 2 MG/ML
4 INJECTION INTRAMUSCULAR; INTRAVENOUS EVERY 6 HOURS PRN
Status: DISCONTINUED | OUTPATIENT
Start: 2024-06-25 | End: 2024-06-27

## 2024-06-25 RX ORDER — ACETAMINOPHEN 500 MG
1000 TABLET ORAL EVERY 6 HOURS PRN
Status: DISCONTINUED | OUTPATIENT
Start: 2024-06-25 | End: 2024-06-27

## 2024-06-25 RX ORDER — ONDANSETRON 2 MG/ML
4 INJECTION INTRAMUSCULAR; INTRAVENOUS EVERY 6 HOURS PRN
Status: DISCONTINUED | OUTPATIENT
Start: 2024-06-25 | End: 2024-06-25 | Stop reason: HOSPADM

## 2024-06-25 RX ORDER — ACETAMINOPHEN 500 MG
1000 TABLET ORAL EVERY 6 HOURS PRN
Status: DISCONTINUED | OUTPATIENT
Start: 2024-06-25 | End: 2024-06-25 | Stop reason: HOSPADM

## 2024-06-25 RX ORDER — LIDOCAINE HYDROCHLORIDE 10 MG/ML
30 INJECTION, SOLUTION EPIDURAL; INFILTRATION; INTRACAUDAL; PERINEURAL ONCE
Status: DISCONTINUED | OUTPATIENT
Start: 2024-06-25 | End: 2024-06-25 | Stop reason: HOSPADM

## 2024-06-25 RX ORDER — AMMONIA INHALANTS 0.04 G/.3ML
0.3 INHALANT RESPIRATORY (INHALATION) AS NEEDED
Status: DISCONTINUED | OUTPATIENT
Start: 2024-06-25 | End: 2024-06-27

## 2024-06-25 RX ORDER — IBUPROFEN 600 MG/1
600 TABLET ORAL ONCE AS NEEDED
Status: DISCONTINUED | OUTPATIENT
Start: 2024-06-25 | End: 2024-06-25 | Stop reason: HOSPADM

## 2024-06-25 RX ORDER — DEXTROSE, SODIUM CHLORIDE, SODIUM LACTATE, POTASSIUM CHLORIDE, AND CALCIUM CHLORIDE 5; .6; .31; .03; .02 G/100ML; G/100ML; G/100ML; G/100ML; G/100ML
INJECTION, SOLUTION INTRAVENOUS CONTINUOUS
Status: DISCONTINUED | OUTPATIENT
Start: 2024-06-25 | End: 2024-06-25 | Stop reason: HOSPADM

## 2024-06-25 RX ORDER — BUPIVACAINE HCL/0.9 % NACL/PF 0.25 %
5 PLASTIC BAG, INJECTION (ML) EPIDURAL AS NEEDED
Status: DISCONTINUED | OUTPATIENT
Start: 2024-06-25 | End: 2024-06-25

## 2024-06-25 RX ORDER — BENZOCAINE/MENTHOL 6 MG-10 MG
1 LOZENGE MUCOUS MEMBRANE EVERY 6 HOURS PRN
Status: DISCONTINUED | OUTPATIENT
Start: 2024-06-25 | End: 2024-06-27

## 2024-06-25 RX ORDER — BISACODYL 10 MG
10 SUPPOSITORY, RECTAL RECTAL ONCE AS NEEDED
Status: DISCONTINUED | OUTPATIENT
Start: 2024-06-25 | End: 2024-06-27

## 2024-06-25 RX ORDER — ACETAMINOPHEN 500 MG
500 TABLET ORAL EVERY 6 HOURS PRN
Status: DISCONTINUED | OUTPATIENT
Start: 2024-06-25 | End: 2024-06-25 | Stop reason: HOSPADM

## 2024-06-25 RX ORDER — SODIUM CHLORIDE 9 MG/ML
INJECTION, SOLUTION INTRAVENOUS ONCE
Status: DISCONTINUED | OUTPATIENT
Start: 2024-06-25 | End: 2024-06-25

## 2024-06-25 RX ADMIN — LIDOCAINE HYDROCHLORIDE AND EPINEPHRINE 5 ML: 15; 5 INJECTION, SOLUTION EPIDURAL at 17:00:00

## 2024-06-25 NOTE — PROGRESS NOTES
Pt is a 28 year old female admitted to R8/R8-A.     Chief Complaint   Patient presents with    R/o Labor     Cxs since 1030am      Pt is  38w6d intra-uterine pregnancy.  History obtained, consents signed. Oriented to room, staff, and plan of care.

## 2024-06-25 NOTE — ANESTHESIA PROCEDURE NOTES
Epidural Block    Date/Time: 6/25/2024 5:00 PM    Performed by: Jorge Arevalo MD, PhD  Authorized by: Jorge Arevalo MD, PhD    General Information and Staff    Start Time:  6/25/2024 5:00 PM  End Time:  6/25/2024 5:15 PM  Anesthesiologist:  Jorge Arevalo MD, PhD  Performed by:  Anesthesiologist  Reason for Block: procedure for pain and labor epidural    Preanesthetic Checklist: patient identified, IV checked, risks and benefits discussed, surgical consent, monitors and equipment checked, pre-op evaluation, timeout performed and anesthesia consent      Procedure Details    Patient Position:  Sitting  Prep: ChloraPrep    Approach:  Midline  Location:  L 3-4  Injection Technique:  CRUZ saline    Needle and Epidural Catheter    Needle Type:  Tuohy  Needle Gauge:  18 G  Needle Length:  3.5 in  CRUZ Depth:  7  Catheter Type:  End hole  Catheter Size:  20 G  Catheter at Skin Depth:  12  Test Dose:  Negative    Assessment    Sensory Level:  T10    Additional Comments

## 2024-06-25 NOTE — ANESTHESIA PREPROCEDURE EVALUATION
Anesthesia PreOp Note    HPI:     Don Wu is a 28 year old female who presents for preoperative consultation requested by: * No surgeons listed *    Date of Surgery: 6/25/2024    * No procedures listed *  Indication: * No pre-op diagnosis entered *    Relevant Problems   No relevant active problems       NPO:                         History Review:  Patient Active Problem List    Diagnosis Date Noted    Tachycardia with heart rate 121-140 beats per minute 04/22/2024    Anemia complicating pregnancy, third trimester ([facility]) 04/04/2024    Fetal arrhythmia affecting pregnancy, antepartum ([facility]) 02/20/2024    Genetic screening 01/03/2024    Hyperemesis gravidarum ([facility]) 12/14/2023    Hypokalemia 12/14/2023    Pregnancy ([facility]) 11/08/2021    Nausea/vomiting in pregnancy ([facility]) 06/02/2021       Past Medical History:    Anemia    Decorative tattoo    Dysmenorrhea    1st 2 days of menses    History of chlamydia    [age]yo    History of gonorrhea    [age]yo    Migraine headache    since age 9    UTI (urinary tract infection)       History reviewed. No pertinent surgical history.    Medications Prior to Admission   Medication Sig Dispense Refill Last Dose    Ferrous Gluconate-C-Folic Acid (IRON-C OR) Take by mouth.   6/24/2024    prenatal vitamin with DHA 27-0.8-228 MG Oral Cap Take 1 capsule by mouth daily.   6/24/2024     Current Facility-Administered Medications Ordered in Epic   Medication Dose Route Frequency Provider Last Rate Last Admin    dextrose in lactated ringers 5% infusion   Intravenous Continuous Charlotte Jimenez MD        lactated ringers infusion   Intravenous PRN Charlotte Jimenez MD        lactated ringers IV bolus 500 mL  500 mL Intravenous PRN Charlotte Jimenez MD        acetaminophen (Tylenol Extra Strength) tab 500 mg  500 mg Oral Q6H PRN Charlotte Jimenez MD        acetaminophen (Tylenol Extra Strength) tab 1,000 mg  1,000 mg Oral Q6H PRN Charlotte Jimenez MD        ibuprofen (Motrin) tab 600 mg  600 mg  Oral Once PRN Charlotte Jimenez MD        ondansetron (Zofran) 4 MG/2ML injection 4 mg  4 mg Intravenous Q6H PRN Charlotte Jimenez MD        oxyTOCIN in sodium chloride 0.9% (Pitocin) 30 Units/500mL infusion premix  62.5-900 willy-units/min Intravenous Continuous Charlotte Jimenez MD        terbutaline (Brethine) 1 MG/ML injection 0.25 mg  0.25 mg Subcutaneous PRN Charlotte Jimenez MD        sodium citrate-citric acid (Bicitra) 500-334 MG/5ML oral solution 30 mL  30 mL Oral PRN Charlotte Jimenez MD        lidocaine PF (Xylocaine-MPF) 1% injection  30 mL Intradermal Once Charlotte Jimenez MD        lactated ringers IV bolus 1,000 mL  1,000 mL Intravenous Once Dustin Parham MD        fentaNYL-bupivacaine 2 mcg/mL-0.125% in sodium chloride 0.9% 100 mL EPIDURAL infusion premix   Epidural Continuous Dustin Parham MD        fentaNYL (Sublimaze) 50 mcg/mL injection 100 mcg  100 mcg Epidural Once Dustin Parham MD        bupivacaine PF (Marcaine) 0.25% injection  20 mL Epidural Once Dustin Parahm MD        EPHEDrine (PF) 25 MG/5 ML injection 5 mg  5 mg Intravenous PRN Dustin Parham MD   10 mg at 06/25/24 1727    nalbuphine (Nubain) 10 mg/mL injection 2.5 mg  2.5 mg Intravenous Q15 Min PRN Dustin Parham MD         No current [facility]-ordered outpatient medications on file.       Allergies   Allergen Reactions    Amoxicillin HIVES and RASH       Family History   Problem Relation Age of Onset    Other (Other) Father         overdose    High Blood Pressure Mother     Cancer Paternal Grandmother         breast ca    Other (Other) Brother         asthma    Other (Other) Maternal Great-Grandmother         aneurysm     Social History     Socioeconomic History    Marital status: Single     Spouse name: Rosie Causey    Number of children: 0    Years of education: 13   Tobacco Use    Smoking status: Never    Smokeless tobacco: Never   Vaping Use    Vaping status: Never Used   Substance and Sexual Activity    Alcohol  use: Not Currently     Comment: rare    Drug use: Not Currently     Types: Cannabis     Comment: daily prior to pregnancy    Sexual activity: Yes     Partners: Male     Birth control/protection: Condom     Comment: lifetime partners 12   Other Topics Concern    Blood Transfusions No    Caffeine Concern No    Occupational Exposure No    Weight Concern No    Special Diet No     Comment: does not eat eggs    Exercise No    Seat Belt Yes       Available pre-op labs reviewed.  Lab Results   Component Value Date    WBC 9.5 06/25/2024    RBC 3.99 06/25/2024    HGB 11.4 (L) 06/25/2024    HCT 35.7 06/25/2024    MCV 89.5 06/25/2024    MCH 28.6 06/25/2024    MCHC 31.9 06/25/2024    RDW 16.6 (H) 06/25/2024    .0 06/25/2024             Vital Signs:  There is no height or weight on file to calculate BMI.   blood pressure is 100/66 and her pulse is 106.   Vitals:    06/25/24 1600   BP: 100/66   Pulse: 106        Anesthesia Evaluation     Patient summary reviewed and Nursing notes reviewed    Airway   Mallampati: III  Dental - Dentition appears grossly intact     Pulmonary - negative ROS and normal exam   Cardiovascular - negative ROS and normal exam  Exercise tolerance: good    Neuro/Psych - negative ROS     GI/Hepatic/Renal - negative ROS     Endo/Other - negative ROS   Abdominal  - normal exam                 Anesthesia Plan:   ASA:  2  Plan:   Epidural  Informed Consent Plan and Risks Discussed With:  Patient      I have informed Don Wu and/or legal guardian or family member of the nature of the anesthetic plan, benefits, risks including possible dental damage if relevant, major complications, and any alternative forms of anesthetic management.   All of the patient's questions were answered to the best of my ability. The patient desires the anesthetic management as planned.  Jorge Arevalo MD, PhD  6/25/2024 5:42 PM  Present on Admission:  **None**

## 2024-06-25 NOTE — H&P
[facility]  part of [facility]    History & Physical    Don Wu Patient Status:  Inpatient    10/4/1995 MRN [id number]   Location [facility] Attending Charlotte Jimenez MD   Hosp Day # 0 PCP Keily Child DO     Date of Admission:  2024    SUBJECTIVE:    Don Wu is a 28 year old  at 38w6d with 7/3/2024, Alternate ALONSO Entry who is being admitted for labor management. Patient reports contractions since morning  .   Fetal Movement: normal.     Problem List:   Patient Active Problem List    Diagnosis    Tachycardia with heart rate 121-140 beats per minute     2024: Presistent at every visit -- see cardiology      Anemia complicating pregnancy, third trimester (HCC)     Recent Labs     23  1144 12/15/23  0455 24  0953   RBC 4.27 3.87 3.59*   HGB 13.0 11.3* 10.6*   HCT 36.7 34.1* 33.1*   MCV 85.9 88.1 92.2   MCH 30.4 29.2 29.5   MCHC 35.4 33.1 32.0   RDW 13.2 13.5 13.5   NEPRELIM 6.56 7.96*  --    WBC 7.9 8.5 11.8*   .0 223.0 239.0           If Hb less than 11, start iron & then recheck CBC/ferritin in one month    Decreased MCV, try iron x one month, repeat CBC.  If not improved significantly, then iron studies (ferritin, TIBC), Hb electorphoresis    Normal MCV: Hb electrophoresis    Increased MCV: check for vit B12, folate    If Hb<8, MFM & heme consults and consider transfusion     24  Begin Slow Fe and repeat CBC along with a ferritin in 1 month.       Fetal arrhythmia affecting pregnancy, antepartum (HCC)     28  MFM consult and normal level 2 with normal rhythm.  PLAN: Follow up if irregular FHTs noted again     24  Normal level 1 but radiologist states \" transient fetal arrhythmia \". Reading radiologist out of office and I spoke with Dr Maximus Sierra. He reviewed cine-loop and confirmed irregularity with pause and possibly transient bradycardia. Discussed with patient on the . We'll ask  for MFM consult / US.       Genetic screening     23  Low risk girl      Hyperemesis gravidarum (HCC)    Hypokalemia    Pregnancy (HCC)     Desires genetic testing      Nausea/vomiting in pregnancy (HCC)     2024: Resolved  2023: tried multiple different meds. Zofran and pepcid currently       Obstetric History:   OB History    Para Term  AB Living   2 1 1 0 0 1   SAB IAB Ectopic Multiple Live Births   0 0 0   1      # Outcome Date GA Lbr Sunny/2nd Weight Sex Type Anes PTL Lv   2 Current            1 Term 21 38w1d 10:00 / 00:31 6 lb 14.1 oz (3.12 kg) M NORMAL SPONT EPI N MARC      Obstetric Comments   Sexually active since age 17     Past Medical History:   Past Medical History:    Anemia    Decorative tattoo    Dysmenorrhea    1st 2 days of menses    History of chlamydia    [age]yo    History of gonorrhea    [age]yo    Migraine headache    since age 9    UTI (urinary tract infection)     Past Social History: History reviewed. No pertinent surgical history.  Family History:   Family History   Problem Relation Age of Onset    Other (Other) Father         overdose    High Blood Pressure Mother     Cancer Paternal Grandmother         breast ca    Other (Other) Brother         asthma    Other (Other) Maternal Great-Grandmother         aneurysm     Social History:   Social History     Tobacco Use    Smoking status: Never    Smokeless tobacco: Never   Substance Use Topics    Alcohol use: Not Currently     Comment: rare       Home Meds:   Medications Prior to Admission   Medication Sig Dispense Refill Last Dose    Ferrous Gluconate-C-Folic Acid (IRON-C OR) Take by mouth.   2024    prenatal vitamin with DHA 27-0.8-228 MG Oral Cap Take 1 capsule by mouth daily.   2024     Allergies:   Allergies   Allergen Reactions    Amoxicillin HIVES and RASH       OBJECTIVE:    Temp:  [98 °F (36.7 °C)] 98 °F (36.7 °C)  Pulse:  [106-121] 121  Resp:  [16] 16  BP: (100-129)/(49-79) 129/75  SpO2:  [99  %-100 %] 100 %    General: Alert and Oriented  Abdomen: Soft, Gravid  Leopolds: vertex    Cervix 5/70/-2 on admission. Patient evaluated by myself when recurrent variable decelerations present. Prior to my exam she had AROM and FSE placed by in-house MD. I placed IUPC and amnioinfusion started. Dose of Terbutaline administered and decelerations beginning to resolve.        150 BPM, Fetal heart variability: moderate and reactive  Decelerations: variables  Contractions: regular    Lab Review:  Results for orders placed or performed during the hospital encounter of 24   CBC W/ DIFFERENTIAL   Result Value Ref Range    WBC 9.5 4.0 - 11.0 x10(3) uL    RBC 3.99 3.80 - 5.30 x10(6)uL    HGB 11.4 (L) 12.0 - 16.0 g/dL    HCT 35.7 35.0 - 48.0 %    MCV 89.5 80.0 - 100.0 fL    MCH 28.6 26.0 - 34.0 pg    MCHC 31.9 31.0 - 37.0 g/dL    RDW-SD 54.1 (H) 35.1 - 46.3 fL    RDW 16.6 (H) 11.0 - 15.0 %    .0 150.0 - 450.0 10(3)uL    Neutrophil Absolute Prelim 7.45 1.50 - 7.70 x10 (3) uL    Neutrophil Absolute 7.45 1.50 - 7.70 x10(3) uL    Lymphocyte Absolute 1.12 1.00 - 4.00 x10(3) uL    Monocyte Absolute 0.65 0.10 - 1.00 x10(3) uL    Eosinophil Absolute 0.04 0.00 - 0.70 x10(3) uL    Basophil Absolute 0.03 0.00 - 0.20 x10(3) uL    Immature Granulocyte Absolute 0.17 0.00 - 1.00 x10(3) uL    Neutrophil % 78.8 %    Lymphocyte % 11.8 %    Monocyte % 6.9 %    Eosinophil % 0.4 %    Basophil % 0.3 %    Immature Granulocyte % 1.8 %        ASSESSMENT:    Patient is a  at 38w6d  Patient Active Problem List   Diagnosis    Nausea/vomiting in pregnancy (HCC)    Pregnancy (HCC)    Hyperemesis gravidarum (HCC)    Hypokalemia    Genetic screening    Fetal arrhythmia affecting pregnancy, antepartum (HCC)    Anemia complicating pregnancy, third trimester (HCC)    Tachycardia with heart rate 121-140 beats per minute         PLAN:    Admit  Cefm/toco  FHTs reassuring   GBS negative  IUPC placed and amnioinfusion started  Goal: ; we did  discuss that if persistent FHT decelerations then recommendation would be  section      Charlotte Jimenez MD  2024  5:57 PM

## 2024-06-25 NOTE — PROGRESS NOTES
[facility]  part of [facility]    Labor Progress Note    Don Wu Patient Status:  Inpatient    10/4/1995 MRN [id number]   Location [facility] Attending Charlotte Jimenez MD   Hosp Day # 0 PCP Keily Child DO       Subjective   Interval History:     Called to patient room as in house, due to variable decelerations.    Pt had just received an epidural.  Pt currently comfortable with epidural.    BP was low.      Objective   Vital signs in last 24 hours:  Pulse:  [106-121] 121  BP: (100-129)/(49-79) 129/75  SpO2:  [99 %-100 %] 100 %    Input/Output:  No intake or output data in the 24 hours ending 24 1744    General: comfortable due to epidural    FHT assessment:   Baseline: 150 bpm   Variability: moderate   Accels:  present   Decels: variables   Tocos:  contractions every 2 minute   Category: 2 tracing    Sterile vaginal exam:  Dilation:  6    Effacement: 70%  Station: -2   Position: Cephalic  AROM with clear fluid and FSM placed for better assessment of contractions.    Results:         Assessment/Plan   IUP at 38w6d withActive phase labor.  Plan for amniorhexis  Plan discussed with patient who verbalizes understanding and agreement.    IUP at 38 6/7 wks  FCA category 2  Labor: expectant management; AROM, FSM placed for better assessment of contractions. Epidural in place for pain management.  Hypotensive: likely from epidural and likely cause of variables, ephedrine given.  CPM    Giselle Berrios MD  2024

## 2024-06-26 LAB
BASOPHILS # BLD AUTO: 0.04 X10(3) UL (ref 0–0.2)
BASOPHILS NFR BLD AUTO: 0.3 %
DEPRECATED RDW RBC AUTO: 54.7 FL (ref 35.1–46.3)
EOSINOPHIL # BLD AUTO: 0.09 X10(3) UL (ref 0–0.7)
EOSINOPHIL NFR BLD AUTO: 0.6 %
ERYTHROCYTE [DISTWIDTH] IN BLOOD BY AUTOMATED COUNT: 16.7 % (ref 11–15)
HCT VFR BLD AUTO: 33.6 %
HGB BLD-MCNC: 10.6 G/DL
IMM GRANULOCYTES # BLD AUTO: 0.13 X10(3) UL (ref 0–1)
IMM GRANULOCYTES NFR BLD: 0.9 %
LYMPHOCYTES # BLD AUTO: 1.35 X10(3) UL (ref 1–4)
LYMPHOCYTES NFR BLD AUTO: 9.2 %
MCH RBC QN AUTO: 28.5 PG (ref 26–34)
MCHC RBC AUTO-ENTMCNC: 31.5 G/DL (ref 31–37)
MCV RBC AUTO: 90.3 FL
MONOCYTES # BLD AUTO: 1.13 X10(3) UL (ref 0.1–1)
MONOCYTES NFR BLD AUTO: 7.7 %
NEUTROPHILS # BLD AUTO: 11.95 X10 (3) UL (ref 1.5–7.7)
NEUTROPHILS # BLD AUTO: 11.95 X10(3) UL (ref 1.5–7.7)
NEUTROPHILS NFR BLD AUTO: 81.3 %
PLATELET # BLD AUTO: 177 10(3)UL (ref 150–450)
RBC # BLD AUTO: 3.72 X10(6)UL
WBC # BLD AUTO: 14.7 X10(3) UL (ref 4–11)

## 2024-06-26 NOTE — PROGRESS NOTES
Patient up to bathroom with assist x 2.  Voided 600mL at this time. Patient transferred to mother/baby room 369 per wheelchair in stable condition with baby and personal belongings.  Accompanied by significant other and staff.  Report given to mother/baby charge PATRIA Juarez.

## 2024-06-26 NOTE — CM/SW NOTE
SW self referral due to finances/WIC resources    SW met with patient and  FOB  bedside.  SW confirmed face sheet contact as correct.    Baby boy/girl name:Baby girl: Giovani  Date & time of delivery:6/25/24 @ 7:14pm  Delivery method:Normal spontaneous vaginal delivery   Siblings age:2 yr old    Patient employed: Yes  Length of maternity leave:12 weeks    Father of baby employed:Yes  Length of paternity leave: 2 weeks    Breast or formula feed:Breast and formula feed    Pediatrician: Dr. Francisco RAND encouraged pt to schedule infant first appointment (usually within 48 hours of discharge) prior to pt discharge. Pt expressed understanding.     Infant Insurance:Medicaid  [employer] [facility] contacted:Yes    Mental Health History: Pt denied hx of depression and anxiety    Medications:n/a    Therapist:n/a    Psychiatrist:n/a    SW discussed signs, symptoms and risks associated with post partum depression & anxiety.  SW provided pt with PMAD resources.  Other resources provided: [employer] transportation and mental health resources.  Pt endorses she is current w/WIC services and was encouraged to contact them informing of infants birth.  Pt expressed understanding.     Patient support system:Pt endorses her extended family as her support system    Patient denied current questions/needs from SW.    SW/CM to remain available for support and/or discharge planning.      Chikis DUNCAN MSW, LSW  Social Work   Ext:#[phone]

## 2024-06-26 NOTE — PROGRESS NOTES
[facility]  part of [facility]    OB/Gyne Post  Progress Note      Don Wu Patient Status:  Inpatient    10/4/1995 MRN [id number]   Location [facility] Attending Charlotte Jimenez MD   Hosp Day # 1 PCP Keily Child, DO       Subjective     Good pain control. Tolerating present diet. Ambulating well. Voiding freely.  She denies headache, fever, chest pain, shortness of breath, dizziness upon ambulation nor leg pain.     Objective   Vital signs in last 24 hours:  Vitals:    24 2145 24 0020 24 0449 24 0859   BP: 99/72 94/57 114/67 100/61   Pulse: 100 101 100 101   Resp: 18 18 18 18   Temp: 98.7 °F (37.1 °C) 98.5 °F (36.9 °C) 98 °F (36.7 °C) 98.1 °F (36.7 °C)   TempSrc: Oral Oral Oral Oral   SpO2:            Input/Output:    Intake/Output Summary (Last 24 hours) at 2024 0912  Last data filed at 2024 2230  Gross per 24 hour   Intake 1031.25 ml   Output 1809 ml   Net -777.75 ml       AVSS  Constitutional: comfortable  Abdomen: soft nontender  Uterus: fundus below umbilicus, appropriately tender  Extremities: No calf tenderness, SCDs on while in bed, 1+ pitting edema.      Results:   Labs / Path / Radiology:    Recent Results (from the past 24 hour(s))   T Pallidum Screening Cascade    Collection Time: 24  4:17 PM   Result Value Ref Range    Treponemal Antibodies Nonreactive Nonreactive    CBC W/ DIFFERENTIAL    Collection Time: 24  4:17 PM   Result Value Ref Range    WBC 9.5 4.0 - 11.0 x10(3) uL    RBC 3.99 3.80 - 5.30 x10(6)uL    HGB 11.4 (L) 12.0 - 16.0 g/dL    HCT 35.7 35.0 - 48.0 %    MCV 89.5 80.0 - 100.0 fL    MCH 28.6 26.0 - 34.0 pg    MCHC 31.9 31.0 - 37.0 g/dL    RDW-SD 54.1 (H) 35.1 - 46.3 fL    RDW 16.6 (H) 11.0 - 15.0 %    .0 150.0 - 450.0 10(3)uL    Neutrophil Absolute Prelim 7.45 1.50 - 7.70 x10 (3) uL    Neutrophil Absolute 7.45 1.50 - 7.70 x10(3) uL    Lymphocyte Absolute 1.12 1.00 - 4.00 x10(3) uL     Monocyte Absolute 0.65 0.10 - 1.00 x10(3) uL    Eosinophil Absolute 0.04 0.00 - 0.70 x10(3) uL    Basophil Absolute 0.03 0.00 - 0.20 x10(3) uL    Immature Granulocyte Absolute 0.17 0.00 - 1.00 x10(3) uL    Neutrophil % 78.8 %    Lymphocyte % 11.8 %    Monocyte % 6.9 %    Eosinophil % 0.4 %    Basophil % 0.3 %    Immature Granulocyte % 1.8 %   ABORH (Blood Type)    Collection Time: 24  6:34 PM   Result Value Ref Range    ABO BLOOD TYPE O     RH BLOOD TYPE Positive    Antibody Screen    Collection Time: 24  6:34 PM   Result Value Ref Range    Antibody Screen Negative    CBC W/ DIFFERENTIAL    Collection Time: 24  6:14 AM   Result Value Ref Range    WBC 14.7 (H) 4.0 - 11.0 x10(3) uL    RBC 3.72 (L) 3.80 - 5.30 x10(6)uL    HGB 10.6 (L) 12.0 - 16.0 g/dL    HCT 33.6 (L) 35.0 - 48.0 %    MCV 90.3 80.0 - 100.0 fL    MCH 28.5 26.0 - 34.0 pg    MCHC 31.5 31.0 - 37.0 g/dL    RDW-SD 54.7 (H) 35.1 - 46.3 fL    RDW 16.7 (H) 11.0 - 15.0 %    .0 150.0 - 450.0 10(3)uL    Neutrophil Absolute Prelim 11.95 (H) 1.50 - 7.70 x10 (3) uL    Neutrophil Absolute 11.95 (H) 1.50 - 7.70 x10(3) uL    Lymphocyte Absolute 1.35 1.00 - 4.00 x10(3) uL    Monocyte Absolute 1.13 (H) 0.10 - 1.00 x10(3) uL    Eosinophil Absolute 0.09 0.00 - 0.70 x10(3) uL    Basophil Absolute 0.04 0.00 - 0.20 x10(3) uL    Immature Granulocyte Absolute 0.13 0.00 - 1.00 x10(3) uL    Neutrophil % 81.3 %    Lymphocyte % 9.2 %    Monocyte % 7.7 %    Eosinophil % 0.6 %    Basophil % 0.3 %    Immature Granulocyte % 0.9 %       Specimens (From admission, onward)      None            No results found.      Assessment/Plan              28 year oldyo  , s/p spontaneous vaginal, PPD# 1     Patient Active Problem List   Diagnosis    Nausea/vomiting in pregnancy (HCC)    Pregnancy (HCC)    Hyperemesis gravidarum (HCC)    Hypokalemia    Genetic screening    Fetal arrhythmia affecting pregnancy, antepartum (HCC)    Anemia complicating pregnancy, third  trimester (HCC)    Tachycardia with heart rate 121-140 beats per minute   .    ambulate, continue routine postpartum care      Aissatou Roca MD  6/26/2024  9:12 AM

## 2024-06-26 NOTE — ANESTHESIA POSTPROCEDURE EVALUATION
Patient: Don Wu    Procedure Summary       Date: 06/25/24 Room / Location:     Anesthesia Start: 1700 Anesthesia Stop: 1917    Procedure: LABOR ANALGESIA Diagnosis:     Scheduled Providers:  Anesthesiologist: Elijah Keith MD    Anesthesia Type: epidural ASA Status: 2            Anesthesia Type: No value filed.    Vitals Value Taken Time   /52 06/25/24 1915   Temp 98 06/25/24 1929   Pulse 122 06/25/24 1925   Resp 16 06/25/24 1929   SpO2 100 % 06/25/24 1925   Vitals shown include unfiled device data.    [facility] AN Post Evaluation:   Patient Evaluated in PACU  Patient Participation: complete - patient participated  Level of Consciousness: awake  Pain Management: adequate  Airway Patency:patent  Dental exam unchanged from preop  Yes    Cardiovascular Status: acceptable  Respiratory Status: acceptable  Postoperative Hydration acceptable      ELIJAH KEITH MD  6/25/2024 7:29 PM

## 2024-06-26 NOTE — DISCHARGE SUMMARY
[facility]  part of [facility]    Discharge Summary    Don Wu Patient Status:  Inpatient    10/4/1995 MRN [id number]   Location [facility] Attending Charlotte Jimenez MD   Hosp Day # 2       Admit date:  2024    Discharge date: 24    Delivering OB Clinician: Charlotte Jimenez MD     EDC: Estimated Date of Delivery: 7/3/24    Gestational Age: 38w6d    Antepartum complications:   Patient Active Problem List   Diagnosis    Nausea/vomiting in pregnancy (HCC)    Pregnancy (HCC)    Hyperemesis gravidarum (HCC)    Hypokalemia    Genetic screening    Fetal arrhythmia affecting pregnancy, antepartum (HCC)    Anemia complicating pregnancy, third trimester ([facility])    Tachycardia with heart rate 121-140 beats per minute       Date of Delivery: 2024  Time of Delivery: 7:14 PM     Delivery Type: spontaneous vaginal delivery    Baby: Liveborn female (Aeria)  Information for the patient's :  Jose Wu [[id number]]   7 lb 5.8 oz (3.34 kg)   Apgars:  1 minute: 9   5 minutes: 9 10 minutes:       Intrapartum Complications: Non-reassuring Fetal Status - recurrent variable decelerations      Hospital Course: Presented in labor. Received epidural. Recurrent variable decelerations requiring terbutaline and amnioinfusion. Progressed rapidly to complete and pushed over 3 contractions. No complications. Routine delivery and postpartum care  Patient Vitals for the past 36 hrs:   Dilation Effacement (%) Station Presentation Method OB Examiner Station (Labor Curve)   24 1854 9 100 0 Vertex -- MD Tony 5 cm   24 1845 7.5 100 -1 -- -- Mary ESPAÑA 6 cm   24 1555 5 70 -2 Vertex Manual AnnaC RN 7 cm        Discharge Physical Exam:   /73 (BP Location: Right arm)   Pulse 91   Temp 98.5 °F (36.9 °C) (Oral)   Resp 16   LMP 2023 (Exact Date)   SpO2 97%   Breastfeeding Yes   General appearance:  alert, appears stated age, cooperative and  no distress  Abdominal: soft, non-tender, no rebound  Uterus: firm, nontender, below umbilicus  Pelvic: deferred  Extremities: Homans sign is negative, no sign of DVT    Discharged Condition: stable    Disposition: home    Plan:     Follow-up appointment in 6 weeks with Dr. Jimenez

## 2024-06-27 VITALS
RESPIRATION RATE: 16 BRPM | HEART RATE: 91 BPM | TEMPERATURE: 99 F | SYSTOLIC BLOOD PRESSURE: 113 MMHG | DIASTOLIC BLOOD PRESSURE: 73 MMHG | OXYGEN SATURATION: 97 %

## 2024-06-27 NOTE — PROGRESS NOTES
[facility]  part of [facility]    OB/Gyne Post  Progress Note      Don Wu Patient Status:  Inpatient    10/4/1995 MRN [id number]   Location [facility] 3SE Attending Charlotte Jimenez MD   Hosp Day # 2 PCP Keily Child DO       Subjective     Good pain control. Tolerating present diet. Ambulating well. Voiding freely.    She denies fevers, chills, headache, blurry vision, chest pain, SOB, RUQ pain, n/v, dizziness upon ambulation nor leg pain.     Objective   Vital signs in last 24 hours:  Temp:  [98.5 °F (36.9 °C)-98.9 °F (37.2 °C)] 98.5 °F (36.9 °C)  Pulse:  [] 91  Resp:  [16] 16  BP: (111-113)/(73-75) 113/73    Input/Output:  No intake or output data in the 24 hours ending 24 1128    AVSS  Constitutional: comfortable  Abdomen: soft nontender  Uterus: fundus below umbilicus, non tender  Extremities: No calf tenderness, neg homans      Results:   Labs / Path / Radiology:    No results found for this or any previous visit (from the past 24 hour(s)).    Specimens (From admission, onward)      None            No results found.      Assessment/Plan   28 year oldyo  , s/p spontaneous vaginal, PPD# 2     Patient Active Problem List   Diagnosis    Nausea/vomiting in pregnancy (HCC)    Pregnancy (HCC)    Hyperemesis gravidarum (HCC)    Hypokalemia    Genetic screening    Fetal arrhythmia affecting pregnancy, antepartum (HCC)    Anemia complicating pregnancy, third trimester (HCC)    Tachycardia with heart rate 121-140 beats per minute     Dc to home  Instructions reviewed  Follow up in 6 wks for PPV       Avery Davenport DO  2024  11:28 AM

## 2024-06-27 NOTE — DISCHARGE INSTRUCTIONS
Pelvic rest for 6 weeks: nothing in the vagina (tampons, intercourse).   May shower, no bathtubs or swimming.    - Call OB for any concerns:     *Heavy bleeding that saturates one pad per hour for several hours/clots bigger than a golf ball     *Signs of preeclampsia (headache, vision changes, nausea/vomiting)     *Increased pain unrelieved by oral medications     *Anxiety or depression     *Breast concerns     *Fever 100.4 or greater     *Dizziness/fainting     *Calf pain, redness or swelling    Follow up with OB in 6 weeks.       After a Vaginal Birth    After having a baby, your body may be very tired. It can take time to recover from a vaginal delivery. You may stay in the hospital or birth center from 1 to 4 days. In some cases, you may be able to go home the same day.  Right after the delivery  Your temperature and blood pressure will be taken until they are stable. A nurse or other healthcare provider will observe you as you rest. You may have afterbirth pains. These are cramps caused by the uterus shrinking. Sanitary pads are used to soak up the discharge of the uterine lining. To make sure that you aren’t bleeding too much, the pad will be checked. And the firmness of your uterus will be checked. To do this, a nurse will gently push down on your stomach. If you had anesthesia, you’ll be watched closely until you can feel and move your toes. If you have perineal pain (pain between the vagina and anus), an ice pack can help.   care  While still in the hospital or birth center, you’ll learn how to hold and feed your baby. You will also be given instructions on how to care for your baby. This includes bathing and feeding.   Preparing to go home  You may be anxious to go home as soon as possible. Before you and your baby go home, a healthcare provider will check to be sure you are healthy enough to take care of your baby and yourself. You’re ready to go home when:  You can walk to the bathroom and use the  bathroom without help.  You can eat solid food and swallow pills (if needed).  You have no sign of infection or other health problems, including fever.   You have adequate pain control.  Your bleeding isn't excessive.  You are able to care for your  and are emotionally stable.  Before leaving the hospital or birth center, you’ll be given written instructions for home self-care after vaginal delivery. Be sure to follow these instructions carefully. If you have questions or concerns, talk about them now.  If you have stitches  You may have received stitches in the skin near your vagina. The stitches might have closed an episiotomy (an incision that enlarges the opening of the vagina). Or you may have needed stitches to repair torn skin. Either way, your stitches should dissolve within weeks. Until then, you can help reduce discomfort, aid healing, and reduce your risk of infection by keeping the stitches clean. These tips can help:  Gently wipe from front to back after you urinate or have a bowel movement.  After wiping, spray warm water on the area. Or you can have a sitz bath. This means sitting in a tub with a few inches of water in it. Then pat the area dry or use a hairdryer on a cool setting.  Do not use soap or any solution except water on the area.  You can take a shower unless told not to.  Change sanitary pads at least every 2 to 4 hours.  Place cold or heat packs on the area as directed by your healthcare providers or nurses. Keep a thin towel between the pack and your skin.  Sit on firm seats so the stitches pull less.   follow-up  Schedule a  follow-up exam with your healthcare provider for about 6 weeks after delivery. During this exam, your uterus and vaginal area will be checked. Contact your healthcare provider if you think you or your baby are having any problems.  When to call your healthcare provider  Call your health care provider right away if you have:  A fever of  100.4°F (38.0°C) or higher  Bleeding that requires a new sanitary pad after an hour, or large blood clots  Pain in your vagina that gets worse and isn't relieved with medicine  Swelling, discharge, or increased pain from vaginal tear or episiotomy  Burning, pain, red streaks, or lumpy areas in your breasts that may be accompanied by flu-like symptoms  Cracks, blisters, or blood on your nipples  Burning or pain when you urinate  Nausea or vomiting  Dizziness or fainting  Feelings of extreme sadness or anxiety, or a feeling that you don’t want to be with your baby  Abdominal pain that isn’t relieved with medicine  Vaginal discharge that has a bad odor  No bowel movement for 5 days  Painful urination, or inability to control urination  Redness, warmth, or pain in the lower leg  Chest pain   Date Last Reviewed: 8/2/2015  © 2681-4526 [employer]. [address] [place] [address]. All rights reserved. This information is not intended as a substitute for professional medical care. Always follow your healthcare professional's instructions.        Please purchase over the counter motrin and colace as needed for pain and constipation

## 2024-07-01 ENCOUNTER — TELEPHONE (OUTPATIENT)
Dept: OBGYN CLINIC | Facility: CLINIC | Age: 29
End: 2024-07-01

## 2024-07-01 NOTE — TELEPHONE ENCOUNTER
Patient is 6 days post , c/o possible yeast infection. Reports internal vaginal itching for the last 2 days. Patient denies abnormal vaginal discharge and odor. Lochia is a regular period flow. Reports she was using the [employer] Mom postpartum care kit which includes perineal healing foam, perineal cooling pad liners, instant ice maxi pads and disposable postpartum underwear. Informed patient nurse will check with Dr. Maier on call for recommendations.

## 2024-07-01 NOTE — TELEPHONE ENCOUNTER
Left message to call back to notify patient message below was discussed with Dr. Maier on call and v/o received for patient to stop using the [employer] Mom postpartum kit. Likely irritation from products used. Patient to only use the adolfo-bottle with warm water. Patient can take Zyrtec to help with itching.

## 2024-07-01 NOTE — TELEPHONE ENCOUNTER
Patient notified to discontinue products and use adolfo-bottle with warm water only. Advised ok for zyrtec. Patient to let us know if symptoms not improving. Patient verbalized understanding.

## 2024-07-25 ENCOUNTER — TELEPHONE (OUTPATIENT)
Dept: OBGYN UNIT | Facility: HOSPITAL | Age: 29
End: 2024-07-25

## 2024-08-08 ENCOUNTER — TELEPHONE (OUTPATIENT)
Dept: OBGYN CLINIC | Facility: CLINIC | Age: 29
End: 2024-08-08

## 2024-08-08 NOTE — TELEPHONE ENCOUNTER
Patient called and informed she would need to see Dr. Jimenez since she delivered and soonest appointment is what patient is scheduled for. Patient states understanding. RN put patient on wait list if anything opens up sooner.

## 2024-08-08 NOTE — TELEPHONE ENCOUNTER
Patient has 6 week post partum scheduled for 8/13; soonest available. Patient wanted  know if she could be seen sooner possibly with another provider in order for her to return to work. 6 weeks post partum was on 8/6. Please call.

## 2024-08-13 ENCOUNTER — POSTPARTUM (OUTPATIENT)
Dept: OBGYN CLINIC | Facility: CLINIC | Age: 29
End: 2024-08-13

## 2024-08-13 VITALS
DIASTOLIC BLOOD PRESSURE: 70 MMHG | HEART RATE: 83 BPM | BODY MASS INDEX: 30 KG/M2 | SYSTOLIC BLOOD PRESSURE: 106 MMHG | WEIGHT: 159 LBS

## 2024-08-13 NOTE — PROGRESS NOTES
Don Wu is a 28 year old female  here for 6 week post-partum visit.  Patient delivered a  female infant (Giovani) on 2024  via spontaneous vaginal delivery.  Patient desires condoms for contraception.  Patient is bottle feeding.   Patient denies symptoms of depression, [place]  depression scale score of 0 out of 30.     [place]  DEPRESSION SCALE  I have been able to laugh and see the funny side of things: As much as I always could  I have looked forward with enjoyment to things: As much as I ever did  I have been anxious or worried for no good reason: No, not at all  I have felt scared or panicky for no good reason: No, not at all  Things have been getting on top of me: No, I have been coping as well as ever  I have been so unhappy that I have had difficulty sleeping: Not at all  I have felt sad or miserable: No, not at all  I have been so unhappy that I have been crying: No, never  The thought of harming myself has occurred to me: Never  Total: 0    OBSTETRIC HISTORY  OB History    Para Term  AB Living   2 2 2 0 0 2   SAB IAB Ectopic Multiple Live Births   0 0 0 0 2      # Outcome Date GA Lbr Sunny/2nd Weight Sex Type Anes PTL Lv   2 Term 26 38w6d 08:35 / 00:09 7 lb 5.8 oz (3.34 kg) F NORMAL SPONT EPI N MARC      Complications: Late decelerations   1 Term 21 38w1d 10:00 / 00:31 6 lb 14.1 oz (3.12 kg) M NORMAL SPONT EPI N MARC      Obstetric Comments   Sexually active since age 17       GYNE HISTORY        MEDICATIONS:    Current Outpatient Medications:     Ferrous Gluconate-C-Folic Acid (IRON-C OR), Take by mouth., Disp: , Rfl:     prenatal vitamin with DHA 27-0.8-228 MG Oral Cap, Take 1 capsule by mouth daily., Disp: , Rfl:     ALLERGIES:    Allergies   Allergen Reactions    Amoxicillin HIVES and RASH       PHYSICAL EXAM  /70   Pulse 83   Wt 159 lb (72.1 kg)   LMP 2024 (Exact Date)   Breastfeeding No   BMI 30.04 kg/m²   General:     well nourished, well developed woman in no acute distress  Abdomen:    soft, nontender, no masses  External Genitalia:  normal appearance, hair distribution, and no lesions  Vagina:    normal appearance without lesions, no abnormal discharge  Cervix:    normal without tenderness on motion  Uterus:    normal in size, contour, position, mobility, without tenderness  Adnexa:   normal without masses or tenderness  Perineum:   well healed perineum  Anus:    no hemorroids       ASSESSMENT/PLAN    Aeriel was seen today for postpartum care.    Diagnoses and all orders for this visit:    Postpartum care and examination (HCC)        Discussed birthcontrol: Patient has chosen condom. Considering Mirena IUD or Nexplanon.   Patient to return for annual gyne exam in February.

## 2024-08-17 NOTE — DISCHARGE SUMMARY
ONCOLOGY HEMATOLOGY CARE PROGRESS NOTE      SUBJECTIVE:  Feeling better.  No new complaints.    ROS:     Constitutional:  No weight loss, No fever, No chills, No night sweats.   Eyes:  No impairment or change in vision  ENT / Mouth:  No pain, abnormal ulceration, bleeding, nasal drip or change in voice or hearing  Cardiovascular:  No chest pain, palpitations, new edema, or calf discomfort  Respiratory:  No pain, hemoptysis, change to breathing  Gastrointestinal: Persistent diarrhea.  Urinary:  No pain, bleeding or change in continence  Genitalia: No pain, bleeding or discharge  Musculoskeletal:  No redness, pain, edema or weakness  Skin:  No pruritus, rash, change to nodules or lesions  Neurologic:  No discomfort, change in mental status, speech, sensory or motor activity  Psychiatric:  No change in concentration or change to affect or mood  Endocrine:  No hot flashes, increased thirst, or change to urine production  Hematologic: No petechiae, ecchymosis or bleeding  Lymphatic:  No lymphadenopathy or lymphedema  Allergy / Immunologic:  No eczema, hives, frequent or recurrent infections    OBJECTIVE        Physical    VITALS:  Patient Vitals for the past 24 hrs:   BP Temp Temp src Pulse Resp SpO2 Height Weight   08/17/24 0856 135/77 -- -- 65 -- -- -- --   08/17/24 0815 139/83 99 °F (37.2 °C) Oral 75 16 92 % -- --   08/17/24 0630 -- -- -- -- -- -- 1.626 m (5' 4.02\") 80.6 kg (177 lb 9.6 oz)   08/17/24 0406 (!) 143/83 98.2 °F (36.8 °C) Oral 74 16 93 % -- --   08/17/24 0012 (!) 152/87 97.9 °F (36.6 °C) Oral 71 16 95 % -- --   08/16/24 2015 137/84 97.7 °F (36.5 °C) Oral 76 16 -- -- --   08/16/24 1621 128/87 97.8 °F (36.6 °C) Oral 63 -- 95 % -- --   08/16/24 1345 126/82 97.5 °F (36.4 °C) Oral 74 16 95 % -- --       24HR INTAKE/OUTPUT:    Intake/Output Summary (Last 24 hours) at 8/17/2024 1316  Last data filed at 8/17/2024 0634  Gross per 24 hour   Intake 180 ml   Output --   Net 180 ml  [facility]    Discharge Summary    Mallory Iglesias Patient Status:  Inpatient    10/4/1995 MRN [id number]   Location [address] Attending Heather Gray MD   Hosp Day # 1 PCP Charmaine Webster, APRN     Date of Adm function worse.  Diarrhea  History of kidney transplant in early 2013.  On tacrolimus.     Reviewed her labs.  Her neutropenia has resolved after 1 dose of filgrastim.  Will give second dose today.  After that it will be discontinued.  She continues to have electrolyte imbalance.  Her hemoglobin has remained stable.  There are no signs of active bleeding.  Her thrombocytopenia is mild.  Discussed with patient and her family about her condition and options.  Follow-up as outpatient.      ONCOLOGIC DISPOSITION:      Tenzin Swenson MD  Please contact through [facility] feeling better.

## 2024-08-19 PROBLEM — R00.0 TACHYCARDIA WITH HEART RATE 121-140 BEATS PER MINUTE: Status: RESOLVED | Noted: 2024-04-22 | Resolved: 2024-08-19

## 2024-08-19 PROBLEM — E87.6 HYPOKALEMIA: Status: RESOLVED | Noted: 2023-12-14 | Resolved: 2024-08-19

## 2024-08-19 PROBLEM — O21.0 HYPEREMESIS GRAVIDARUM (HCC): Status: RESOLVED | Noted: 2023-12-14 | Resolved: 2024-08-19

## 2024-08-19 PROBLEM — O99.013 ANEMIA COMPLICATING PREGNANCY, THIRD TRIMESTER (HCC): Status: RESOLVED | Noted: 2024-04-04 | Resolved: 2024-08-19

## 2024-08-19 PROBLEM — Z34.90 PREGNANCY (HCC): Status: RESOLVED | Noted: 2021-11-08 | Resolved: 2024-08-19

## 2024-08-19 PROBLEM — Z13.79 GENETIC SCREENING: Status: RESOLVED | Noted: 2024-01-03 | Resolved: 2024-08-19

## 2024-08-19 PROBLEM — O36.8390 FETAL ARRHYTHMIA AFFECTING PREGNANCY, ANTEPARTUM (HCC): Status: RESOLVED | Noted: 2024-02-20 | Resolved: 2024-08-19

## 2024-08-19 PROBLEM — O21.9 NAUSEA/VOMITING IN PREGNANCY (HCC): Status: RESOLVED | Noted: 2021-06-02 | Resolved: 2024-08-19

## 2024-12-11 ENCOUNTER — TELEPHONE (OUTPATIENT)
Dept: OBGYN CLINIC | Facility: CLINIC | Age: 29
End: 2024-12-11

## 2024-12-11 NOTE — TELEPHONE ENCOUNTER
Don calling to scheduled Nexplanon. Discussed at postpartum visit.   She is having monthly cycles.   Advised to call Day 1 of full flow to scheduled. Patient verbalized understanding.

## 2024-12-16 ENCOUNTER — TELEPHONE (OUTPATIENT)
Dept: OBGYN CLINIC | Facility: CLINIC | Age: 29
End: 2024-12-16

## 2024-12-16 NOTE — TELEPHONE ENCOUNTER
Don calling to schedule Nexplanon insert.   First day of full flow 12/15. Scheduled tomorrow with Dr. Jimenez.   Recommendations for motrin 600 mg 1 hr prior. Patient verbalized understanding.

## 2024-12-17 ENCOUNTER — OFFICE VISIT (OUTPATIENT)
Dept: OBGYN CLINIC | Facility: CLINIC | Age: 29
End: 2024-12-17

## 2024-12-17 VITALS
WEIGHT: 163.19 LBS | DIASTOLIC BLOOD PRESSURE: 70 MMHG | HEART RATE: 80 BPM | BODY MASS INDEX: 31 KG/M2 | SYSTOLIC BLOOD PRESSURE: 103 MMHG

## 2024-12-17 DIAGNOSIS — Z32.00 PREGNANCY EXAMINATION OR TEST, PREGNANCY UNCONFIRMED: Primary | ICD-10-CM

## 2024-12-17 DIAGNOSIS — Z30.017 NEXPLANON INSERTION: ICD-10-CM

## 2024-12-17 LAB
CONTROL LINE PRESENT WITH A CLEAR BACKGROUND (YES/NO): YES YES/NO
KIT LOT #: NORMAL NUMERIC

## 2024-12-17 PROCEDURE — 81025 URINE PREGNANCY TEST: CPT | Performed by: OBSTETRICS & GYNECOLOGY

## 2024-12-17 PROCEDURE — 11981 INSERTION DRUG DLVR IMPLANT: CPT | Performed by: OBSTETRICS & GYNECOLOGY

## 2024-12-17 NOTE — PROCEDURES
Nexplanon Insertion/Removal    Pregnancy Results: negative from urine test   Birth control method(s) used:  ; date last used:    Consent was obtained from the patient.      Insertion:  The patient was positioned with her right arm flexed.    Measurement was taken from her epicondyle approximately 8 cm and marked 3 cm apart from the orginal sunshine.  1% lidocaine with epinephrine  was used to inject the planned insertion site.  Device opened, scot confirmed within device.  Lateral traction of skin performed while Nexplanon inserted.  The Nexplanon was placed 8 cm from the epicondyle without difficulty.    The ridged portion of the applicator was seen once the device was withdrawn.      Visit Plan:  Steri-Strips were applied to the skin incision.  An Ace bandage was wrapped around the injected arm.  Both Physician and pt confirmed device by tactile feel.  Patient was instructed to remove Ace bandage in 24 hours.  Patient was instructed to remove Steri-Strips in 7 days.  All of the patient's questions were addressed.

## 2025-07-02 ENCOUNTER — OFFICE VISIT (OUTPATIENT)
Dept: OBGYN CLINIC | Facility: CLINIC | Age: 30
End: 2025-07-02
Payer: MEDICAID

## 2025-07-02 VITALS
HEIGHT: 61 IN | SYSTOLIC BLOOD PRESSURE: 102 MMHG | WEIGHT: 158.13 LBS | BODY MASS INDEX: 29.86 KG/M2 | DIASTOLIC BLOOD PRESSURE: 64 MMHG | HEART RATE: 97 BPM

## 2025-07-02 DIAGNOSIS — Z97.5 BREAKTHROUGH BLEEDING ON NEXPLANON: Primary | ICD-10-CM

## 2025-07-02 DIAGNOSIS — N92.1 BREAKTHROUGH BLEEDING ON NEXPLANON: Primary | ICD-10-CM

## 2025-07-02 PROCEDURE — 99213 OFFICE O/P EST LOW 20 MIN: CPT | Performed by: NURSE PRACTITIONER

## 2025-07-02 RX ORDER — NORETHINDRONE ACETATE AND ETHINYL ESTRADIOL 1MG-20(21)
1 KIT ORAL DAILY
Qty: 28 TABLET | Refills: 0 | Status: SHIPPED | OUTPATIENT
Start: 2025-07-02

## 2025-07-02 NOTE — PROGRESS NOTES
Subjective:  29 year old    Chief Complaint   Patient presents with    Menstrual Problem     Patient has Nexplanon periods are irregular, wants to know if also taking birth control pills is an option     Pt here today, with her 2 children, with complaints of irregular menses  She notes that menses has been irregular since placement of nexplanon      Review of Systems:  Pertinent items are noted in the HPI.    Objective:  /64   Pulse 97   Ht 61\"   Wt 158 lb 2 oz (71.7 kg)   LMP 2025 (Exact Date)       Physical Examination:  General appearance: Well dressed, well nourished in no apparent distress  Neurologic/Psychiatric: Alert and oriented to person, place and time, mood normal, affect appropriate      Assessment/Plan:      Diagnoses and all orders for this visit:    Breakthrough bleeding on Nexplanon  - discussed irregular bleeding pattern with nexplanon  - discussed treatment, pt desires OCP  - no contraindication      - Norethin Ace-Eth Estrad-FE 1-20 MG-MCG Oral Tab; Take 1 tablet by mouth daily.  - pt also desire removal of Nexplanon r/b/a discussed  - she is considering continuing OCP vs progestin IUD  - will need longer procedure appt to have Nexplanon removed and IUD placed  - discussed that with procedure will not be able to bring minor children  - will need negative UPT        Return for Procedure - IUD removal.

## 2025-08-01 ENCOUNTER — OFFICE VISIT (OUTPATIENT)
Dept: OBGYN CLINIC | Facility: CLINIC | Age: 30
End: 2025-08-01

## 2025-08-01 VITALS
HEART RATE: 94 BPM | SYSTOLIC BLOOD PRESSURE: 110 MMHG | BODY MASS INDEX: 30.04 KG/M2 | HEIGHT: 61 IN | DIASTOLIC BLOOD PRESSURE: 72 MMHG | WEIGHT: 159.13 LBS

## 2025-08-01 DIAGNOSIS — Z30.46 ENCOUNTER FOR REMOVAL OF SUBDERMAL CONTRACEPTIVE IMPLANT: Primary | ICD-10-CM

## 2025-08-01 DIAGNOSIS — Z01.812 PRE-PROCEDURAL LABORATORY EXAMINATION: ICD-10-CM

## 2025-08-01 DIAGNOSIS — Z30.430 ENCOUNTER FOR INSERTION OF INTRAUTERINE CONTRACEPTIVE DEVICE (IUD): ICD-10-CM

## 2025-08-01 LAB
CONTROL LINE PRESENT WITH A CLEAR BACKGROUND (YES/NO): YES YES/NO
KIT LOT #: NORMAL NUMERIC
PREGNANCY TEST, URINE: NEGATIVE

## (undated) NOTE — LETTER
VACCINE ADMINISTRATION RECORD  PARENT / GUARDIAN APPROVAL  Date: 2024  Vaccine administered to: Don Wu     : 10/4/1995    MRN: [id number]    A copy of the appropriate Centers for Disease Control and Prevention Vaccine Information statement has been provided. I have read or have had explained the information about the diseases and the vaccines listed below. There was an opportunity to ask questions and any questions were answered satisfactorily. I believe that I understand the benefits and risks of the vaccine cited and ask that the vaccine(s) listed below be given to me or to the person named above (for whom I am authorized to make this request).    VACCINES ADMINISTERED:  Tdap    I have read and hereby agree to be bound by the terms of this agreement as stated above. My signature is valid until revoked by me in writing.  This document is signed by PT, relationship: Self on 2024.:                                                                                                                                         Parent / Guardian Signature                                                Date    Jovita Palacio served as a witness to authentication that the identity of the person signing electronically is in fact the person represented as signing.    This document was generated by Jovita Palacio on 2024.

## (undated) NOTE — LETTER
[facility]  Administration of Anesthesia  1. Abner Flores, or _________________________________ acting on her behalf, (Patient) (Dependent/Representative) request to receive anesthesia for my pending procedure/operation/treatment. bleeding, seizure, cardiac arrest and death. 7. AWARENESS: I understand that it is possible (but unlikely) to have explicit memory of events from the operating room while under general anesthesia.   8. ELECTROCONVULSIVE THERAPY PATIENTS: This consent serve below affirms that prior to the time of the procedure, I have explained to the patient and/or his/her guardian, the risks and benefits of undergoing anesthesia, as well as any reasonable alternatives.     ___________________________________________________

## (undated) NOTE — LETTER
AUTHORIZATION FOR SURGICAL OPERATION OR OTHER PROCEDURE    1. I hereby authorize Dr. PIERRE, and [facility] staff assigned to my case to perform the following operation and/or procedure at the [facility] site:    _______________________________________________________________________________________________    NEXPLANON INSERTION  _______________________________________________________________________________________________    2.  My physician has explained the nature and purpose of the operation or other procedure, possible alternative methods of treatment, the risks involved, and the possibility of complication to me.  I acknowledge that no guarantee has been made as to the result that may be obtained.  3.  I recognize that, during the course of this operation, or other procedure, unforseen conditions may necessitate additional or different procedure than those listed above.  I, therefore, further authorize and request that the above named physician, his/her physician assistants or designees perform such procedures as are, in his/her professional opinion, necessary and desirable.  4.  Any tissue or organs removed in the operation or other procedure may be disposed of by and at the discretion of the [facility] and [facility].  5.  I understand that in the event of a medical emergency, I will be transported by local paramedics to [facility] or other hospital emergency department.  6.  I certify that I have read and fully understand the above consent to operation and/or other procedure.    7.  I acknowledge that my physician has explained sedation/analgesia administration to me including the risks and benefits.  I consent to the administration of sedation/analgesia as may be necessary or desirable in the judgement of my physician.    Witness signature: ___________________________________________________ Date:  ______/______/_____                     Time:  ________ A.M.  P.M.       Patient Name:  ______________________________________________________  (please print)      Patient signature:  ___________________________________________________             Relationship to Patient:           []  Parent    Responsible person                          []  Spouse  In case of minor or                    [] Other  _____________   Incompetent name:  __________________________________________________                               (please print)      _____________      Responsible person  In case of minor or  Incompetent signature:  _______________________________________________    Statement of Physician  My signature below affirms that prior to the time of the procedure, I have explained to the patient and/or his/her guardian, the risks and benefits involved in the proposed treatment and any reasonable alternative to the proposed treatment.  I have also explained the risks and benefits involved in the refusal of the proposed treatment and have answered the patient's questions.                        Date:  ______/______/_______  Provider                      Signature:  __________________________________________________________       Time:  ___________ A.M    P.M.

## (undated) NOTE — LETTER
[facility]  Administration of Anesthesia  1. Richy Curprieto, or _________________________________ acting on her behalf, (Patient) (Dependent/Representative) request to receive anesthesia for my pending procedure/operation/treatment. bleeding, seizure, cardiac arrest and death. 7. AWARENESS: I understand that it is possible (but unlikely) to have explicit memory of events from the operating room while under general anesthesia.   8. ELECTROCONVULSIVE THERAPY PATIENTS: This consent serve below affirms that prior to the time of the procedure, I have explained to the patient and/or his/her guardian, the risks and benefits of undergoing anesthesia, as well as any reasonable alternatives.     ___________________________________________________

## (undated) NOTE — LETTER
Patient Name: Vikram Pascual  : 10/4/1995  MRN: [id number]  Patient Address: [address] [place] [address]      Coronavirus Disease 2019 (COVID-19)     [facility] is committed to the safety and well-being of our patients, members, carefully. If your symptoms get worse, call your healthcare provider immediately. 3. Get rest and stay hydrated.    4. If you have a medical appointment, call the healthcare provider ahead of time and tell them that you have or may have COVID-19.  5. For m of fever-reducing medications; and  · Improvement in respiratory symptoms (e.g., cough, shortness of breath); and  · At least 10 days have passed since symptoms first appeared OR if asymptomatic patient or date of symptom onset is unclear then use 10 days donors must:    · Have had a confirmed diagnosis of COVID-19  · Be symptom-free for at least 14 days*    *Some people will be required to have a repeat COVID-19 test in order to be eligible to donate.  If you’re instructed by Cristhian that a repeat test is r random. Researchers are trying to identify similarities between people with a Post-COVID condition to better understand if there are risk factors. How do I prevent a Post-COVID condition?   The best way to prevent the long-term symptoms of COVID-19 is

## (undated) NOTE — LETTER
9/1/2017              Missy Michael Bravo[address] [address]         Dear Bela Olivo,      It was a pleasure to see you at our [address]  office.   Your lab tests were normal.  There is no nee

## (undated) NOTE — LETTER
[facility]  Administration of Anesthesia  I, Don Wu agree to be cared for by a physician anesthesiologist alone and/or with a nurse anesthetist, who is specially trained to monitor me and give me medicine to put me to sleep or keep me comfortable during my procedure    I understand that my anesthesiologist and/or anesthetist is not an employee or agent of [facility] or [employer]. He or she works for [facility] [employer]    As the patient asking for anesthesia services, I agree to:  Allow the anesthesiologist (anesthesia doctor) to give me medicine and do additional procedures as necessary. Some examples are: Starting or using an “IV” to give me medicine, fluids or blood during my procedure, and having a breathing tube placed to help me breathe when I’m asleep (intubation). In the event that my heart stops working properly, I understand that my anesthesiologist will make every effort to sustain my life, unless otherwise directed by [facility] Do Not Resuscitate documents.  Tell my anesthesia doctor before my procedure:  If I am pregnant.  The last time that I ate or drank.  iii. All of the medicines I take (including prescriptions, herbal supplements, and pills I can buy without a prescription (including street drugs/illegal medications). Failure to inform my anesthesiologist about these medicines may increase my risk of anesthetic complications.  iv.If I am allergic to anything or have had a reaction to anesthesia before.  I understand how the anesthesia medicine will help me (benefits).  I understand that with any type of anesthesia medicine there are risks:  The most common risks are: nausea, vomiting, sore throat, muscle soreness, damage to my eyes, mouth, or teeth (from breathing tube placement).  Rare risks include: remembering what happened during my procedure, allergic reactions to medications, injury to my airway, heart, lungs, vision, nerves, or  muscles and in extremely rare instances death.  My doctor has explained to me other choices available to me for my care (alternatives).  Pregnant Patients (“epidural”):  I understand that the risks of having an epidural (medicine given into my back to help control pain during labor), include itching, low blood pressure, difficulty urinating, headache or slowing of the baby’s heart. Very rare risks include infection, bleeding, seizure, irregular heart rhythms and nerve injury.  Regional Anesthesia (“spinal”, “epidural”, & “nerve blocks”):  I understand that rare but potential complications include headache, bleeding, infection, seizure, irregular heart rhythms, and nerve injury.    _____________________________________________________________________________  Patient (or Representative) Signature/Relationship to Patient  Date   Time    _____________________________________________________________________________   Name (if used)    Language/Organization   Time    _____________________________________________________________________________  Nurse Anesthetist Signature     Date   Time  _____________________________________________________________________________  Anesthesiologist Signature     Date   Time  I have discussed the procedure and information above with the patient (or patient’s representative) and answered their questions. The patient or their representative has agreed to have anesthesia services.    _____________________________________________________________________________  Witness        Date   Time  I have verified that the signature is that of the patient or patient’s representative, and that it was signed before the procedure  Patient Name: Don Wu     : 10/4/1995                 Printed: 2024 at 3:55 PM    Medical Record #: [id number]                                            Page 1 of 1  ----------ANESTHESIA CONSENT----------

## (undated) NOTE — LETTER
[facility]  Administration of Anesthesia  1. Alexis Amin, or _________________________________ acting on her behalf, (Patient) (Dependent/Representative) request to receive anesthesia for my pending procedure/operation/treatment. bleeding, seizure, cardiac arrest and death. 7. AWARENESS: I understand that it is possible (but unlikely) to have explicit memory of events from the operating room while under general anesthesia.   8. ELECTROCONVULSIVE THERAPY PATIENTS: This consent serve below affirms that prior to the time of the procedure, I have explained to the patient and/or his/her guardian, the risks and benefits of undergoing anesthesia, as well as any reasonable alternatives.     ___________________________________________________